# Patient Record
Sex: FEMALE | Race: WHITE | NOT HISPANIC OR LATINO | Employment: FULL TIME | URBAN - METROPOLITAN AREA
[De-identification: names, ages, dates, MRNs, and addresses within clinical notes are randomized per-mention and may not be internally consistent; named-entity substitution may affect disease eponyms.]

---

## 2017-02-01 ENCOUNTER — ALLSCRIPTS OFFICE VISIT (OUTPATIENT)
Dept: OTHER | Facility: OTHER | Age: 37
End: 2017-02-01

## 2017-02-01 ENCOUNTER — TRANSCRIBE ORDERS (OUTPATIENT)
Dept: ADMINISTRATIVE | Facility: HOSPITAL | Age: 37
End: 2017-02-01

## 2017-02-01 DIAGNOSIS — N64.52 NIPPLE DISCHARGE: Primary | ICD-10-CM

## 2017-04-20 ENCOUNTER — HOSPITAL ENCOUNTER (OUTPATIENT)
Dept: ULTRASOUND IMAGING | Facility: CLINIC | Age: 37
Discharge: HOME | End: 2017-04-20
Payer: COMMERCIAL

## 2017-04-20 ENCOUNTER — HOSPITAL ENCOUNTER (OUTPATIENT)
Dept: MAMMOGRAPHY | Facility: CLINIC | Age: 37
Discharge: HOME | End: 2017-04-20
Payer: COMMERCIAL

## 2017-04-20 DIAGNOSIS — N64.52 NIPPLE DISCHARGE: ICD-10-CM

## 2017-04-20 PROCEDURE — G0204 DX MAMMO INCL CAD BI: HCPCS

## 2017-04-20 PROCEDURE — 76642 ULTRASOUND BREAST LIMITED: CPT

## 2017-05-01 ENCOUNTER — ALLSCRIPTS OFFICE VISIT (OUTPATIENT)
Dept: OTHER | Facility: OTHER | Age: 37
End: 2017-05-01

## 2017-05-08 ENCOUNTER — ALLSCRIPTS OFFICE VISIT (OUTPATIENT)
Dept: OTHER | Facility: OTHER | Age: 37
End: 2017-05-08

## 2017-05-08 DIAGNOSIS — F41.9 ANXIETY DISORDER: ICD-10-CM

## 2017-05-08 DIAGNOSIS — H93.A2 PULSATILE TINNITUS OF LEFT EAR: ICD-10-CM

## 2017-05-08 DIAGNOSIS — H93.A1 PULSATILE TINNITUS OF RIGHT EAR: ICD-10-CM

## 2017-05-08 DIAGNOSIS — M87.00: ICD-10-CM

## 2017-05-08 DIAGNOSIS — M35.9 SYSTEMIC INVOLVEMENT OF CONNECTIVE TISSUE (HCC): ICD-10-CM

## 2017-05-08 DIAGNOSIS — G47.00 INSOMNIA: ICD-10-CM

## 2017-05-08 DIAGNOSIS — E04.1 NONTOXIC SINGLE THYROID NODULE: ICD-10-CM

## 2017-05-09 ENCOUNTER — TRANSCRIBE ORDERS (OUTPATIENT)
Dept: ADMINISTRATIVE | Facility: HOSPITAL | Age: 37
End: 2017-05-09

## 2017-05-09 DIAGNOSIS — H93.A2 PULSATILE TINNITUS OF LEFT EAR: ICD-10-CM

## 2017-05-09 DIAGNOSIS — H93.A1 PULSATILE TINNITUS OF RIGHT EAR: Primary | ICD-10-CM

## 2017-05-15 ENCOUNTER — HOSPITAL ENCOUNTER (OUTPATIENT)
Dept: ULTRASOUND IMAGING | Facility: HOSPITAL | Age: 37
Discharge: HOME | End: 2017-05-15
Payer: COMMERCIAL

## 2017-05-15 DIAGNOSIS — M87.00: ICD-10-CM

## 2017-05-15 DIAGNOSIS — E04.1 NONTOXIC SINGLE THYROID NODULE: ICD-10-CM

## 2017-05-15 DIAGNOSIS — F41.9 ANXIETY DISORDER: ICD-10-CM

## 2017-05-15 DIAGNOSIS — G47.00 INSOMNIA: ICD-10-CM

## 2017-05-15 DIAGNOSIS — H93.A2 PULSATILE TINNITUS OF LEFT EAR: ICD-10-CM

## 2017-05-15 DIAGNOSIS — H93.A1 PULSATILE TINNITUS OF RIGHT EAR: ICD-10-CM

## 2017-05-15 DIAGNOSIS — M35.9 SYSTEMIC INVOLVEMENT OF CONNECTIVE TISSUE (HCC): ICD-10-CM

## 2017-05-15 PROCEDURE — 76536 US EXAM OF HEAD AND NECK: CPT

## 2017-05-15 PROCEDURE — 93880 EXTRACRANIAL BILAT STUDY: CPT

## 2017-05-18 ENCOUNTER — HOSPITAL ENCOUNTER (OUTPATIENT)
Dept: MRI IMAGING | Facility: HOSPITAL | Age: 37
Discharge: HOME | End: 2017-05-18
Payer: COMMERCIAL

## 2017-05-18 DIAGNOSIS — H93.A1 PULSATILE TINNITUS OF RIGHT EAR: ICD-10-CM

## 2017-05-18 DIAGNOSIS — H93.A2 PULSATILE TINNITUS OF LEFT EAR: ICD-10-CM

## 2017-05-18 PROCEDURE — 70551 MRI BRAIN STEM W/O DYE: CPT

## 2017-06-05 ENCOUNTER — ALLSCRIPTS OFFICE VISIT (OUTPATIENT)
Dept: OTHER | Facility: OTHER | Age: 37
End: 2017-06-05

## 2017-08-02 ENCOUNTER — ALLSCRIPTS OFFICE VISIT (OUTPATIENT)
Dept: OTHER | Facility: OTHER | Age: 37
End: 2017-08-02

## 2017-10-03 ENCOUNTER — ALLSCRIPTS OFFICE VISIT (OUTPATIENT)
Dept: OTHER | Facility: OTHER | Age: 37
End: 2017-10-03

## 2017-10-04 NOTE — PROGRESS NOTES
Assessment  1  Breast abscess (611 0) (N61 1)    Plan  Breast abscess    · Cephalexin 500 MG Oral Capsule; TAKE 1 CAPSULE 3 TIMES DAILY   · Follow Up if Not Better Evaluation and Treatment  Follow-up  Status: Complete  Done:  [id number] 01:28PM    Chief Complaint  patient presents today stating her R breast hurts since Thursday  Patient states she noticed lump under her skin tissue that opened up friday and oozed pus  Patient states she applied topical antibiotic ointment and has a low fever Thursday and Friday  History of Present Illness  HPI: Patient here with right breast pain and possible abscess which open over the weekend  Patient had some redness and then the abscess opened and drained  Patient now some scabbing and still some redness  No discharge at this point  No fever at this point  Patient did see Dr Rosario Montgomery on multiple occasions and had to her mammograms and 2 breast ultrasounds which were negative  Review of Systems    Constitutional: No fever, no chills, feels well, no tiredness, no recent weight gain or loss  ENT: no ear ache, no loss of hearing, no nosebleeds or nasal discharge, no sore throat or hoarseness  Cardiovascular: no complaints of slow or fast heart rate, no chest pain, no palpitations, no leg claudication or lower extremity edema  Respiratory: no complaints of shortness of breath, no wheezing, no dyspnea on exertion, no orthopnea or PND  Breasts: no complaints of breast pain, breast lump or nipple discharge,-as noted in HPI,-no breast pain,-no breast swelling,-no reddening of the breast,-no breast lump,-no breast itching-and-no nipple discharge  Gastrointestinal: no complaints of abdominal pain, no constipation, no nausea or diarrhea, no vomiting, no bloody stools  Genitourinary: no complaints of dysuria, no incontinence, no pelvic pain, no dysmenorrhea, no vaginal discharge or abnormal vaginal bleeding     Musculoskeletal: no complaints of arthralgia, no myalgia, no joint swelling or stiffness, no limb pain or swelling  Integumentary: as noted in HPI  Neurological: as noted in HPI  Active Problems  1  Anxiety disorder (300 00) (F41 9)   2  Autoimmune disease (279 49) (M35 9)   3  Avascular bone necrosis (733 40) (M87 00)   4  Crohn's disease (555 9) (K50 90)   5  Endometriosis (617 9) (N80 9)   6  Insomnia (780 52) (G47 00)   7  Left hip pain (719 45) (M25 552)   8  Low back pain (724 2) (M54 5)   9  Malignant melanoma of back (172 5) (C43 59)   10  Myalgia And Myositis (729 1)   11  Need for immunization against influenza (V04 81) (Z23)   12  Nipple discharge (611 79) (N64 52)   13  Nontoxic single thyroid nodule (241 0) (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)   14  Pulsatile tinnitus of left ear (388 30) (H93 A2)   15  Pulsatile tinnitus of right ear (388 30) (H93 A1)   16  Symptomatic varicose veins of left lower extremity (454 8) (I83 892)   17  URTI (acute upper respiratory infection) (465 9) (J06 9)   18  Varicose veins (454 9) (I83 90)    Past Medical History  1  History of Arthritis (V13 4)   2  History of Avascular Necrosis (733 40)   3  History of Black tarry stools (578 1) (K92 1)   4  History of Breast pain, right (611 71) (N64 4)   5  History of Endometriosis (617 9) (N80 9)   6  History of abdominal pain (V13 89) (Z87 898)   7  History of anemia (V12 3) (Z86 2)   8  History of Crohn's disease (V12 70) (Z87 19)   9  History of diarrhea (V12 79) (Z87 898)   10  History of dizziness (V13 89) (Z87 898)   11  History of gastroesophageal reflux (GERD) (V12 79) (Z87 19)   12  History of gestational diabetes mellitus (GDM) (V12 21) (Z86 32)   13  History of malignant melanoma (V10 82) (Z85 820)   14  History of nausea (V12 79) (Z87 898)   15  History of pregnancy (V13 29)   16  History of thyroid disease (V12 29) (Z86 39)   17  History of vomiting (V13 89) (Z87 898)   18  History of Menarche (V21 8)   19  History of Orbital myositis, 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) (H05 129)   20   History of Uses birth control (V25 9) (Z30 9)  Active Problems And Past Medical History Reviewed: The active problems and past medical history were reviewed and updated today  Family History  Mother    1  Family history of Diabetes Mellitus (V18 0)   2  Denied: Family history of malignant neoplasm  Father    3  Denied: Family history of malignant neoplasm  Family History    4  Family history of Anxiety (Symptom)   5  Family history of Coronary Artery Disease (V17 49)   6  Family history of Depression   7  Family history of Stroke Syndrome (V17 1)    Social History   · Denied: History of Being A Social Drinker   · Denied: History of Drug Use   · Never A Smoker    Surgical History  1  History of  Section   2  History of Dental Surgery   3  History of Dilation And Curettage   4  History of Excision Of Lesion Trunk   5  History of Hernia Repair   6  History of Knee Surgery   7  History of Laparoscopy (Diagnostic)    Current Meds   1  Calcium TABS; Therapy: (Recorded:53Nuh5801) to Recorded   2  ClonazePAM 1 MG Oral Tablet; TAKE 1 TABLET AT BEDTIME; Therapy: 69NDO7528 to (Evaluate:2017); Last KS:[id number] Ordered   3  Multivitamins TABS; TAKE 1 TABLET DAILY; Therapy: 46AIY1919 to Recorded   4  Tri-Sprintec 0 18/0 215/0 25 MG-35 MCG Oral Tablet; Therapy: 54BSH3827 to Recorded   5  Vitamin C CAPS; Therapy: (Recorded:66Ttu9340) to Recorded   6  Vitamin D3 5000 UNIT Oral Tablet; TAKE 1 TABLET EVERY OTHER DAY; Therapy: 91QUC9129 to Recorded    The medication list was reviewed and updated today  Allergies  1  No Known Drug Allergies  2  Adhesive Tape   3  Other    Vitals   Recorded: 01ZYK8822 01:13PM   Temperature 98 7 F, Tympanic   Systolic 546, LUE, Sitting   Diastolic 78, LUE, Sitting   Height 5 ft 6 in   Weight 167 lb    BMI Calculated 26 95   BSA Calculated 1 85     Physical Exam    Constitutional   General appearance: No acute distress, well appearing and well nourished      Eyes   Conjunctiva and lids: No swelling, erythema or discharge  Pupils and irises: Equal, round and reactive to light  Ears, Nose, Mouth, and Throat   External inspection of ears and nose: Normal     Otoscopic examination: Tympanic membranes translucent with normal light reflex  Canals patent without erythema  Nasal mucosa, septum, and turbinates: Normal without edema or erythema  Oropharynx: Normal with no erythema, edema, exudate or lesions  Pulmonary   Respiratory effort: No increased work of breathing or signs of respiratory distress  Auscultation of lungs: Clear to auscultation  Cardiovascular   Palpation of heart: Normal PMI, no thrills  Auscultation of heart: Normal rate and rhythm, normal S1 and S2, without murmurs  Examination of extremities for edema and/or varicosities: Normal     Carotid pulses: Normal     Abdomen   Abdomen: Non-tender, no masses  Liver and spleen: No hepatomegaly or splenomegaly  Lymphatic   Palpation of lymph nodes in neck: Abnormal  -Thyroid nodules bilaterally  Musculoskeletal   Gait and station: Normal     Digits and nails: Normal without clubbing or cyanosis  Inspection/palpation of joints, bones, and muscles: Normal     Skin   Skin and subcutaneous tissue: Normal without rashes or lesions  Neurologic   Cranial nerves: Cranial nerves 2-12 intact  Reflexes: 2+ and symmetric  Sensation: No sensory loss  Psychiatric   Orientation to person, place, and time: Normal     Mood and affect: Normal          Results/Data  PHQ-2 Adult Depression Screening [id number] 01:18PM UserDaniel     Test Name Result Flag Reference   PHQ-2 Adult Depression Score 0     Over the last two weeks, how often have you been bothered by any of the following problems?   Little interest or pleasure in doing things: Not at all - 0  Feeling down, depressed, or hopeless: Not at all - 0   PHQ-2 Adult Depression Screening Negative         Future Appointments    Date/Time Provider Specialty Site 11/08/2017 08:30 AM Santosh Fox DO [facility]  [place]     Signatures   Electronically signed by : Swapna Mena DO; Oct  3 2017  1:28PM EST                       (Author)

## 2017-11-02 ENCOUNTER — HOSPITAL ENCOUNTER (OUTPATIENT)
Dept: NON INVASIVE DIAGNOSTICS | Facility: CLINIC | Age: 37
Discharge: HOME | End: 2017-11-02
Payer: COMMERCIAL

## 2017-11-02 DIAGNOSIS — I83.892 VARICOSE VEINS OF LEFT LOWER EXTREMITY WITH OTHER COMPLICATIONS (CODE): ICD-10-CM

## 2017-11-02 DIAGNOSIS — I83.892 VARICOSE VEINS OF LEFT LOWER EXTREMITIES WITH OTHER COMPLICATIONS: ICD-10-CM

## 2017-11-02 DIAGNOSIS — I83.90 ASYMPTOMATIC VARICOSE VEINS OF LOWER EXTREMITY: ICD-10-CM

## 2017-11-02 DIAGNOSIS — I83.892 VARICOSE VEINS OF LEFT LOWER EXTREMITY WITH OTHER COMPLICATIONS: ICD-10-CM

## 2017-11-02 PROCEDURE — 93970 EXTREMITY STUDY: CPT

## 2017-11-08 ENCOUNTER — ALLSCRIPTS OFFICE VISIT (OUTPATIENT)
Dept: OTHER | Facility: OTHER | Age: 37
End: 2017-11-08

## 2017-11-10 NOTE — PROGRESS NOTES
Assessment    1  Autoimmune disease (279 49) (M35 9)  2  Symptomatic varicose veins of left lower extremity (454 8) (I83 892)  3  Varicose veins (454 9) (I83 90)  4  Avascular bone necrosis (733 40) (M87 00)  5  Crohn's disease (555 9) (K50 90)    Plan  Symptomatic varicose veins of left lower extremity, Varicose veins    · (1) BASIC METABOLIC PROFILE; Status:Active; Requested OMQ:[id number];   Perform:[facility]; XBV:[id number]; Ordered; For:Symptomatic varicose veins of left lower extremity, Varicose veins; Ordered By:Batsheva Cochran;   · (1) CBC/PLT/DIFF; Status:Active; Requested YBL:[id number];   Perform:[facility]; XBB:[id number]; Ordered; For:Symptomatic varicose veins of left lower extremity, Varicose veins; Ordered By:Batsheva Cochran;   · (1) PT WITH INR; Status:Active; Requested BUN:[id number];   Perform:[facility]; XIK:[id number]; Ordered; For:Symptomatic varicose veins of left lower extremity, Varicose veins; Ordered By:Batsheva Cochran;   · (1) TYPE & SCREEN; Status:Active; Requested for:08Nov2017;   Perform:[facility]; Order Comments:preop EVLT LLE; GTQ:[id number]; Ordered; For:Symptomatic varicose veins of left lower extremity, Varicose veins; Ordered By:Batsheva Cochran;  Pt currently receiving a biologic? : NoIs the patient pregnant or have they been in the last 90 days? : NoHas the patient been transfused in the last 3 months? : NoMedical or PreOp : PreOp   · Follow-up After Procedure Evaluation and Treatment  Follow-up  Status: Hold For -Scheduling  Requested for: [id number]  Ordered; For: Symptomatic varicose veins of left lower extremity, Varicose veins;  Ordered By: Leopoldo Truong  Performed:   Due: 53TNE0886; Last Updated By: Tarah Ferrell; 11/8/2017 9:24:41 AM   · Schedule Surgery Treatment  Procedure  Status: Hold For - Scheduling  Requested BXG:[id number]  Ordered; For: Symptomatic varicose veins of left lower extremity, Varicose veins;  Ordered By: Thong Paez  Performed:   Due: 03PZJ8715; Last Updated By: Tonio Guzmán; 11/8/2017 9:24:16 AM    Discussion/Summary  Discussion Summary:   Berenice Richards Is a 42-year-old woman with bilateral lower extremity symptomatically varicose Veins Despite trial of compression stockings  reflux study Confirmed bilateral greater saphenous vein incompetence  She reports her left leg is worse than her right leg  We'll plan on proceeding with left greater saphenous vein EVLT and stab phlebectomies  patient evaluated with Dr Flakito Gonzalez  Operative consent obtained by Dr Flakito Gonzalez  Counseling Documentation With Imm: The patient was counseled regarding diagnostic results,-- instructions for management,-- risk factor reductions,-- prognosis,-- patient and family education,-- impressions,-- risks and benefits of treatment options,-- importance of compliance with treatment  total time of encounter was 30 minutes-- and-- 25 minutes was spent counseling  Chief Complaint  Chief Complaint Free Text Note Form:  I am here to review my test results  Bibtaya  is here to review her LEVDR she had on 11/02/2017  History of Present Illness  HPI: 58-year-old female with a history of Crohn's disease, melanoma of the back, avascular necrosis, autoimmune disorder and symptomatic left lower extremity varicose veins return to the office today after 3 month trial conservative measures  LEVDR on 11/2/2017 demonstrates evidence of bilateral greater saphenous vein insufficiency of the proximal and mid thigh  No evidence of deep venous insufficiency or short saphenous vein insufficiency  Patient continues to complain of heaviness and chronic pruritus left lower extremity despite use of compression stockings and BLE elevation   She continues to deny DVT, PE, hypercoagulable disorder, venous ulcerations, recurrent cellulitis, venous stasis dermatitis or bleeding varicosities      Review of Systems  Complete Female - Vasc:  Constitutional: No fever or chills, feels well, no tiredness, no recent weight gain or weight loss  Eyes: No sudden vision loss, no blurred vision, no double vision  ENT: no loss of hearing, no nosebleeds, no hoarseness  Cardiovascular: no leg pain with walking-- and-- no bleeding veins, but-- regular heart rate,-- no chest pain,-- no intermittent leg claudication,-- painful veins-- and-- no palpitations  Respiratory: no shortness of breath,-- no wheezing,-- no cough,-- no shortness of breath during exertion,-- no orthopnea-- and-- no complaints of PND  Gastrointestinal: No nausea, No vomiting, no diarrhea, no blood in stool  Genitourinary: no dysuria, no Hematuria,no urinary incontinence  Musculoskeletal: no lumbar pain,-- limb pain-- and-- limb swelling, but-- no joint swelling,-- no myalgias-- and-- no joint stiffness  Integumentary: itching-- and-- no ulcers, but-- no rashes,-- no dry skin,-- no skin lesions-- and-- no skin wound  Neurological: no dementia, no headache, no numbness, no limb weakness, no dizziness, no difficulty walking  Psychiatric: no depression, no mood disorders, no anxiety  Hematologic/Lymphatic: no bleeding disorder, no easy bruising  ROS Reviewed:   ROS reviewed  Active Problems  1  Anxiety disorder (300 00) (F41 9)  2  Autoimmune disease (279 49) (M35 9)  3  Avascular bone necrosis (733 40) (M87 00)  4  Breast abscess (611 0) (N61 1)  5  Crohn's disease (555 9) (K50 90)  6  Endometriosis (617 9) (N80 9)  7  Insomnia (780 52) (G47 00)  8  Left hip pain (719 45) (M25 552)  9  Low back pain (724 2) (M54 5)  10  Malignant melanoma of back (172 5) (C43 59)  11  Myalgia And Myositis (729 1)  12  Need for immunization against influenza (V04 81) (Z23)  13  Nipple discharge (611 79) (N64 52)  14  Nontoxic single thyroid nodule (241 0) (E04 1)  15  Pulsatile tinnitus of left ear (388 30) (H93 A2)  16  Pulsatile tinnitus of right ear (388 30) (H93 A1)  17   Symptomatic varicose veins of left lower extremity (454 8) (I83 892)  18  URTI (acute upper respiratory infection) (465 9) (J06 9)  19  Varicose veins (454 9) (I83 90)    Past Medical History  1  History of Arthritis (V13 4)  2  History of Avascular Necrosis (733 40)  3  History of Black tarry stools (578 1) (K92 1)  4  History of Breast pain, right (611 71) (N64 4)  5  History of Endometriosis (617 9) (N80 9)  6  History of abdominal pain (V13 89) (Z87 898)  7  History of anemia (V12 3) (Z86 2)  8  History of Crohn's disease (V12 70) (Z87 19)  9  History of diarrhea (V12 79) (Z87 898)  10  History of dizziness (V13 89) (Z87 898)  11  History of gastroesophageal reflux (GERD) (V12 79) (Z87 19)  12  History of gestational diabetes mellitus (GDM) (V12 21) (Z86 32)  13  History of malignant melanoma (V10 82) (Z85 820)  14  History of nausea (V12 79) (Z87 898)  15  History of pregnancy (V13 29)  16  History of thyroid disease (V12 29) (Z86 39)  17  History of vomiting (V13 89) (Z87 898)  18  History of Menarche (V21 8)  19  History of Orbital myositis, 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) (H05 129)  20  History of Uses birth control (V25 9) (Z30 9)  Active Problems And Past Medical History Reviewed: The active problems and past medical history were reviewed and updated today  Surgical History    1  History of  Section  2  History of Dental Surgery  3  History of Dilation And Curettage  4  History of Excision Of Lesion Trunk  5  History of Hernia Repair  6  History of Knee Surgery  7  History of Laparoscopy (Diagnostic)  Surgical History Reviewed: The surgical history was reviewed and updated today  Family History  Mother   1  Family history of Diabetes Mellitus (V18 0)  2  Denied: Family history of malignant neoplasm  Father   3  Denied: Family history of malignant neoplasm  Family History   4  Family history of Anxiety (Symptom)  5  Family history of Coronary Artery Disease (V17 49)  6  Family history of Depression  7   Family history of Stroke Syndrome (V17 1)  Family History Reviewed: The family history was reviewed and updated today  Social History     · Denied: History of Being A Social Drinker   · Denied: History of Drug Use   · Never A Smoker  Social History Reviewed: The social history was reviewed and is unchanged  Current Meds  1  Calcium TABS; Therapy: (Recorded:04May2016) to Recorded  2  Cephalexin 500 MG Oral Capsule; TAKE 1 CAPSULE 3 TIMES DAILY; Therapy: 39NCK9169 to (Evaluate:13Oct2017); Last Rx:03Oct2017 Ordered  3  ClonazePAM 1 MG Oral Tablet; TAKE 1 TABLET AT BEDTIME; Therapy: 49ILY4631 to (Evaluate:04Nov2017); Last AQ:[id number] Ordered  4  Multivitamins TABS; TAKE 1 TABLET DAILY; Therapy: 55CED2866 to Recorded  5  Tri-Sprintec 0 18/0 215/0 25 MG-35 MCG Oral Tablet; Therapy: 42TGD3620 to Recorded  6  Vitamin C CAPS; Therapy: (Recorded:04May2016) to Recorded  7  Vitamin D3 5000 UNIT Oral Tablet; TAKE 1 TABLET EVERY OTHER DAY; Therapy: 33ZZL4164 to Recorded  Medication List Reviewed: The medication list was reviewed and updated today  Allergies  1  No Known Drug Allergies  2  Adhesive Tape  3  Other    Vitals  Vital Signs    Recorded: 72VUX8210 09:06AM   Temperature 97 6 F   Heart Rate 80   Respiration Quality Normal   Respiration 18   Systolic 264, LUE, Sitting   Diastolic 76, LUE, Sitting   Height 5 ft 6 in       Physical Exam   Carotid: right 2+,-- no bruit heard on the right,-- left 2+-- and-- no bruit on the left  Radial: right 2+-- and-- left 2+  Femoral: right 2+-- and-- left 2+  Posterior tibialis: right 2+-- and-- left 2+  Dorsalis pedis: right 2+-- and-- left 2+  Distal Pulse Exam: Normal Capillary Refill  LE Varicose Veins: no right leg truncal veins,-- no left leg truncal veins,-- right leg reticular veins,-- left leg reticular veins,-- right leg spider veins-- and-- left leg spider veins  Reticular varicosities left anterior shin left lateral upper thigh   No evidence of bleeding varicosities, venous ulcerations, stasis dermatitis or hemosiderin staining The heart rate was normal  The rhythm was regular  Heart sounds: normal S1,-- normal S2,-- no gallop heard,-- no S3-- and-- no S4  No pericardial rub  Murmurs: No murmurs were heard  Pulmonary  Respiratory effort: No increased work of breathing or signs of respiratory distress  Auscultation of lungs: Clear to auscultation  No wheezing, no rales, no rhonchi  Abdomen  Abdomen: Abdomen soft, non-tender, no masses, non distended, no rebound tenderness  -- normoactive bowel sounds  No palpable aneurysm  -- No bruit heard  Psychiatric  Orientation to person, place and time: Normal   Mood and affect: Normal   Eyes  Pupils and irises: Equal, round and reactive to light  Ears, Nose, Mouth, and Throat  Hearing: Normal   Neck  Neck: No jugular venous distention, normal ROM and extension  No cervical adenopathy, trachea midline, neck supple  Neurologic Sensory exam normal  Motor skills intact  Musculoskeletal  Gait and station: Normal   Digits and nails: Normal without clubbing or cyanosis  Range of motion: Normal   Muscle strength/tone: Normal   Skin  Skin and subcutaneous tissue:-- please refer to extremity exam above  Palpation of skin and subcutaneous tissue: Normal turgor  Results/Data  Diagnostic Studies Reviewed Vasc: I personally reviewed the films/images/results in the office today  My interpretation follows  VAS REFLUX LOWER LIMB    [id number] 08:03AM [facility] Order Number: [id number]   - Patient Instructions: To schedule this appointment, please contact Central Scheduling at [phone]  Test Name Result Flag Reference   VAS LOWER LIMB VENOUS DUPLEX STUDY WITH REFLUX ASSESSMENT, COMPLETE BILATERAL (Report)       [facility] REPORT  CLINICAL:  Indications:  Patient presents with history of bilateral lower extremity varicose veins    Patient reports pain/heaviness and itchiness of the lower extremities, left  greater than right, with edema at the end of the day       Clinical:  Left Lower Limb  There is edema, tenderness and varicose veins  Right Lower Limb  There is edema, tenderness and varicose veins  FINDINGS:    Right      Impression    AP(mm) Valve  Reflux Time   GSV Inguinal            5 3              GSV Prox Thigh           4 6 Reflux   4 83020   GSV Mid Thigh            3 7 Reflux   3 60976   GSV Dist Thigh           3 6              CFV       Normal (Patent)                  GSV Knee              3 6              GSV Mid Calf            2 8              GSV Ankle              2 3              SSV Mid Calf            1 6              SSV Knee              1 8              SSV Ankle              2 4                Left      Impression    AP(mm) Valve  Reflux Time   GSV Inguinal            6 4              GSV Prox Thigh           7 1              GSV Mid Thigh            4 8 Reflux   1 82318   GSV Dist Thigh           4 6              CFV       Normal (Patent)                  GSV Knee              4 2              GSV Mid Calf            2 2              GSV Ankle              2 5              SSV Mid Calf            1 8              SSV Knee              2 0              SSV Ankle              2 8                    CONCLUSION:  Impression:  RIGHT LIMB: ABNORMAL  No evidence of deep venous incompetence  The great saphenous vein is incompetent at the proximal and mid thigh levels  The great saphenous vein remains within the saphenous compartment in the thigh  The small saphenous vein is competent and does not communicate with the  popliteal vein  There is no evidence of incompetent perforators in the thigh or calf  There is no evidence of deep vein thrombosis in the common femoral vein, the  proximal deep femoral vein, the femoral vein and the popliteal vein  LEFT LIMB: ABNORMAL  No evidence of deep venous incompetence    The great saphenous vein is incompetent at the mid thigh level  The great saphenous vein remains within the saphenous compartment in the thigh  The small saphenous vein is competent and does not communicate with the  popliteal vein  There is no evidence of incompetent perforators in the thigh or calf  There is no evidence of deep vein thrombosis in the common femoral vein, the  proximal deep femoral vein, the femoral vein and the popliteal vein  Study performed with patient in standing and steep Reverse Trendelenburg  SIGNATURE:  Electronically Signed by: Octavia Galvan on 2017-11-05 09:05:52 PM           Surgery Scheduling Form  Vascular Surgery Scheduling Form [facility] Standard:     Location:   Confirmation Number:   Procedure Date:   Requested Time:  Physician Dr Yomaira Ferrari  Co-Surgeon: No IR Physician Needed  [facility] Required: No PA-C Needed  Bed: Outpatient- No Bed Required  Anesthesia: General        PROCEDURE DETAILS  Procedure: left EVLT with stab phlebectomy  Laterality: Left  Anticipated frozen section: NO    Procedure Codes:   Pre-op diagnosis:   Diagnosis Code(s):   Case Length:  Equipment: [employer] Table not needed,-- Cell Saver not needed-- and-- Discovery Table not needed, but-- Standard OR Table Requested  Equipment Needs: Vascular Technologist    Implants/Representative:     REGISTRATION & FINANCIAL CLEARANCE     Amount Paid/Date:   FA Initials:   Insurance:   Policy Number: Group Number:     PRE-ADMISSION TESTING & CLINICAL INFORMATION   PAT Location:     Consults Needed  Anesthesia Consult: No Anesthesia consult needed  Medical Consult: No Medical consult needed  Cardiac Consult: No Cardiac consult needed  Other Consult: No Other consult needed        ALLERGIES AND ALERTS  Latex Allergy: NO  Penicillin Allergy: NO  Malignant Hyperthermia: NO  Diabetic Patient: NO  Height: 66   Weight: 167 lbs       COMMENTS   Scheduling Information Provided By:     IN OFFICE USE  Urgency: Standard (nonurgent) Additional Bed Requirements: SLB/P4-P5 Bed not needed-- and-- Med/Surg Telemetry Bed not needed  CD to Hospital No  Is the patient able to walk up a flight of stairs, walk up a hill or do heavy housework WITHOUT having chest pain or shortness of breath? YES  Patient does not takes Coumadin   Patient does not take Xarelto   Patient does not take Pradaxa   Patient does not take Aspirin   Patient does not take Plavix   Patient does not take Arixtra   Patient does not take Heparin/Lovenox   Patient does not take Aggrenox   Patient does not take Eliquis   Browel Prep not needed   Cardiac Clearance not needed   Type & Screen needed  Type & Screen and Prepare RBC not needed     Standard Diagnostic Testing is needed       Signatures   Electronically signed by : Cassie Wallace, Kelton Quiroz; Nov 8 2017 12:49PM EST                       (Author)    Electronically signed by : Kirt Carrion DO; Nov 8 2017  1:05PM EST                       (Author)    Electronically signed by : Kirt Carrion DO; Nov 8 2017  4:09PM EST                       (Author)

## 2018-01-03 ENCOUNTER — TRANSCRIBE ORDERS (OUTPATIENT)
Dept: ADMINISTRATIVE | Facility: HOSPITAL | Age: 38
End: 2018-01-03

## 2018-01-03 DIAGNOSIS — N93.8 DYSFUNCTIONAL UTERINE BLEEDING: Primary | ICD-10-CM

## 2018-01-12 VITALS
DIASTOLIC BLOOD PRESSURE: 76 MMHG | RESPIRATION RATE: 18 BRPM | SYSTOLIC BLOOD PRESSURE: 128 MMHG | HEIGHT: 66 IN | TEMPERATURE: 97.6 F | HEART RATE: 80 BPM

## 2018-01-12 VITALS
TEMPERATURE: 97.1 F | BODY MASS INDEX: 26.68 KG/M2 | DIASTOLIC BLOOD PRESSURE: 70 MMHG | SYSTOLIC BLOOD PRESSURE: 114 MMHG | OXYGEN SATURATION: 98 % | HEART RATE: 94 BPM | HEIGHT: 66 IN | WEIGHT: 166 LBS | RESPIRATION RATE: 13 BRPM

## 2018-01-12 NOTE — PROGRESS NOTES
Assessment    1  Symptomatic varicose veins of left lower extremity (454 8) (R46 342)   2  Crohn's disease (555 9) (K50 90)   3  Autoimmune disease (279 49) (M35 9)    Plan    1  Apply warm moist compresses to the affected area 3 times a day for 5 minutes ;   Status:Complete;   Done: 17BMO4725   2  Keep your leg elevated whenever you can to decrease swelling and increase circulation ;   Status:Complete;   Done: 70SIX8808   3  Restrict the salt in your diet by avoiding highly salted foods ; Status:Complete;   Done:   32VIL9833   4  Gradient compression stocking, below knee, 20-30mm Hg, each; Status:Complete;     Done: 35PQD5896   5  VAS REFLUX LOWER LIMB   ; Status:Active; Requested RXT:30WMT1167;    6  Follow-up visit in 3 months Evaluation and Treatment  Follow-up  Status: Complete    Done: 36RIM0201    Discussion/Summary  Discussion Summary:   69-year-old female with a history of Crohn's disease, endometriosis, melanoma, autoimmune disorder and symptomatic left lower extremity varicose veins  Recommend 3 month trial of conservative measures including compression stockings, lower extremity elevation, low-sodium diet, exercise and skin moisturization  Return to office in 3 months with LEVDR prior to appointment  Patient instructed to contact the office in the interim with any questions, concerns or change in symptoms  All questions answered and patient agrees with treatment plan  Counseling Documentation With Imm: The patient was counseled regarding instructions for management, risk factor reductions, patient and family education, impressions, risks and benefits of treatment options, importance of compliance with treatment  total time of encounter was 30 minutes and 25 minutes was spent counseling  Chief Complaint  Chief Complaint Free Text Note Form: " I am here for my veins "    Ms Stroud is here for her Varicose Veins and spider veins  Pt states they started when she was 23 after pregnancy   Pt c/o pain in her left leg as well as bulging veins  Pt c/o achiness/tiredness/heaviness in her left leg  Pt states she gets itchiness where the veins are she also c/o swelling and dermatitis  Pt does not wear compression stockings  History of Present Illness  Varicose Veins [facility]: The patient is being seen for an initial evaluation of varicose veins  Symptoms:  left bulging veins, left dilated veins, left leg swelling, dry skin on the left side, itching on the left side and left leg pain, but no discolored veins, no muscle cramps, no spider veins, no stasis dermatitis, no cellulitis, no hyperpigmentation, no venous ulcers and no bleeding  The patient describes the pain as aching, itching, burning, throbbing and heavy  These symptoms developed 14 year(s) ago  This patient has no history of DVT, pulmonary embolism, superficial venous thrombosis, or a hypercoagulable state  Evaluation and Treatment History: This patient has had no prior surgical treatment of the venous system  This patient is not currently utilizing any conservative management strategies to manage the current symptoms  The patient has never used compression hose  This patient is not exercising regularly  This patient is attempting weight management   Moisturizers are being used to manage dry skin  This patient is using periodic leg elevation   Efficacy of conservative treatment: The conservative measures have not helped the patient's symptoms  Imaging: Prior venous imaging with a lower extremity venous duplex to assess for reflux has not been performed  Free Text HPI: 50-year-old female with a history of Crohn's disease, melanoma of the back, avascular necrosis, autoimmune disorder, and endometriosis who presents for evaluation of symptomatic left lower extremity varicose veins  Patient states onset of her varicose veins at the age of 24 after pregnancy with triplets   She complains of heavy sensation of the entire left leg with dull, aching, sustained sensation as well as chronic pruritus of the left anterior shin  She denies history of DVT, PE, hypercoagulable state, bleeding varicosities, venous ulcerations, recurrent cellulitis or stasis dermatitis  She has a significant family history of varicose veins but no significant family history for PE, DVT or hypercoagulable state  Patient periodically elevates her legs with minimal relief and has not used compression stockings  Her symptoms have significantly been exacerbated for the past 2 years since she has changed to a sedentary job with prolonged sitting  Review of Systems  Complete Female - Vasc:   Constitutional: No fever or chills, feels well, no tiredness, no recent weight gain or weight loss  Eyes: No sudden vision loss, no blurred vision, no double vision  ENT: no loss of hearing, no nosebleeds, no hoarseness  Cardiovascular: no leg pain with walking and no bleeding veins, but regular heart rate, no chest pain, no intermittent leg claudication, painful veins and no palpitations  Respiratory: no shortness of breath, no wheezing, no cough, no shortness of breath during exertion, no orthopnea and no complaints of PND  Gastrointestinal: constipation and diarrhea, but no nausea, no vomiting and no blood in stools  no hemorrhoids   Genitourinary: no dysuria, no Hematuria,no urinary incontinence  Musculoskeletal: no lumbar pain, limb pain and limb swelling, but no joint swelling, no myalgias and no joint stiffness  Integumentary: itching and no ulcers, but no rashes, no skin lesions and no skin wound  Neurological: no dementia, no headache, no numbness, no limb weakness, no dizziness, no difficulty walking  Psychiatric: no depression, no mood disorders, no anxiety  Hematologic/Lymphatic: no bleeding disorder, no easy bruising  ROS Reviewed:   ROS reviewed  Active Problems    1  Anxiety disorder (300 00) (F41 9)   2   Autoimmune disease (279 49) (M35 9) 3  Avascular bone necrosis (733 40) (M87 00)   4  Crohn's disease (555 9) (K50 90)   5  Endometriosis (617 9) (N80 9)   6  Insomnia (780 52) (G47 00)   7  Left hip pain (719 45) (M25 552)   8  Low back pain (724 2) (M54 5)   9  Malignant melanoma of back (172 5) (C43 59)   10  Myalgia And Myositis (729 1)   11  Need for immunization against influenza (V04 81) (Z23)   12  Nipple discharge (611 79) (N64 52)   13  Nontoxic single thyroid nodule (241 0) (E04 1)   14  Pulsatile tinnitus of left ear (388 30) (H93 A2)   15  Pulsatile tinnitus of right ear (388 30) (H93 A1)   16  URTI (acute upper respiratory infection) (465 9) (J06 9)   17  Varicose veins (454 9) (I83 90)    Past Medical History    1  History of Arthritis (V13 4)   2  History of Avascular Necrosis (733 40)   3  History of Black tarry stools (578 1) (K92 1)   4  History of Breast pain, right (611 71) (N64 4)   5  History of Endometriosis (617 9) (N80 9)   6  History of abdominal pain (V13 89) (Z87 898)   7  History of anemia (V12 3) (Z86 2)   8  History of Crohn's disease (V12 70) (Z87 19)   9  History of diarrhea (V12 79) (Z87 898)   10  History of dizziness (V13 89) (Z87 898)   11  History of gastroesophageal reflux (GERD) (V12 79) (Z87 19)   12  History of gestational diabetes mellitus (GDM) (V12 21) (Z86 32)   13  History of malignant melanoma (V10 82) (Z85 820)   14  History of nausea (V12 79) (Z87 898)   15  History of pregnancy (V13 29)   16  History of thyroid disease (V12 29) (Z86 39)   17  History of vomiting (V13 89) (Z87 898)   18  History of Menarche (V21 8)   19  History of Orbital myositis, 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) (H05 129)   20  History of Uses birth control (V25 9) (Z30 9)  Active Problems And Past Medical History Reviewed: The active problems and past medical history were reviewed and updated today  Surgical History  Surgical History Reviewed: The surgical history was reviewed and updated today         Family History  Mother    1  Family history of Diabetes Mellitus (V18 0)   2  Denied: Family history of malignant neoplasm  Father    3  Denied: Family history of malignant neoplasm  Family History    4  Family history of Anxiety (Symptom)   5  Family history of Coronary Artery Disease (V17 49)   6  Family history of Depression   7  Family history of Stroke Syndrome (V17 1)  Family History Reviewed: The family history was reviewed and updated today  Social History    · Denied: History of Being A Social Drinker   · Denied: History of Drug Use   · Never A Smoker  Social History Reviewed: The social history was reviewed and updated today  Current Meds   1  Calcium TABS; Therapy: (Recorded:04May2016) to Recorded   2  ClonazePAM 1 MG Oral Tablet; TAKE 1 TABLET AT BEDTIME; Therapy: 87CMO3363 to (Evaluate:04Nov2017); Last [id number] Ordered   3  Multivitamins TABS; TAKE 1 TABLET DAILY; Therapy: 57OGQ5116 to Recorded   4  Tri-Sprintec 0 18/0 215/0 25 MG-35 MCG Oral Tablet; Therapy: 14BSN5523 to Recorded   5  Vitamin C CAPS; Therapy: (Recorded:04May2016) to Recorded   6  Vitamin D3 5000 UNIT Oral Tablet; TAKE 1 TABLET EVERY OTHER DAY; Therapy: 40SAB3560 to Recorded  Medication List Reviewed: The medication list was reviewed and updated today  Allergies    1  No Known Drug Allergies    2  Adhesive Tape   3  Other    Vitals  Vital Signs    Recorded: 02Aug2017 09:11AM   Heart Rate 88   Respiration Quality Normal   Respiration 18   Systolic 795, LUE, Sitting   Diastolic 80, LUE, Sitting   Height 5 ft 6 in     Results/Data  Diagnostic Studies Reviewed Vasc:   Vascular Studies Reviewed: No vascular studies for review  Physical Exam    Carotid: right 2+, no bruit heard on the right, left 2+ and no bruit on the left  Radial: right 2+ and left 2+  Femoral: right 2+ and left 2+  Popliteal: right 2+ and left 2+  Posterior tibialis: right 2+ and left 2+  Dorsalis pedis: right 2+ and left 2+  Distal Pulse Exam: Normal Capillary Refill  Extremities: No upper or lower extremity edema  LE Varicose Veins: no right leg truncal veins, no left leg truncal veins, no right leg reticular veins, left leg reticular veins, right leg spider veins and left leg spider veins  Reticular varicosity of the left anterior shin and left lateral upper thigh  No evidence of bleeding varicosities, venous ulcerations, stasis dermatitis, or hemosiderin staining The heart rate was normal  The rhythm was regular  Heart sounds: normal S1, normal S2, no gallop heard, no S3 and no S4  No pericardial rub  Murmurs: No murmurs were heard  Pulmonary   Respiratory effort: No increased work of breathing or signs of respiratory distress  Auscultation of lungs: Clear to auscultation  No wheezing, no rales, no rhonchi  Abdomen   Abdomen: Abdomen soft, non-tender, no masses, non distended, no rebound tenderness  Normoactive bowel sounds  No palpable aneurysm  No bruit heard  Psychiatric   Orientation to person, place and time: Normal    Mood and affect: Normal    Eyes   Pupils and irises: Equal, round and reactive to light  Ears, Nose, Mouth, and Throat   Hearing: Normal    Neck   Neck: No jugular venous distention, normal ROM and extension  No cervical adenopathy, trachea midline, neck supple  Thyroid: Normal, no thyromegaly  Neurologic Sensory exam normal   Motor skills intact  Musculoskeletal   Gait and station: Normal    Digits and nails: Normal without clubbing or cyanosis  Range of motion: Normal    Muscle strength/tone: Normal    Skin   Skin and subcutaneous tissue: Please refer to varicosity exam above  Palpation of skin and subcutaneous tissue: Normal turgor  Future Appointments    Date/Time Provider Specialty Site   11/08/2017 08:30 AM DiMaggio, Annetta Moritz,  [facility]  [place]     Signatures   Electronically signed by : Shandra Alvarez [facility];  Aug  2 2017 10:17AM EST (Author)    Electronically signed by :  Quincy Kumar MD; Aug  3 2017  2:53PM EST                       (Author)

## 2018-01-13 VITALS
TEMPERATURE: 98.7 F | DIASTOLIC BLOOD PRESSURE: 78 MMHG | BODY MASS INDEX: 26.84 KG/M2 | SYSTOLIC BLOOD PRESSURE: 120 MMHG | HEIGHT: 66 IN | WEIGHT: 167 LBS

## 2018-01-13 VITALS
WEIGHT: 167 LBS | TEMPERATURE: 98.3 F | SYSTOLIC BLOOD PRESSURE: 122 MMHG | HEIGHT: 66 IN | DIASTOLIC BLOOD PRESSURE: 78 MMHG | BODY MASS INDEX: 26.84 KG/M2

## 2018-01-13 VITALS
DIASTOLIC BLOOD PRESSURE: 80 MMHG | HEIGHT: 66 IN | HEART RATE: 88 BPM | SYSTOLIC BLOOD PRESSURE: 116 MMHG | RESPIRATION RATE: 18 BRPM

## 2018-01-14 VITALS
DIASTOLIC BLOOD PRESSURE: 70 MMHG | TEMPERATURE: 98.2 F | BODY MASS INDEX: 26.44 KG/M2 | WEIGHT: 164.5 LBS | SYSTOLIC BLOOD PRESSURE: 122 MMHG | HEIGHT: 66 IN

## 2018-01-15 VITALS
WEIGHT: 165 LBS | HEIGHT: 66 IN | DIASTOLIC BLOOD PRESSURE: 82 MMHG | TEMPERATURE: 98.1 F | HEART RATE: 96 BPM | RESPIRATION RATE: 15 BRPM | SYSTOLIC BLOOD PRESSURE: 118 MMHG | BODY MASS INDEX: 26.52 KG/M2 | OXYGEN SATURATION: 98 %

## 2018-01-16 NOTE — RESULT NOTES
Message   Call patient  Ultrasound of the thyroid stable and no change in nodules  Verified Results  US THYROID [id number] 05:30PM Chiquis Wright Order Number: [id number]     Test Name Result Flag Reference   US THYROID (Report)     THYROID ULTRASOUND     INDICATION: Nontoxic single thyroid nodule     COMPARISON: 4/28/2014     TECHNIQUE:  Ultrasound of the thyroid was performed with a high frequency linear transducer in transverse and sagittal planes including volumetric imaging sweeps as well as traditional still imaging technique  FINDINGS:   Thyroid parenchyma is diffusely heterogeneous in echotexture with focal nodule(s) as described below  Right gland: 5 0 x 1 1 x 1 8 cm  Upper pole 0 4 x 0 6 x 0 3 cm  Spongiform nodule  Smooth, well defined margins  No calcifications  Nodule is not taller than it is wide  Unchanged from prior      Mid pole 0 5 x 0 6 x 0 3 cm  Purely cystic nodule  Smooth, well defined margins  No calcifications  Nodule is not taller than it is wide  Given differences in measuring technique, no significant change from prior  Left gland: 5 1 x 1 4 x 2 1 cm  Upper pole 0 6 x 0 6 x 0 4 cm  Solid hypoechoic nodule  Smooth, well defined margins  No calcifications  Nodule is not taller than it is wide  Given differences in measuring technique, no significant change from prior  Upper pole 0 7 x 0 7 x 0 6 cm  Solid isoechoic nodule  Smooth, well defined margins  No calcifications  Nodule is not taller than it is wide  Unchanged from prior     Mid pole 0 5 x 0 6 x 0 5 cm  Spongiform nodule  Smooth, well defined margins  No calcifications  Nodule is not taller than it is wide  Given differences in measuring technique, no significant change from prior  Lower pole 0 6 x 0 6 x 0 3 cm  Spongiform nodule  Smooth, well defined margins  No calcifications  Nodule is not taller than it is wide   Given differences in measuring technique, no significant change from prior        Isthmus: 0 2 cm in AP dimension  No dominant nodules  IMPRESSION:      There are multiple thyroid nodules as above  These are stable and do not meet current American Thyroid Association criteria for requiring biopsy         Workstation performed: [id number]     Signed by:   Dara Bates MD   2/18/16

## 2018-01-19 ENCOUNTER — HOSPITAL ENCOUNTER (OUTPATIENT)
Dept: ULTRASOUND IMAGING | Facility: MEDICAL CENTER | Age: 38
Discharge: HOME | End: 2018-01-19
Payer: COMMERCIAL

## 2018-01-19 ENCOUNTER — GENERIC CONVERSION - ENCOUNTER (OUTPATIENT)
Dept: OTHER | Facility: OTHER | Age: 38
End: 2018-01-19

## 2018-01-19 DIAGNOSIS — N93.8 DYSFUNCTIONAL UTERINE BLEEDING: ICD-10-CM

## 2018-01-19 PROCEDURE — 76856 US EXAM PELVIC COMPLETE: CPT

## 2018-01-19 PROCEDURE — 76830 TRANSVAGINAL US NON-OB: CPT

## 2018-05-02 ENCOUNTER — TRANSCRIBE ORDERS (OUTPATIENT)
Dept: ADMINISTRATIVE | Facility: HOSPITAL | Age: 38
End: 2018-05-02

## 2018-05-02 DIAGNOSIS — N83.209 CYST OF OVARY, UNSPECIFIED LATERALITY: Primary | ICD-10-CM

## 2018-05-03 RX ORDER — CLONAZEPAM 1 MG/1
1 TABLET ORAL
COMMUNITY
Start: 2016-02-10 | End: 2018-05-08 | Stop reason: SDUPTHER

## 2018-05-03 RX ORDER — CALCIUM CARBONATE 500(1250)
TABLET ORAL
COMMUNITY
End: 2019-11-25

## 2018-05-03 RX ORDER — PNV NO.121/IRON/FOLIC ACID 28MG-0.8MG
1 TABLET ORAL DAILY
COMMUNITY
Start: 2013-01-28 | End: 2019-01-02

## 2018-05-03 RX ORDER — NORGESTIMATE AND ETHINYL ESTRADIOL 7DAYSX3 28
KIT ORAL
COMMUNITY
Start: 2014-07-03 | End: 2018-05-08

## 2018-05-03 RX ORDER — MULTIVIT-MIN/IRON/FOLIC ACID/K 18-600-40
CAPSULE ORAL DAILY
COMMUNITY

## 2018-05-08 ENCOUNTER — OFFICE VISIT (OUTPATIENT)
Dept: FAMILY MEDICINE CLINIC | Facility: CLINIC | Age: 38
End: 2018-05-08
Payer: COMMERCIAL

## 2018-05-08 VITALS
TEMPERATURE: 99.1 F | SYSTOLIC BLOOD PRESSURE: 110 MMHG | HEIGHT: 65 IN | DIASTOLIC BLOOD PRESSURE: 70 MMHG | WEIGHT: 157 LBS | BODY MASS INDEX: 26.16 KG/M2

## 2018-05-08 DIAGNOSIS — Z00.00 WELLNESS EXAMINATION: Primary | ICD-10-CM

## 2018-05-08 DIAGNOSIS — F51.01 PRIMARY INSOMNIA: Primary | ICD-10-CM

## 2018-05-08 PROBLEM — I83.90 VARICOSE VEINS: Status: ACTIVE | Noted: 2017-06-05

## 2018-05-08 PROBLEM — H93.A2 PULSATILE TINNITUS OF LEFT EAR: Status: ACTIVE | Noted: 2017-05-08

## 2018-05-08 PROBLEM — H93.A1 PULSATILE TINNITUS OF RIGHT EAR: Status: ACTIVE | Noted: 2017-05-08

## 2018-05-08 PROCEDURE — 99395 PREV VISIT EST AGE 18-39: CPT | Performed by: FAMILY MEDICINE

## 2018-05-08 RX ORDER — CLONAZEPAM 1 MG/1
1 TABLET ORAL
Qty: 90 TABLET | Refills: 0 | Status: SHIPPED | OUTPATIENT
Start: 2018-05-08 | End: 2018-09-04 | Stop reason: SDUPTHER

## 2018-05-08 NOTE — PROGRESS NOTES
Assessment/Plan:      Patient have laboratory studies regarding wellness exam   Patient otherwise doing well  Patient up-to-date with routine screenings  Follow-up yearly     Diagnoses and all orders for this visit:    Wellness examination  -     CBC and differential; Future  -     Comprehensive metabolic panel; Future  -     Lipid panel; Future  -     TSH, 3rd generation with T4 reflex; Future  -     UA w Reflex to Microscopic w Reflex to Culture -Lab Collect    Other orders  -     Cholecalciferol (VITAMIN D3) 5000 UNIT/ML LIQD; Take 1 tablet by mouth every other day  -     Ascorbic Acid (VITAMIN C) 500 MG CAPS; Take by mouth  -     Discontinue: norgestimate-ethinyl estradiol (TRI-SPRINTEC) 0 18/0 215/0 25 MG-35 MCG per tablet; Take by mouth  -     Prenatal Vit-Fe Fumarate-FA (KP PRENATAL MULTIVITAMINS) 28-0 8 MG TABS; Take 1 tablet by mouth daily  -     clonazePAM (KlonoPIN) 1 mg tablet; Take 1 tablet by mouth  -     Calcium 500 MG tablet; Take by mouth          Subjective:      Patient ID: aRdha Mitchell is a 44 y o  female  Patient is here for wellness exam   Patient is up-to-date with routine gynecologic care  Patient has had colonoscopy roughly 2 years ago due to Crohn's disease  Patient will be having another  1  Next year  Patient's tetanus status up-to-date  Last shot less than 10 years ago  Patient up-to-date with flu shot  The patient has recently gone for blood work which is including the QuantiFERON gold  Results pending  Labs reviewed  The following portions of the patient's history were reviewed and updated as appropriate: allergies, current medications, past family history, past medical history, past social history, past surgical history and problem list     Review of Systems   Constitutional: Negative  HENT: Negative  Eyes: Negative  Respiratory: Negative  Cardiovascular: Negative  Gastrointestinal: Negative  Endocrine: Negative  Genitourinary: Negative  Musculoskeletal: Negative  Skin: Negative  Allergic/Immunologic: Negative  Neurological: Negative  Hematological: Negative  Psychiatric/Behavioral: Negative  Objective:      /70 (BP Location: Left arm, Patient Position: Sitting, Cuff Size: Standard)   Temp 99 1 °F (37 3 °C) (Tympanic)   Ht 5' 5" (1 651 m)   Wt 71 2 kg (157 lb)   BMI 26 13 kg/m²          Physical Exam   Constitutional: She is oriented to person, place, and time  She appears well-developed and well-nourished  No distress  HENT:   Head: Normocephalic  Right Ear: External ear normal    Left Ear: External ear normal    Mouth/Throat: Oropharynx is clear and moist  No oropharyngeal exudate  Eyes: EOM are normal  Pupils are equal, round, and reactive to light  Right eye exhibits no discharge  Left eye exhibits no discharge  No scleral icterus  Neck: Normal range of motion  Neck supple  Thyromegaly present  Cardiovascular: Normal rate, regular rhythm, normal heart sounds and intact distal pulses  Exam reveals no gallop and no friction rub  No murmur heard  Pulmonary/Chest: Effort normal and breath sounds normal  No respiratory distress  She has no wheezes  She has no rales  She exhibits no tenderness  Abdominal: Soft  Bowel sounds are normal  She exhibits no distension  There is no tenderness  There is no rebound and no guarding  Musculoskeletal: Normal range of motion  She exhibits no edema or tenderness  Lymphadenopathy:     She has no cervical adenopathy  Neurological: She is oriented to person, place, and time  No cranial nerve deficit  She exhibits normal muscle tone  Coordination normal    Skin: Skin is warm and dry  No rash noted  She is not diaphoretic  No erythema  No pallor  Psychiatric: She has a normal mood and affect  Her behavior is normal  Judgment and thought content normal    Nursing note and vitals reviewed

## 2018-05-10 ENCOUNTER — HOSPITAL ENCOUNTER (OUTPATIENT)
Dept: ULTRASOUND IMAGING | Facility: MEDICAL CENTER | Age: 38
Discharge: HOME | End: 2018-05-10
Payer: COMMERCIAL

## 2018-05-10 DIAGNOSIS — N83.209 CYST OF OVARY, UNSPECIFIED LATERALITY: ICD-10-CM

## 2018-05-10 PROCEDURE — 76830 TRANSVAGINAL US NON-OB: CPT

## 2018-05-10 PROCEDURE — 76856 US EXAM PELVIC COMPLETE: CPT

## 2018-09-04 DIAGNOSIS — F51.01 PRIMARY INSOMNIA: ICD-10-CM

## 2018-09-04 RX ORDER — CLONAZEPAM 1 MG/1
1 TABLET ORAL
Qty: 90 TABLET | Refills: 0 | Status: SHIPPED | OUTPATIENT
Start: 2018-09-04 | End: 2018-12-20 | Stop reason: SDUPTHER

## 2018-12-19 ENCOUNTER — TELEPHONE (OUTPATIENT)
Dept: FAMILY MEDICINE CLINIC | Facility: CLINIC | Age: 38
End: 2018-12-19

## 2018-12-19 NOTE — TELEPHONE ENCOUNTER
Left voicemail for patient to return call for medication refill and to schedule appointment last ov was 5/8/2018

## 2018-12-20 DIAGNOSIS — F51.01 PRIMARY INSOMNIA: ICD-10-CM

## 2018-12-20 RX ORDER — CLONAZEPAM 1 MG/1
1 TABLET ORAL
Qty: 90 TABLET | Refills: 0 | Status: SHIPPED | OUTPATIENT
Start: 2018-12-20 | End: 2020-02-24 | Stop reason: SDUPTHER

## 2018-12-20 RX ORDER — CLONAZEPAM 1 MG/1
1 TABLET ORAL
Qty: 90 TABLET | Refills: 0 | Status: SHIPPED | OUTPATIENT
Start: 2018-12-20 | End: 2018-12-20 | Stop reason: SDUPTHER

## 2018-12-20 NOTE — TELEPHONE ENCOUNTER
Jorge Wheat is asking for refill on her clonazepam  She made an appointment on 1/2/2019, needed late and with the holidays was hard to schedule  Please address   Send to pharm on chart

## 2019-01-02 ENCOUNTER — OFFICE VISIT (OUTPATIENT)
Dept: FAMILY MEDICINE CLINIC | Facility: CLINIC | Age: 39
End: 2019-01-02

## 2019-01-02 VITALS
SYSTOLIC BLOOD PRESSURE: 110 MMHG | BODY MASS INDEX: 28.52 KG/M2 | HEIGHT: 65 IN | DIASTOLIC BLOOD PRESSURE: 80 MMHG | TEMPERATURE: 99.3 F | WEIGHT: 171.2 LBS

## 2019-01-02 DIAGNOSIS — K50.00 CROHN'S DISEASE OF SMALL INTESTINE WITHOUT COMPLICATION (HCC): ICD-10-CM

## 2019-01-02 DIAGNOSIS — F06.4 ANXIETY DISORDER DUE TO KNOWN PHYSIOLOGICAL CONDITION: ICD-10-CM

## 2019-01-02 DIAGNOSIS — E04.1 NONTOXIC SINGLE THYROID NODULE: Primary | ICD-10-CM

## 2019-01-02 DIAGNOSIS — Z23 ENCOUNTER FOR VACCINATION: ICD-10-CM

## 2019-01-02 DIAGNOSIS — F51.01 PRIMARY INSOMNIA: ICD-10-CM

## 2019-01-02 PROCEDURE — 99213 OFFICE O/P EST LOW 20 MIN: CPT | Performed by: FAMILY MEDICINE

## 2019-01-02 NOTE — PROGRESS NOTES
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    Other orders  -     Cancel: Flu vaccine greater than or equal to 2yo preservative free IM  -     Multiple Vitamins-Minerals (WOMENS MULTIVITAMIN) TABS; Take by mouth          Subjective:      Patient ID: Janusz Man is a 43 y o  female  Patient here to follow-up on thyroid nodules  Patient also with history of Crohn's disease  Patient is due for colonoscopy  The patient status post URI issue in December  Patient with some fatigue  The following portions of the patient's history were reviewed and updated as appropriate: allergies, current medications, past family history, past medical history, past social history, past surgical history and problem list     Review of Systems   Constitutional: Positive for fatigue  HENT: Negative  Eyes: Negative  Respiratory: Negative  Cardiovascular: Negative  Gastrointestinal: Negative  Endocrine: Negative  Genitourinary: Negative  Musculoskeletal: Negative  Skin: Negative  Allergic/Immunologic: Negative  Neurological: Negative  Hematological: Negative  Psychiatric/Behavioral: Negative            Objective:      /80 (BP Location: Right arm, Patient Position: Sitting, Cuff Size: Adult)   Temp 99 3 °F (37 4 °C) (Tympanic)   Ht 5' 5" (1 651 m)   Wt 77 7 kg (171 lb 3 2 oz)   LMP 12/18/2018 (Exact Date)   BMI 28 49 kg/m²          Physical Exam Constitutional: She is oriented to person, place, and time  She appears well-developed and well-nourished  No distress  HENT:   Head: Normocephalic  Right Ear: External ear normal    Left Ear: External ear normal    Mouth/Throat: Oropharynx is clear and moist  No oropharyngeal exudate  Eyes: Pupils are equal, round, and reactive to light  EOM are normal  Right eye exhibits no discharge  Left eye exhibits no discharge  No scleral icterus  Neck: Normal range of motion  Neck supple  Thyromegaly present  Thyroid nodules bilaterally   Cardiovascular: Normal rate, regular rhythm, normal heart sounds and intact distal pulses  Exam reveals no gallop and no friction rub  No murmur heard  Pulmonary/Chest: Effort normal and breath sounds normal  No respiratory distress  She has no wheezes  She has no rales  She exhibits no tenderness  Musculoskeletal: Normal range of motion  She exhibits no edema or tenderness  Lymphadenopathy:     She has no cervical adenopathy  Neurological: She is oriented to person, place, and time  No cranial nerve deficit  She exhibits normal muscle tone  Coordination normal    Skin: Skin is warm and dry  No rash noted  She is not diaphoretic  No erythema  No pallor  Psychiatric: She has a normal mood and affect  Her behavior is normal  Judgment and thought content normal    Nursing note and vitals reviewed

## 2019-10-23 ENCOUNTER — APPOINTMENT (OUTPATIENT)
Dept: URGENT CARE | Facility: MEDICAL CENTER | Age: 39
End: 2019-10-23

## 2019-10-23 ENCOUNTER — APPOINTMENT (OUTPATIENT)
Dept: LAB | Facility: MEDICAL CENTER | Age: 39
End: 2019-10-23

## 2019-10-23 DIAGNOSIS — Z02.1 PRE-EMPLOYMENT HEALTH SCREENING EXAMINATION: Primary | ICD-10-CM

## 2019-10-23 DIAGNOSIS — Z02.1 PRE-EMPLOYMENT HEALTH SCREENING EXAMINATION: ICD-10-CM

## 2019-10-23 PROCEDURE — 86762 RUBELLA ANTIBODY: CPT

## 2019-10-23 PROCEDURE — 86735 MUMPS ANTIBODY: CPT

## 2019-10-23 PROCEDURE — 86480 TB TEST CELL IMMUN MEASURE: CPT

## 2019-10-23 PROCEDURE — 86765 RUBEOLA ANTIBODY: CPT

## 2019-10-23 PROCEDURE — 36415 COLL VENOUS BLD VENIPUNCTURE: CPT

## 2019-10-23 PROCEDURE — 86787 VARICELLA-ZOSTER ANTIBODY: CPT

## 2019-10-24 LAB
MEV IGG SER QL: NORMAL
MUV IGG SER QL: ABNORMAL
RUBV IGG SERPL IA-ACNC: >175 IU/ML
VZV IGG SER IA-ACNC: NORMAL

## 2019-10-25 LAB
GAMMA INTERFERON BACKGROUND BLD IA-ACNC: 0.05 IU/ML
M TB IFN-G BLD-IMP: NEGATIVE
M TB IFN-G CD4+ BCKGRND COR BLD-ACNC: -0.01 IU/ML
M TB IFN-G CD4+ BCKGRND COR BLD-ACNC: 0.01 IU/ML
MITOGEN IGNF BCKGRD COR BLD-ACNC: >10 IU/ML

## 2019-11-12 ENCOUNTER — APPOINTMENT (OUTPATIENT)
Dept: LAB | Facility: MEDICAL CENTER | Age: 39
End: 2019-11-12

## 2019-11-12 ENCOUNTER — TRANSCRIBE ORDERS (OUTPATIENT)
Dept: ADMINISTRATIVE | Facility: HOSPITAL | Age: 39
End: 2019-11-12

## 2019-11-12 DIAGNOSIS — Z01.84 IMMUNITY STATUS TESTING: ICD-10-CM

## 2019-11-12 DIAGNOSIS — Z01.84 IMMUNITY STATUS TESTING: Primary | ICD-10-CM

## 2019-11-12 PROCEDURE — 86735 MUMPS ANTIBODY: CPT

## 2019-11-12 PROCEDURE — 36415 COLL VENOUS BLD VENIPUNCTURE: CPT

## 2019-11-14 LAB — MUV IGG SER QL: ABNORMAL

## 2019-11-25 ENCOUNTER — OFFICE VISIT (OUTPATIENT)
Dept: FAMILY MEDICINE CLINIC | Facility: CLINIC | Age: 39
End: 2019-11-25
Payer: COMMERCIAL

## 2019-11-25 VITALS
WEIGHT: 163 LBS | HEIGHT: 67 IN | SYSTOLIC BLOOD PRESSURE: 116 MMHG | TEMPERATURE: 98.1 F | BODY MASS INDEX: 25.58 KG/M2 | DIASTOLIC BLOOD PRESSURE: 86 MMHG

## 2019-11-25 DIAGNOSIS — N80.9 ENDOMETRIOSIS: ICD-10-CM

## 2019-11-25 DIAGNOSIS — K50.00 CROHN'S DISEASE OF SMALL INTESTINE WITHOUT COMPLICATION (HCC): Primary | ICD-10-CM

## 2019-11-25 DIAGNOSIS — E04.1 NONTOXIC SINGLE THYROID NODULE: ICD-10-CM

## 2019-11-25 DIAGNOSIS — F06.4 ANXIETY DISORDER DUE TO KNOWN PHYSIOLOGICAL CONDITION: ICD-10-CM

## 2019-11-25 PROCEDURE — 1036F TOBACCO NON-USER: CPT | Performed by: FAMILY MEDICINE

## 2019-11-25 PROCEDURE — 99214 OFFICE O/P EST MOD 30 MIN: CPT | Performed by: FAMILY MEDICINE

## 2019-11-25 RX ORDER — FLUOXETINE 10 MG/1
10 CAPSULE ORAL DAILY
Qty: 30 CAPSULE | Refills: 0 | Status: SHIPPED | OUTPATIENT
Start: 2019-11-25 | End: 2019-11-25 | Stop reason: SDUPTHER

## 2019-11-25 RX ORDER — FLUOXETINE 10 MG/1
10 CAPSULE ORAL DAILY
Refills: 5 | COMMUNITY
Start: 2019-11-10 | End: 2019-11-25 | Stop reason: SDUPTHER

## 2019-11-25 RX ORDER — FLUOXETINE 10 MG/1
10 CAPSULE ORAL DAILY
Qty: 90 CAPSULE | Refills: 1 | Status: SHIPPED | OUTPATIENT
Start: 2019-11-25 | End: 2020-02-24 | Stop reason: SDUPTHER

## 2019-11-25 NOTE — PROGRESS NOTES
Assessment/Plan:  Patient will follow with Dr Reny Babcock dermatologist   Patient will see gyn as well as GI  Thyroid ultrasound reviewed  Follow-up in 3 months       Diagnoses and all orders for this visit:    Crohn's disease of small intestine without complication ([facility] [address] )  -     Ambulatory referral to Gastroenterology; Future    Anxiety disorder due to known physiological condition  -     Discontinue: FLUoxetine (PROzac) 10 mg capsule; Take 1 capsule (10 mg total) by mouth daily  -     FLUoxetine (PROzac) 10 mg capsule; Take 1 capsule (10 mg total) by mouth daily    Nontoxic single thyroid nodule    Endometriosis  -     Ambulatory referral to Gynecology; Future    Other orders  -     Calcium Carbonate-Vitamin D (CALCIUM PLUS VITAMIN D PO); Take by mouth  -     Discontinue: FLUoxetine (PROzac) 10 mg capsule; Take 10 mg by mouth daily            Subjective:        Patient ID: Shae Dunn is a 35 y o  female  Patient is here to reestablish care  Patient has been on Prozac for the past few months for anxiety which seems to be helping  Patient is using less Klonopin at night for insomnia  Patient using have mg once to 2 times per week  Thyroid ultrasound recently done which was stable  Patient with history of Crohn's also  Patient ultrasound of the thyroid and pelvis done recently  The following portions of the patient's history were reviewed and updated as appropriate: allergies, current medications, past family history, past medical history, past social history, past surgical history and problem list       Review of Systems   Constitutional: Negative  HENT: Negative  Eyes: Negative  Respiratory: Negative  Cardiovascular: Negative  Gastrointestinal: Negative  Endocrine: Negative  Genitourinary: Negative  Musculoskeletal: Positive for arthralgias  Skin: Negative  Allergic/Immunologic: Negative  Neurological: Negative  Hematological: Negative  Psychiatric/Behavioral: Positive for sleep disturbance  Objective:      BMI Counseling: Body mass index is 25 91 kg/m²  The BMI is above normal  Nutrition recommendations include decreasing portion sizes  Exercise recommendations include moderate physical activity 150 minutes/week  BMI Counseling: Body mass index is 25 91 kg/m²  Follow-up plan was not completed due to patient refusing BMI follow-up plan  /86 (BP Location: Right arm, Patient Position: Sitting, Cuff Size: Adult)   Temp 98 1 °F (36 7 °C) (Tympanic)   Ht 5' 6 5" (1 689 m)   Wt 73 9 kg (163 lb)   LMP 11/18/2019 (Exact Date)   BMI 25 91 kg/m²          Physical Exam   Constitutional: She appears well-developed and well-nourished  No distress  HENT:   Head: Normocephalic  Right Ear: External ear normal    Left Ear: External ear normal    Mouth/Throat: Oropharynx is clear and moist  No oropharyngeal exudate  Eyes: Pupils are equal, round, and reactive to light  EOM are normal  Right eye exhibits no discharge  Left eye exhibits no discharge  No scleral icterus  Neck: Normal range of motion  Neck supple  No thyromegaly present  Cardiovascular: Normal rate, regular rhythm, normal heart sounds and intact distal pulses  Exam reveals no gallop and no friction rub  No murmur heard  Pulmonary/Chest: Effort normal and breath sounds normal  No respiratory distress  She has no wheezes  She has no rales  She exhibits no tenderness  Abdominal: Soft  Bowel sounds are normal  She exhibits no distension  There is no tenderness  There is no rebound and no guarding  Musculoskeletal: Normal range of motion  She exhibits no edema or tenderness  Lymphadenopathy:     She has no cervical adenopathy  Neurological: She is alert  No cranial nerve deficit  She exhibits normal muscle tone  Coordination normal    Skin: Skin is warm and dry  No rash noted  She is not diaphoretic  No erythema  No pallor     Psychiatric: She has a normal mood and affect  Her behavior is normal  Judgment and thought content normal    Nursing note and vitals reviewed

## 2020-01-07 ENCOUNTER — OFFICE VISIT (OUTPATIENT)
Dept: OBGYN CLINIC | Facility: MEDICAL CENTER | Age: 40
End: 2020-01-07
Payer: COMMERCIAL

## 2020-01-07 VITALS
SYSTOLIC BLOOD PRESSURE: 120 MMHG | WEIGHT: 167 LBS | DIASTOLIC BLOOD PRESSURE: 80 MMHG | BODY MASS INDEX: 27.82 KG/M2 | HEIGHT: 65 IN

## 2020-01-07 DIAGNOSIS — N80.9 ENDOMETRIOSIS: ICD-10-CM

## 2020-01-07 DIAGNOSIS — N83.201 RIGHT OVARIAN CYST: Primary | ICD-10-CM

## 2020-01-07 PROCEDURE — 99203 OFFICE O/P NEW LOW 30 MIN: CPT | Performed by: OBSTETRICS & GYNECOLOGY

## 2020-01-07 NOTE — PROGRESS NOTES
Assessment:  35 y o  [id number] who presents as follow up of right ovarian cysts in setting of known endometriosis  Plan:  Diagnoses and all orders for this visit:    Right ovarian cyst  Endometriosis  -     US pelvis complete non OB; Future  - Management based on results; briefly reviewed options      __________________________________________________________________    Subjective   Mason Quintero is a 35 y o  [id number] who presents as a follow up of ovarian cyst      Patient reports a hx of endometriosis for many years  Previously on cOCPs but taken off of them due to breast leaking  Subsequent exams/imaging were benign, but she remained off of this medication  She also is s/p laparoscopy for same previously  She had been doing well off medication until the recent years  She notes now mid cycle spotting x1yr, heavy prolonged menses x3yrs  Very painful menses  Was evaluated by pelvic US most recently in September, which noted two cysts  She presents today to follow them up  She notes currently she does have some mild pelvic pain and pressure  Its improved since September, but not fully resolved  No radiation elsewhere  Hasn't tried anything for the discomfort  Reviewed recommendation for repeat imaging to assess for change or resolution, as several months have passed  Reviewed briefly treatment options for endometriosis in general, both surgical and medical      The following portions of the patient's history were reviewed and updated as appropriate: allergies, current medications, past medical history, past social history, past surgical history and problem list     Review of Systems  Review of Systems   Gastrointestinal: Negative for abdominal distention, abdominal pain, constipation, diarrhea and nausea  Genitourinary: Positive for menstrual problem and pelvic pain   Negative for difficulty urinating, dysuria, flank pain, frequency, genital sores, hematuria, vaginal bleeding, vaginal discharge and vaginal pain  Objective  /80 (BP Location: Right arm, Patient Position: Sitting, Cuff Size: Standard)   Ht 5' 5" (1 651 m)   Wt 75 8 kg (167 lb)   LMP 12/16/2019   BMI 27 79 kg/m²      Physical Exam:  Physical Exam   Constitutional: She is oriented to person, place, and time  She appears well-developed and well-nourished  No distress  HENT:   Head: Normocephalic and atraumatic  Eyes: No scleral icterus  Cardiovascular: Normal rate  Pulmonary/Chest: Effort normal  No accessory muscle usage  No respiratory distress  Abdominal: Soft  She exhibits no distension  There is no tenderness  There is no rebound and no guarding  Genitourinary: Uterus normal  There is no rash, tenderness or lesion on the right labia  There is no rash, tenderness or lesion on the left labia  Uterus is not deviated, not enlarged, not fixed and not tender  Cervix exhibits no motion tenderness, no discharge and no friability  Right adnexum displays tenderness and fullness  Left adnexum displays no tenderness and no fullness  No erythema, tenderness or bleeding in the vagina  No signs of injury around the vagina  No vaginal discharge found  Musculoskeletal: She exhibits no edema or tenderness  Neurological: She is alert and oriented to person, place, and time  Skin: Skin is warm and dry  No rash noted  No erythema  Psychiatric: She has a normal mood and affect   Her behavior is normal          Imaging  Pelvic US (9/17/19) - [facility], available in media   Notable for 2x right ovarian cysts  6 1cm and 5 3cm in largest diameter each  Consistent with endometrioma vs hemorrhagic cyst

## 2020-01-27 ENCOUNTER — HOSPITAL ENCOUNTER (OUTPATIENT)
Dept: ULTRASOUND IMAGING | Facility: MEDICAL CENTER | Age: 40
Discharge: HOME | End: 2020-01-27
Payer: COMMERCIAL

## 2020-01-27 DIAGNOSIS — N83.201 RIGHT OVARIAN CYST: ICD-10-CM

## 2020-01-27 PROCEDURE — 76856 US EXAM PELVIC COMPLETE: CPT

## 2020-01-27 PROCEDURE — 76830 TRANSVAGINAL US NON-OB: CPT

## 2020-01-29 ENCOUNTER — CONSULT (OUTPATIENT)
Dept: GASTROENTEROLOGY | Facility: MEDICAL CENTER | Age: 40
End: 2020-01-29
Payer: COMMERCIAL

## 2020-01-29 VITALS
WEIGHT: 164 LBS | BODY MASS INDEX: 25.74 KG/M2 | TEMPERATURE: 98.7 F | HEART RATE: 87 BPM | HEIGHT: 67 IN | DIASTOLIC BLOOD PRESSURE: 74 MMHG | SYSTOLIC BLOOD PRESSURE: 116 MMHG

## 2020-01-29 DIAGNOSIS — K50.80 CROHN'S DISEASE OF BOTH SMALL AND LARGE INTESTINE WITHOUT COMPLICATION (HCC): Primary | ICD-10-CM

## 2020-01-29 PROCEDURE — 99244 OFF/OP CNSLTJ NEW/EST MOD 40: CPT | Performed by: INTERNAL MEDICINE

## 2020-01-29 NOTE — PROGRESS NOTES
[facility] - Outpatient Consultation  Debra Carrillo 35 y o  female MRN: [id number]  Encounter: [id number]          ASSESSMENT AND PLAN:      1  Crohn's disease of both small and large intestine without complication (HCC)  Crohn's disease was diagnosed at age 15, and she reports history of small intestine: Involvement  Prior treatments included high-dose steroids and TPN initially  She has previously treated with 5 ASA agents Pentasa and Asacol which were discontinued GI upset  She was then transitioned to Imuran, dose unknown, for 6-8 and this medication was discontinued due to a diagnosis of melanoma  She has been off all medications for the last 12 years  She reports some intermittent right lower quadrant discomfort which she attributes to her ovarian cyst, otherwise is without GI complaints  We discussed the natural history and management for inflammatory bowel disease today  Her last colonoscopy in 2016 was endoscopically normal, biopsies were obtained however pathology is not available for review  Patient states that there was no abnormalities on prior biopsies  I recommend repeat colonoscopy with surveillance biopsies given that her last exam was 4 years ago  If her colonoscopy is normal we can discuss her next interval for colonoscopy, likely every 1-3 years however since she has been off medication and history of disease diagnosis greater than 10 years ago she may require shorter intervals for her exams  She does have history of extra intestinal manifestations he currently reports some intermittent joint complaints for which she uses ibuprofen as needed  We discussed that Ibuprofen is not recommended given her history of IBD  Given her prior long-term steroid use for inflammatory bowel disease and orbital disease recommended DEXA scan for evaluation    She recently had vitamin levels and fecal calprotectin within the last year and will due for repeat of this testing at her next visit  Informed consent was obtained for the procedure  Risks of infection, perforation and hemorrhage were discussed  The patient was agreeable to proceed with the procedure  - DXA bone density spine hip and pelvis; Future given history of prior longterm steroid use  Continue vitamin D-calcium supplementation  - Colonoscopy; Future  - MiraLax/Gatorade/Dulcolax instructions provided  - continue to remain off medications  If colonoscopy shows active disease we will discuss treatment options at the next visit  - Health maintenance:    - Yearly influenza vaccine and pneumonia vaccines per primary care provider     - Yearly skin exams      - Yearly opthalmologic exams   - Pap smear per GYN  Return to office after procedure      ______________________________________________________________________    HPI:  Gemini Arreaga is a 35 y o  female with a history of Crohn's disease of the small and large intestine, diagnosed 23 years ago currently off while medication for 12 years, who presents today to establish care  IBD history:  She states she was diagnosed with Crohn's disease at age 15 after presenting with abdominal pain and bloody bowel movements  She had previously had GI issues several years prior to this  She reports undergoing a colonoscopy he was diagnosed with inflammatory bowel disease of the small large intestine  She was initially on high-dose steroids for several years and TPN for bowel rest   She was ultimately transition to 5 ASA agents including Pentasa (16 pills per day _and Asacol  However these were discontinued due to nausea  She was ultimately placed on Imuran, the dose of which she cannot recall  She remain on Imuran to her age 24s up until the time that she was trying to get pregnant and discontinue the medication  She resumed the medication after her pregnancy and at age 28 was diagnosed with melanoma    Afterwards the Imuran was discontinued and she has been off all medications since  She does note being diagnosed with avascular necrosis given her long use of steroids early on in her disease course  She has no history of fistula or perianal disease    Prior treatments  5ASA: Pentasa, Asacol initially at diagnosis  Stopped due to nausea and GI upset  Steroids: yes  None for 10 years  Imuran: yes, on for 6-8 years  Stopped due to history of melanoma  Biologics: none prior     Extra intestinal manifestations: At the time of her diagnosis she presented also with erythema nodosum  In her early 26 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  For this she received high-dose of oral prednisone 80 mg daily  It has been 10 years since her last steroid use     She does note intermittent joint discomfort particularly her ankles and knees with intermittent swelling for which he uses ibuprofen as needed  Current symptoms: Her bowel movements typically occur once per day and are formed  She notes very rare bright red blood per rectum on the toilet paper after wiping due to hemorrhoids that she acquired after her pregnancy  She reports her Crohn's pain is periumbilical sharp and nonradiating and occurs rarely  She has had new right lower quadrant abdominal discomfort within the last year for which she is being followed by GYN for a ovarian cyst   She otherwise denies nausea, vomiting, decreased appetite or unintentional weight loss  Her last fecal calprotectin in August 2019 was normal     She has a family history of a maternal grandmother with small intestine disease on cold unsure if diagnosed with inflammatory bowel disease  She otherwise has no gastrointestinal disease in her family    Her last colonoscopy was in 2016 which was endoscopically normal   Biopsies were obtained however pathology is not available for review    She states she was told pathology was normal   She reports in the past having polyps  But is unclear if these are adenomatous polyps or pseudopolyps  She states her last DEXA scan was several years ago and was normal      REVIEW OF SYSTEMS:    CONSTITUTIONAL: Denies any fever, chills, rigors, and weight loss  HEENT: No earache or tinnitus  Denies hearing loss or visual disturbances  CARDIOVASCULAR: No chest pain or palpitations  RESPIRATORY: Denies any cough, hemoptysis, shortness of breath or dyspnea on exertion  GASTROINTESTINAL: As noted in the History of Present Illness  GENITOURINARY: No problems with urination  Denies any hematuria or dysuria  NEUROLOGIC: No dizziness or vertigo, denies headaches  MUSCULOSKELETAL: Denies any muscle or joint pain  SKIN: Denies skin rashes or itching  ENDOCRINE: Denies excessive thirst  Denies intolerance to heat or cold  PSYCHOSOCIAL: Denies depression or anxiety  Denies any recent memory loss         Historical Information   Past Medical History:   Diagnosis Date    Anemia     Arthritis     [facility] [address] [facility]     Crohn's disease [facility] [address] [facility]     Endometriosis     GERD (gastroesophageal reflux disease)     Gestational diabetes mellitus     Malignant melanoma ([facility] [address] ) 2008    on back, had wide excision    Orbital myositis     Right ovarian cyst     Thyroid disease      Past Surgical History:   Procedure Laterality Date     SECTION      DENTAL SURGERY      DIAGNOSTIC LAPAROSCOPY  2003    for endometriosis    DILATION AND CURETTAGE OF UTERUS      HERNIA REPAIR  2007    KNEE SURGERY      SKIN LESION EXCISION  2008    melanoma of back     Social History   Social History     Substance and Sexual Activity   Alcohol Use Yes    Comment: Social use     Social History     Substance and Sexual Activity   Drug Use No     Social History     Tobacco Use   Smoking Status Never Smoker   Smokeless Tobacco Never Used     Family History   Problem Relation Age of Onset    Diabetes Mother    [facility] Hypertension Mother     Anxiety disorder Family     Coronary artery disease Family     Depression Family     Stroke Family         stroke syndrome       Meds/Allergies       Current Outpatient Medications:     Ascorbic Acid (VITAMIN C) 500 MG CAPS    Calcium Carbonate-Vitamin D (CALCIUM PLUS VITAMIN D PO)    clonazePAM (KlonoPIN) 1 mg tablet    FLUoxetine (PROzac) 10 mg capsule    Multiple Vitamins-Minerals (WOMENS MULTIVITAMIN) TABS    Allergies   Allergen Reactions    Other      Annotation - [id number]: Rye bug spray    Tape  [Medical Tape]            Objective     Blood pressure 116/74, pulse 87, temperature 98 7 °F (37 1 °C), temperature source Tympanic, height 5' 6 5" (1 689 m), weight 74 4 kg (164 lb)  Body mass index is 26 07 kg/m²  PHYSICAL EXAM:      General Appearance:   Well appearing middle aged female, Alert, cooperative, no distress   HEENT:   Normocephalic, atraumatic, anicteric  Neck:  Supple, symmetrical, trachea midline   Lungs:   Clear to auscultation bilaterally; no rales, rhonchi or wheezing; respirations unlabored    Heart[de-identified]   Regular rate and rhythm; no murmur, rub, or gallop  Abdomen:   Soft, non-tender, non-distended; normal bowel sounds; no masses, no organomegaly    Genitalia:   Deferred    Rectal:   Deferred    Extremities:  No cyanosis, clubbing or edema    Pulses:  2+ and symmetric    Skin:  No jaundice, rashes, or lesions    Lymph nodes:  No palpable cervical lymphadenopathy        Lab Results:   No visits with results within 1 Day(s) from this visit  Latest known visit with results is:   Appointment on 11/12/2019   Component Date Value    Mumps IgG 11/12/2019 NON-IMMUNE*         Radiology Results:   No results found

## 2020-01-30 ENCOUNTER — OFFICE VISIT (OUTPATIENT)
Dept: OBGYN CLINIC | Facility: MEDICAL CENTER | Age: 40
End: 2020-01-30
Payer: COMMERCIAL

## 2020-01-30 VITALS
BODY MASS INDEX: 25.9 KG/M2 | SYSTOLIC BLOOD PRESSURE: 120 MMHG | DIASTOLIC BLOOD PRESSURE: 82 MMHG | HEIGHT: 67 IN | WEIGHT: 165 LBS

## 2020-01-30 DIAGNOSIS — N80.9 ENDOMETRIOSIS: Primary | ICD-10-CM

## 2020-01-30 PROCEDURE — 99213 OFFICE O/P EST LOW 20 MIN: CPT | Performed by: OBSTETRICS & GYNECOLOGY

## 2020-01-30 PROCEDURE — 1036F TOBACCO NON-USER: CPT | Performed by: OBSTETRICS & GYNECOLOGY

## 2020-01-30 RX ORDER — NORGESTIMATE AND ETHINYL ESTRADIOL 7DAYSX3 LO
1 KIT ORAL DAILY
Qty: 28 TABLET | Refills: 6 | Status: SHIPPED | OUTPATIENT
Start: 2020-01-30 | End: 2020-06-22 | Stop reason: HOSPADM

## 2020-01-30 NOTE — PROGRESS NOTES
Assessment:  35 y o  female who presents as a follow up of endometrioma and endometriosis  Plan:  Diagnoses and all orders for this visit:    Endometriosis  -     norgestimate-ethinyl estradiol (ORTHO TRI-CYCLEN LO) 0 18/0 215/0 25 MG-25 MCG per tablet; Take 1 tablet by mouth daily  - Counseled on medical and surgical options  Declines surgery at present  - Return in 3mo for interval assessment after starting OCPs          __________________________________________________________________    Subjective   Mary Flurry is a 35 y o  female who presents to follow up endometrioma and endometriosis  Patient reports she feels well overall  Has some pressure and mild discomfort, which is largely unchanged  She notes she does not expect her cyst to be different based on these symptoms  She notes most days shes not particularly bothered by these symptoms  Reviewed ultrasound findings in detail  Reviewed persistance of cyst in largely unchanged nature  Discussed prior reviewed treatment options - surgical excision of cyst, fulguration of endometriosis, medical management of endometriosis symptoms with contraception, and observation  Patient notes because her symptoms are overall mild, shes not strongly considering surgical management  She does want to revisit discussion of contraception, as it helped her symptoms in the past  Reviewed options and she would like to consider OCPs  Advised risk of cyst rupture or torsion, which can cause acute pain  Can consider proceeding surgically with complications, worsening pain, or personal preference  Otherwise recommend regular surveillance of cyst by US  Patient had questions answered to her satisfaction       The following portions of the patient's history were reviewed and updated as appropriate: allergies, current medications, past medical history, past social history, past surgical history and problem list     Review of Systems  Review of Systems   Gastrointestinal: Negative for abdominal distention, abdominal pain, constipation, diarrhea and nausea  Genitourinary: Positive for pelvic pain  Negative for dysuria, genital sores, menstrual problem, vaginal bleeding, vaginal discharge and vaginal pain  Objective  /82 (BP Location: Right arm, Patient Position: Sitting, Cuff Size: Standard)   Ht 5' 6 5" (1 689 m)   Wt 74 8 kg (165 lb)   LMP 01/10/2020   BMI 26 23 kg/m²      Physical Exam:  Physical Exam   Constitutional: She is oriented to person, place, and time  Vital signs are normal  She appears well-developed and well-nourished  No distress  HENT:   Head: Normocephalic and atraumatic  Eyes: No scleral icterus  Cardiovascular: Normal rate  Pulmonary/Chest: Effort normal  No accessory muscle usage  No respiratory distress  Abdominal: Soft  She exhibits no distension  There is no tenderness  There is no rebound and no guarding  Neurological: She is alert and oriented to person, place, and time  Skin: Skin is warm and dry  No rash noted  She is not diaphoretic  No erythema  No pallor  Psychiatric: She has a normal mood and affect   Her behavior is normal  Judgment and thought content normal

## 2020-01-31 ENCOUNTER — TELEPHONE (OUTPATIENT)
Dept: OTHER | Facility: OTHER | Age: 40
End: 2020-01-31

## 2020-01-31 ENCOUNTER — HOSPITAL ENCOUNTER (EMERGENCY)
Facility: HOSPITAL | Age: 40
Discharge: HOME | End: 2020-01-31
Attending: EMERGENCY MEDICINE | Admitting: EMERGENCY MEDICINE
Payer: COMMERCIAL

## 2020-01-31 ENCOUNTER — APPOINTMENT (EMERGENCY)
Dept: ULTRASOUND IMAGING | Facility: HOSPITAL | Age: 40
End: 2020-01-31
Payer: COMMERCIAL

## 2020-01-31 VITALS
RESPIRATION RATE: 18 BRPM | OXYGEN SATURATION: 95 % | TEMPERATURE: 97.7 F | SYSTOLIC BLOOD PRESSURE: 113 MMHG | HEART RATE: 101 BPM | DIASTOLIC BLOOD PRESSURE: 71 MMHG

## 2020-01-31 DIAGNOSIS — N83.209 HEMORRHAGIC OVARIAN CYST: Primary | ICD-10-CM

## 2020-01-31 DIAGNOSIS — N83.201 RIGHT OVARIAN CYST: ICD-10-CM

## 2020-01-31 DIAGNOSIS — N83.519 OVARY, TORSION: ICD-10-CM

## 2020-01-31 LAB
ALBUMIN SERPL BCP-MCNC: 4 G/DL (ref 3.5–5)
ALP SERPL-CCNC: 79 U/L (ref 46–116)
ALT SERPL W P-5'-P-CCNC: 23 U/L (ref 12–78)
ANION GAP SERPL CALCULATED.3IONS-SCNC: 12 MMOL/L (ref 4–13)
AST SERPL W P-5'-P-CCNC: 33 U/L (ref 5–45)
BASOPHILS # BLD AUTO: 0.03 THOUSANDS/ΜL (ref 0–0.1)
BASOPHILS NFR BLD AUTO: 0 % (ref 0–1)
BILIRUB SERPL-MCNC: 0.98 MG/DL (ref 0.2–1)
BILIRUB UR QL STRIP: NEGATIVE
BUN SERPL-MCNC: 11 MG/DL (ref 5–25)
CALCIUM SERPL-MCNC: 9.7 MG/DL (ref 8.3–10.1)
CHLORIDE SERPL-SCNC: 103 MMOL/L (ref 100–108)
CLARITY UR: CLEAR
CO2 SERPL-SCNC: 26 MMOL/L (ref 21–32)
COLOR UR: YELLOW
CREAT SERPL-MCNC: 0.74 MG/DL (ref 0.6–1.3)
EOSINOPHIL # BLD AUTO: 0.12 THOUSAND/ΜL (ref 0–0.61)
EOSINOPHIL NFR BLD AUTO: 1 % (ref 0–6)
ERYTHROCYTE [DISTWIDTH] IN BLOOD BY AUTOMATED COUNT: 13.2 % (ref 11.6–15.1)
EXT PREG TEST URINE: NEGATIVE
EXT. CONTROL ED NAV: NORMAL
GFR SERPL CREATININE-BSD FRML MDRD: 102 ML/MIN/1.73SQ M
GLUCOSE SERPL-MCNC: 106 MG/DL (ref 65–140)
GLUCOSE UR STRIP-MCNC: NEGATIVE MG/DL
HCT VFR BLD AUTO: 44 % (ref 34.8–46.1)
HGB BLD-MCNC: 14 G/DL (ref 11.5–15.4)
HGB UR QL STRIP.AUTO: NEGATIVE
IMM GRANULOCYTES # BLD AUTO: 0.05 THOUSAND/UL (ref 0–0.2)
IMM GRANULOCYTES NFR BLD AUTO: 0 % (ref 0–2)
KETONES UR STRIP-MCNC: ABNORMAL MG/DL
LEUKOCYTE ESTERASE UR QL STRIP: NEGATIVE
LYMPHOCYTES # BLD AUTO: 1.73 THOUSANDS/ΜL (ref 0.6–4.47)
LYMPHOCYTES NFR BLD AUTO: 15 % (ref 14–44)
MCH RBC QN AUTO: 28.9 PG (ref 26.8–34.3)
MCHC RBC AUTO-ENTMCNC: 31.8 G/DL (ref 31.4–37.4)
MCV RBC AUTO: 91 FL (ref 82–98)
MONOCYTES # BLD AUTO: 0.42 THOUSAND/ΜL (ref 0.17–1.22)
MONOCYTES NFR BLD AUTO: 4 % (ref 4–12)
NEUTROPHILS # BLD AUTO: 9.49 THOUSANDS/ΜL (ref 1.85–7.62)
NEUTS SEG NFR BLD AUTO: 80 % (ref 43–75)
NITRITE UR QL STRIP: NEGATIVE
NRBC BLD AUTO-RTO: 0 /100 WBCS
PH UR STRIP.AUTO: 5.5 [PH] (ref 4.5–8)
PLATELET # BLD AUTO: 325 THOUSANDS/UL (ref 149–390)
PMV BLD AUTO: 9.8 FL (ref 8.9–12.7)
POTASSIUM SERPL-SCNC: 3.9 MMOL/L (ref 3.5–5.3)
PROT SERPL-MCNC: 8.4 G/DL (ref 6.4–8.2)
PROT UR STRIP-MCNC: NEGATIVE MG/DL
RBC # BLD AUTO: 4.84 MILLION/UL (ref 3.81–5.12)
SODIUM SERPL-SCNC: 141 MMOL/L (ref 136–145)
SP GR UR STRIP.AUTO: >=1.03 (ref 1–1.03)
UROBILINOGEN UR QL STRIP.AUTO: 0.2 E.U./DL
WBC # BLD AUTO: 11.84 THOUSAND/UL (ref 4.31–10.16)

## 2020-01-31 PROCEDURE — 99242 OFF/OP CONSLTJ NEW/EST SF 20: CPT | Performed by: OBSTETRICS & GYNECOLOGY

## 2020-01-31 PROCEDURE — 76856 US EXAM PELVIC COMPLETE: CPT

## 2020-01-31 PROCEDURE — 96375 TX/PRO/DX INJ NEW DRUG ADDON: CPT

## 2020-01-31 PROCEDURE — 99284 EMERGENCY DEPT VISIT MOD MDM: CPT

## 2020-01-31 PROCEDURE — 96374 THER/PROPH/DIAG INJ IV PUSH: CPT

## 2020-01-31 PROCEDURE — 85025 COMPLETE CBC W/AUTO DIFF WBC: CPT | Performed by: EMERGENCY MEDICINE

## 2020-01-31 PROCEDURE — 81003 URINALYSIS AUTO W/O SCOPE: CPT

## 2020-01-31 PROCEDURE — 76830 TRANSVAGINAL US NON-OB: CPT

## 2020-01-31 PROCEDURE — 80053 COMPREHEN METABOLIC PANEL: CPT | Performed by: EMERGENCY MEDICINE

## 2020-01-31 PROCEDURE — 99284 EMERGENCY DEPT VISIT MOD MDM: CPT | Performed by: EMERGENCY MEDICINE

## 2020-01-31 PROCEDURE — 36415 COLL VENOUS BLD VENIPUNCTURE: CPT | Performed by: EMERGENCY MEDICINE

## 2020-01-31 PROCEDURE — 81025 URINE PREGNANCY TEST: CPT | Performed by: EMERGENCY MEDICINE

## 2020-01-31 RX ORDER — KETOROLAC TROMETHAMINE 30 MG/ML
15 INJECTION, SOLUTION INTRAMUSCULAR; INTRAVENOUS ONCE
Status: COMPLETED | OUTPATIENT
Start: 2020-01-31 | End: 2020-01-31

## 2020-01-31 RX ORDER — ONDANSETRON 2 MG/ML
INJECTION INTRAMUSCULAR; INTRAVENOUS
Status: COMPLETED
Start: 2020-01-31 | End: 2020-01-31

## 2020-01-31 RX ORDER — ONDANSETRON 2 MG/ML
4 INJECTION INTRAMUSCULAR; INTRAVENOUS ONCE
Status: COMPLETED | OUTPATIENT
Start: 2020-01-31 | End: 2020-01-31

## 2020-01-31 RX ORDER — MORPHINE SULFATE 4 MG/ML
4 INJECTION, SOLUTION INTRAMUSCULAR; INTRAVENOUS ONCE
Status: COMPLETED | OUTPATIENT
Start: 2020-01-31 | End: 2020-01-31

## 2020-01-31 RX ORDER — IBUPROFEN 600 MG/1
600 TABLET ORAL EVERY 6 HOURS PRN
Qty: 50 TABLET | Refills: 2 | Status: SHIPPED | OUTPATIENT
Start: 2020-01-31 | End: 2020-04-21

## 2020-01-31 RX ORDER — OXYCODONE HYDROCHLORIDE AND ACETAMINOPHEN 5; 325 MG/1; MG/1
1 TABLET ORAL EVERY 6 HOURS PRN
Qty: 15 TABLET | Refills: 0 | Status: SHIPPED | OUTPATIENT
Start: 2020-01-31 | End: 2020-02-10

## 2020-01-31 RX ADMIN — ONDANSETRON 4 MG: 2 INJECTION INTRAMUSCULAR; INTRAVENOUS at 12:51

## 2020-01-31 RX ADMIN — KETOROLAC TROMETHAMINE 15 MG: 30 INJECTION, SOLUTION INTRAMUSCULAR at 12:51

## 2020-01-31 RX ADMIN — MORPHINE SULFATE 4 MG: 4 INJECTION INTRAVENOUS at 12:43

## 2020-01-31 NOTE — CONSULTS
Consult - OB/GYN   Qianalabill Stroud 35 y o  female MRN: [id number]  Unit/Bed#: ED 28 Encounter: [id number]    Chief complaint: abdominal pain    HPI: Wilfredo Mena is a 38y/o who presents with sudden onset abdominal pain earlier this afternoon  Patient states that she was sitting at her desk when the pain started  It is described as a sharp, stabbing pain located in her right lower abdomen and suprapubic region  The pain radiates into her shoulder as well  Patient had associated nausea and vomited 4 times since the pain started  While in the ED, she received toradol and morphine for pain control as well as Zofran for nausea  At this time, the pain is much better but still present  She denies vaginal discharge or bleeding at this time  Of note, patient has known right ovarian cysts that were being managed outpatient  She was prescribed OCPs yesterday which she has not yet started      Active Problems:  Patient Active Problem List   Diagnosis    Anxiety disorder    Avascular bone necrosis ([address]    Autoimmune disease ([address] )    Varicose veins    Nontoxic single thyroid nodule    Pulsatile tinnitus of left ear    Pulsatile tinnitus of right ear    Malignant melanoma of back ([address] )    Crohn's disease ([address] )    Endometriosis    Wellness examination       PMH:  Past Medical History:   Diagnosis Date    Anemia     Arthritis     Avascular necrosis ([address] )     Crohn's disease ([address] )     Endometriosis     GERD (gastroesophageal reflux disease)     Gestational diabetes mellitus     Malignant melanoma ([address] ) 2008    on back, had wide excision    Orbital myositis     Right ovarian cyst     Thyroid disease        PSH:  Past Surgical History:   Procedure Laterality Date     SECTION      DENTAL SURGERY      DIAGNOSTIC LAPAROSCOPY  2003    for endometriosis    DILATION AND CURETTAGE OF UTERUS      HERNIA REPAIR  2007    KNEE SURGERY      SKIN LESION EXCISION  2008    melanoma of back Meds:  No current facility-administered medications on file prior to encounter  Current Outpatient Medications on File Prior to Encounter   Medication Sig Dispense Refill    Ascorbic Acid (VITAMIN C) 500 MG CAPS Take by mouth      Calcium Carbonate-Vitamin D (CALCIUM PLUS VITAMIN D PO) Take by mouth      clonazePAM (KlonoPIN) 1 mg tablet Take 1 tablet (1 mg total) by mouth daily at bedtime (Patient taking differently: Take 1 mg by mouth daily at bedtime Patient only takes PRN a couple of times per week ) 90 tablet 0    FLUoxetine (PROzac) 10 mg capsule Take 1 capsule (10 mg total) by mouth daily 90 capsule 1    Multiple Vitamins-Minerals (WOMENS MULTIVITAMIN) TABS Take by mouth      norgestimate-ethinyl estradiol (ORTHO TRI-CYCLEN LO) 0 18/0 215/0 25 MG-25 MCG per tablet Take 1 tablet by mouth daily 28 tablet 6       Allergies: Allergies   Allergen Reactions    Other      Annotation - [id number]: Maurizio Darlingma bug spray    Tape  [Medical Tape]        Physical Exam:  /63 (BP Location: Right arm)   Pulse 101   Temp 97 7 °F (36 5 °C) (Oral)   Resp 18   LMP 01/10/2020   SpO2 97%     Physical Exam   Constitutional: She appears well-nourished  HENT:   Head: Normocephalic and atraumatic  Cardiovascular: Normal rate, regular rhythm and normal heart sounds  Pulmonary/Chest: Breath sounds normal  No respiratory distress  She has no wheezes  Abdominal: Soft  Bowel sounds are normal  She exhibits no distension and no mass  There is tenderness  There is no rebound  Genitourinary:   Genitourinary Comments: Bimanual exam: mild tenderness to palpation of fundus and right adnexa; no CMT  No adnexal fullness  Imaging   US Pelvic complete w/ transvaginal 1/31/20: Flow is demonstrated to the ovaries bilaterally      Right ovarian hemorrhagic cyst, overall similar in size since the prior examination      Small to moderate amount of hemorrhagic free fluid within the pelvis      Redemonstration of a complex cystic structure in the right adnexal region which appears tubular in nature, as described above  Findings are slightly more heterogeneous since the prior examination and is favored to be on the basis of degenerating blood   products within hydrosalpinx as correlated with prior ultrasounds      Heterogeneous appearance of the uterus is again noted with subendometrial cystic change which can be seen in setting of adenomyosis  Assessment:   35 y o  with known ovarian cyst who presents with abdominal pain  At this time, pain is well controlled, CBC stable  Discussed inpatient vs outpatient management of likely ruptured cyst  Patient would like outpatient management  Plan:   1  Motrin 600mg q6h PRN  2  Percocet 5-10mg q6h PRN  3  Repeat CBC tomorrow  4  Plan for repeat ultrasound in 6-8 weeks  5  Patient to report any change in symptoms or worsening of pain, chest pain, SOB, palpitations, etc     Discussed with Dr Juan Luis Lares Che, MD, PGY-2  1/31/2020  4:20 PM

## 2020-01-31 NOTE — ED NOTES
Pt transported to [address] [place] [address] [address]          Margaret Cline RN  01/31/20 4781

## 2020-01-31 NOTE — DISCHARGE INSTRUCTIONS
Ovarian Cyst   WHAT YOU NEED TO KNOW:   An ovarian cyst is a sac that grows on an ovary  This sac usually contains fluid, but may sometimes have blood or tissue in it  Most ovarian cysts are harmless and go away without treatment in a few months  Some cysts can grow large, cause pain, or break open  DISCHARGE INSTRUCTIONS:   Call 911 for any of the following: You are too weak or dizzy to stand up  Return to the emergency department if:   You have severe abdominal pain  The pain may be sharp and sudden  You have a fever  Contact your healthcare provider if:   Your periods are early, late, or more painful than usual     You have bleeding from your vagina that is not your period  You have abdominal pain all the time  Your abdomen is swollen  You have feelings of fullness, pressure, or discomfort in your abdomen  You have trouble urinating or emptying your bladder completely  You have pain during sex  You are losing weight without trying  You have questions or concerns about your condition or care

## 2020-01-31 NOTE — ED PROVIDER NOTES
History  Chief Complaint   Patient presents with    Pelvic Pain     Pt states that about 1 hour she was attempting to move her bowels and while doing so she started having severe right sided pelvic pain along with vomiting  States she has a known right sided ovarian cyst       36 yo female c/o sudden onset severe sharp pelvic pain, right radiating to suprapubic, with nausea and vomiting, onset while having otherwise normal bowel movement   She has h/o know right adnexal cyst that is being followed by her GYN with ultrasound, but has not previously experienced any complications with it   History provided by:  Patient  Abdominal Pain   Pain location:  RLQ and suprapubic (pelvic)  Pain quality: stabbing    Pain severity:  Severe  Onset quality:  Sudden  Timing:  Constant  Chronicity:  New  Associated symptoms: nausea and vomiting    Associated symptoms: no chest pain, no chills, no cough, no diarrhea, no dysuria, no fever, no hematemesis, no hematochezia, no hematuria, no shortness of breath, no sore throat, no vaginal bleeding and no vaginal discharge    Vomiting:     Number of occurrences:  4 since onset    Severity:  Moderate      Prior to Admission Medications   Prescriptions Last Dose Informant Patient Reported? Taking? Ascorbic Acid (VITAMIN C) 500 MG CAPS   Yes No   Sig: Take by mouth   Calcium Carbonate-Vitamin D (CALCIUM PLUS VITAMIN D PO)  Self Yes No   Sig: Take by mouth   FLUoxetine (PROzac) 10 mg capsule 1/31/2020 at Unknown time  No Yes   Sig: Take 1 capsule (10 mg total) by mouth daily   Multiple Vitamins-Minerals (WOMENS MULTIVITAMIN) TABS  Self Yes No   Sig: Take by mouth   clonazePAM (KlonoPIN) 1 mg tablet   No No   Sig: Take 1 tablet (1 mg total) by mouth daily at bedtime   Patient taking differently: Take 1 mg by mouth daily at bedtime Patient only takes PRN a couple of times per week     norgestimate-ethinyl estradiol (ORTHO TRI-CYCLEN LO) 0 18/0 215/0 25 MG-25 MCG per tablet   No No   Sig: Take 1 tablet by mouth daily      Facility-Administered Medications: None       Past Medical History:   Diagnosis Date    Anemia     Arthritis     [facility]     Crohn's disease ([facility]     Endometriosis     GERD (gastroesophageal reflux disease)     Gestational diabetes mellitus     Malignant melanoma ([facility] ) 2008    on back, had wide excision    Orbital myositis     Right ovarian cyst     Thyroid disease        Past Surgical History:   Procedure Laterality Date     SECTION      DENTAL SURGERY      DIAGNOSTIC LAPAROSCOPY  2003    for endometriosis    DILATION AND CURETTAGE OF UTERUS      HERNIA REPAIR      KNEE SURGERY      SKIN LESION EXCISION      melanoma of back       Family History   Problem Relation Age of Onset    Diabetes Mother     Hypertension Mother     Anxiety disorder Family     Coronary artery disease Family     Depression Family     Stroke Family         stroke syndrome     I have reviewed and agree with the history as documented  Social History     Tobacco Use    Smoking status: Never Smoker    Smokeless tobacco: Never Used   Substance Use Topics    Alcohol use: Yes     Comment: Social use    Drug use: No        Review of Systems   Constitutional: Negative for appetite change, chills and fever  HENT: Negative for sore throat  Respiratory: Negative for cough, shortness of breath and wheezing  Cardiovascular: Negative for chest pain and palpitations  Gastrointestinal: Positive for abdominal pain, nausea and vomiting  Negative for diarrhea, hematemesis and hematochezia  Genitourinary: Negative for dysuria, hematuria, vaginal bleeding and vaginal discharge  Musculoskeletal: Negative for neck pain  Skin: Negative for rash  Neurological: Negative for dizziness, weakness and headaches  Psychiatric/Behavioral: Negative for suicidal ideas  All other systems reviewed and are negative        Physical Exam  Physical Exam Constitutional: She appears well-developed and well-nourished  She appears distressed (appears uncomfortable)  HENT:   Head: Normocephalic and atraumatic  Right Ear: External ear normal    Left Ear: External ear normal    Mouth/Throat: Oropharynx is clear and moist    Eyes: Pupils are equal, round, and reactive to light  EOM are normal    Neck: Normal range of motion  Cardiovascular: Regular rhythm  Pulmonary/Chest: Effort normal and breath sounds normal  No respiratory distress  Abdominal: There is tenderness in the right lower quadrant and suprapubic area  There is guarding  There is no rigidity and no rebound  Neurological: She is alert  Skin: Capillary refill takes less than 2 seconds  There is pallor  Psychiatric: She has a normal mood and affect  Her behavior is normal  Judgment and thought content normal    Nursing note and vitals reviewed        Vital Signs  ED Triage Vitals [01/31/20 1135]   Temperature Pulse Respirations Blood Pressure SpO2   97 7 °F (36 5 °C) 78 20 139/76 100 %      Temp Source Heart Rate Source Patient Position - Orthostatic VS BP Location FiO2 (%)   Oral Monitor Sitting Right arm --      Pain Score       9           Vitals:    01/31/20 1245 01/31/20 1432 01/31/20 1551 01/31/20 1640   BP: 128/83 119/75 111/63 113/71   Pulse: 78 101 101 101   Patient Position - Orthostatic VS:  Lying Lying Sitting         Visual Acuity      ED Medications  Medications   ketorolac (TORADOL) injection 15 mg (15 mg Intravenous Given 1/31/20 1251)   morphine (PF) 4 mg/mL injection 4 mg (4 mg Intravenous Given 1/31/20 1243)   ondansetron (ZOFRAN) injection 4 mg (4 mg Intravenous Given 1/31/20 1251)       Diagnostic Studies  Results Reviewed     Procedure Component Value Units Date/Time    Comprehensive metabolic panel [[id number]]  (Abnormal) Collected:  01/31/20 1252    Lab Status:  Final result Specimen:  Blood from Arm, Right Updated:  01/31/20 1319     Sodium 141 mmol/L      Potassium 3 9 mmol/L      Chloride 103 mmol/L      CO2 26 mmol/L      ANION GAP 12 mmol/L      BUN 11 mg/dL      Creatinine 0 74 mg/dL      Glucose 106 mg/dL      Calcium 9 7 mg/dL      AST 33 U/L      ALT 23 U/L      Alkaline Phosphatase 79 U/L      Total Protein 8 4 g/dL      Albumin 4 0 g/dL      Total Bilirubin 0 98 mg/dL      eGFR 102 ml/min/1 73sq m     Narrative:       National Kidney Disease Foundation guidelines for Chronic Kidney Disease (CKD):     Stage 1 with normal or high GFR (GFR > 90 mL/min/1 73 square meters)    Stage 2 Mild CKD (GFR = 60-89 mL/min/1 73 square meters)    Stage 3A Moderate CKD (GFR = 45-59 mL/min/1 73 square meters)    Stage 3B Moderate CKD (GFR = 30-44 mL/min/1 73 square meters)    Stage 4 Severe CKD (GFR = 15-29 mL/min/1 73 square meters)    Stage 5 End Stage CKD (GFR <15 mL/min/1 73 square meters)  Note: GFR calculation is accurate only with a steady state creatinine    CBC and differential [[id number]]  (Abnormal) Collected:  01/31/20 1252    Lab Status:  Final result Specimen:  Blood from Arm, Right Updated:  01/31/20 1304     WBC 11 84 Thousand/uL      RBC 4 84 Million/uL      Hemoglobin 14 0 g/dL      Hematocrit 44 0 %      MCV 91 fL      MCH 28 9 pg      MCHC 31 8 g/dL      RDW 13 2 %      MPV 9 8 fL      Platelets 146 Thousands/uL      nRBC 0 /100 WBCs      Neutrophils Relative 80 %      Immat GRANS % 0 %      Lymphocytes Relative 15 %      Monocytes Relative 4 %      Eosinophils Relative 1 %      Basophils Relative 0 %      Neutrophils Absolute 9 49 Thousands/µL      Immature Grans Absolute 0 05 Thousand/uL      Lymphocytes Absolute 1 73 Thousands/µL      Monocytes Absolute 0 42 Thousand/µL      Eosinophils Absolute 0 12 Thousand/µL      Basophils Absolute 0 03 Thousands/µL     POCT pregnancy, urine [[id number]]  (Normal) Resulted:  01/31/20 1204    Lab Status:  Final result Updated:  01/31/20 1205     EXT PREG TEST UR (Ref: Negative) negative     Control valid    Urine Macroscopic, POC [[id number]]  (Abnormal) Collected:  01/31/20 1157    Lab Status:  Final result Specimen:  Urine Updated:  01/31/20 1158     Color, UA Yellow     Clarity, UA Clear     pH, UA 5 5     Leukocytes, UA Negative     Nitrite, UA Negative     Protein, UA Negative mg/dl      Glucose, UA Negative mg/dl      Ketones, UA Trace mg/dl      Urobilinogen, UA 0 2 E U /dl      Bilirubin, UA Negative     Blood, UA Negative     Specific Gravity, UA >=1 030    Narrative:       CLINITEK RESULT                 US pelvis complete w transvaginal   Final Result by Carmen Rosales MD (01/31 1429)       Flow is demonstrated to the ovaries bilaterally  Right ovarian hemorrhagic cyst, overall similar in size since the prior examination  Small to moderate amount of hemorrhagic free fluid within the pelvis  Redemonstration of a complex cystic structure in the right adnexal region which appears tubular in nature, as described above  Findings are slightly more heterogeneous since the prior examination and is favored to be on the basis of degenerating blood    products within hydrosalpinx as correlated with prior ultrasounds  Heterogeneous appearance of the uterus is again noted with subendometrial cystic change which can be seen in setting of adenomyosis  Workstation performed: [id number]         US pelvis complete non OB    (Results Pending)              Procedures  Procedures         ED Course  ED Course as of Jan 31 1712 Fri Jan 31, 2020   1433 Previously seen right adnexal cyst with some hemorrhagic free fluid, no torsion   US pelvis complete w transvaginal   1452 Her pain is much better controlled, still says she is in pain, but not severe, tender is over suprapubic   Pain is c/w the findings on ultrasound, I do not suspect appendicitis at this time, based on sudden onset   I also went over findings with radiology, to confirm that all the findings are right sided, which Dr Naveen Eaton confirmed    He affirmed there is free fluid and the appearance is c/w rupturing change and that it is still a complex lesion that would benefit from MRI  I d/w Dr Jaquez Ba by text, who agreed to consult for admission, and asked that I page the resident        [id number] Dr Jose Antonio Potts saw her in the ED and discussed options with her, they decided to discharge home and continue outpatient  MDM      Disposition  Final diagnoses:   Hemorrhagic ovarian cyst - right adnexa     Time reflects when diagnosis was documented in both MDM as applicable and the Disposition within this note     Time User Action Codes Description Comment    1/31/2020  1:02 PM Jose Guadalupe Pepe [G27 [id number]] Right ovarian cyst     1/31/2020  1:02 PM Merjoseph Salnicky Add [N83 519] Ovary, torsion     1/31/2020  3:18 PM Geneva Lat Add [J21 [id number]] Hemorrhagic ovarian cyst     1/31/2020  3:18 PM Parish Lat Modify [J82 [id number]] Hemorrhagic ovarian cyst right adnexa      ED Disposition     ED Disposition Condition Date/Time Comment    Discharge Good Fri Jan 31, 2020  4:13 PM [address] [place] [address] discharge to home/self care              Follow-up Information     Follow up With Specialties Details Why Rosario Thapa MD Obstetrics and Gynecology Go to  For followup [facility]  [address]  [address] [facility]  [phone]            Discharge Medication List as of 1/31/2020  4:13 PM      CONTINUE these medications which have NOT CHANGED    Details   FLUoxetine (PROzac) 10 mg capsule Take 1 capsule (10 mg total) by mouth daily, Starting Mon 11/25/2019, Normal      Ascorbic Acid (VITAMIN C) 500 MG CAPS Take by mouth, Historical Med      Calcium Carbonate-Vitamin D (CALCIUM PLUS VITAMIN D PO) Take by mouth, Historical Med      clonazePAM (KlonoPIN) 1 mg tablet Take 1 tablet (1 mg total) by mouth daily at bedtime, Starting Thu 12/20/2018, Normal      Multiple Vitamins-Minerals (WOMENS MULTIVITAMIN) TABS Take by mouth, Historical Med      norgestimate-ethinyl estradiol (ORTHO TRI-CYCLEN LO) 0 18/0 215/0 25 MG-25 MCG per tablet Take 1 tablet by mouth daily, Starting Thu 1/30/2020, Normal           Outpatient Discharge Orders   US pelvis complete non OB   Standing Status: Future Standing Exp  Date: 01/31/24     CBC   Standing Status: Future Standing Exp   Date: 01/31/21       ED Provider  Electronically Signed by           Marisela Arriaga MD  01/31/20 4628

## 2020-02-04 ENCOUNTER — ANESTHESIA EVENT (OUTPATIENT)
Dept: GASTROENTEROLOGY | Facility: MEDICAL CENTER | Age: 40
End: 2020-02-04

## 2020-02-07 ENCOUNTER — TELEPHONE (OUTPATIENT)
Dept: OBGYN CLINIC | Facility: MEDICAL CENTER | Age: 40
End: 2020-02-07

## 2020-02-07 NOTE — TELEPHONE ENCOUNTER
----- Message from Keturah Stewart MD sent at 2/6/2020  2:43 PM EST -----  Orders for labs and imaging were placed by myself on the day of her evaluation; should have been given to her by ER on discharge  She can do CBC at her earliest convenience and [address] in 5-8wks  Both orders in epic  If she goes to a [facility] lab she doesn't need a physical slip for the lab draw, but if she needs to go to [facility] we can mail her a slip or she can pick it up in office  For US, she should call central scheduling to schedule appointment for this  Thanks!    ----- Message -----  From: Zulma Casas MA  Sent: 2/6/2020  11:54 AM EST  To: Keturah Stewart MD    Pt was seen in the ER and had an US pelvis  Her question is when should she schedule the next US order and she was never given an order for labs

## 2020-02-24 ENCOUNTER — OFFICE VISIT (OUTPATIENT)
Dept: FAMILY MEDICINE CLINIC | Facility: CLINIC | Age: 40
End: 2020-02-24
Payer: COMMERCIAL

## 2020-02-24 VITALS
WEIGHT: 163 LBS | SYSTOLIC BLOOD PRESSURE: 112 MMHG | DIASTOLIC BLOOD PRESSURE: 70 MMHG | TEMPERATURE: 98.1 F | HEIGHT: 67 IN | BODY MASS INDEX: 25.58 KG/M2

## 2020-02-24 DIAGNOSIS — F06.4 ANXIETY DISORDER DUE TO KNOWN PHYSIOLOGICAL CONDITION: ICD-10-CM

## 2020-02-24 DIAGNOSIS — F51.01 PRIMARY INSOMNIA: ICD-10-CM

## 2020-02-24 PROCEDURE — 3008F BODY MASS INDEX DOCD: CPT | Performed by: FAMILY MEDICINE

## 2020-02-24 PROCEDURE — 99213 OFFICE O/P EST LOW 20 MIN: CPT | Performed by: FAMILY MEDICINE

## 2020-02-24 PROCEDURE — 1036F TOBACCO NON-USER: CPT | Performed by: FAMILY MEDICINE

## 2020-02-24 RX ORDER — FLUOXETINE 10 MG/1
10 CAPSULE ORAL DAILY
Qty: 90 CAPSULE | Refills: 1 | Status: SHIPPED | OUTPATIENT
Start: 2020-02-24 | End: 2020-09-30 | Stop reason: ALTCHOICE

## 2020-02-24 RX ORDER — CLONAZEPAM 1 MG/1
1 TABLET ORAL
Qty: 90 TABLET | Refills: 0 | Status: SHIPPED | OUTPATIENT
Start: 2020-02-24 | End: 2020-06-15 | Stop reason: SDUPTHER

## 2020-02-25 NOTE — PROGRESS NOTES
Assessment/Plan:  Patient is to continue with family counseling  Patient will continue with Prozac and clonazepam  Follow-up 3-4 months  Diagnoses and all orders for this visit:    Primary insomnia  -     clonazePAM (KlonoPIN) 1 mg tablet; Take 1 tablet (1 mg total) by mouth daily at bedtime    Anxiety disorder due to known physiological condition  -     FLUoxetine (PROzac) 10 mg capsule; Take 1 capsule (10 mg total) by mouth daily            Subjective:        Patient ID: Saturnino Guerrero is a 35 y o  female  Patient is here follow-up on anxiety  Patient status post ovarian cyst rupture January 31st    Patient did have pain for roughly 2 weeks which is now resolved  Patient did see OB Gyne  Anxiety is not significantly improved  The following portions of the patient's history were reviewed and updated as appropriate: allergies, current medications, past family history, past medical history, past social history, past surgical history and problem list       Review of Systems   Constitutional: Negative  HENT: Negative  Eyes: Negative  Respiratory: Negative  Cardiovascular: Negative  Gastrointestinal: Negative  Endocrine: Negative  Genitourinary: Negative  Musculoskeletal: Negative  Skin: Negative  Allergic/Immunologic: Negative  Neurological: Negative  Hematological: Negative  Psychiatric/Behavioral: Negative  Objective:               /70 (BP Location: Right arm, Patient Position: Sitting, Cuff Size: Adult)   Temp 98 1 °F (36 7 °C) (Tympanic)   Ht 5' 6 5" (1 689 m)   Wt 73 9 kg (163 lb)   LMP 02/07/2020 (Exact Date)   BMI 25 91 kg/m²          Physical Exam   Constitutional: She appears well-developed and well-nourished  Cardiovascular: Normal rate, regular rhythm and normal heart sounds  Pulmonary/Chest: Effort normal and breath sounds normal    Psychiatric: She has a normal mood and affect   Her behavior is normal  Judgment and thought content normal    Nursing note and vitals reviewed

## 2020-03-10 ENCOUNTER — ANESTHESIA (OUTPATIENT)
Dept: GASTROENTEROLOGY | Facility: MEDICAL CENTER | Age: 40
End: 2020-03-10

## 2020-03-10 ENCOUNTER — HOSPITAL ENCOUNTER (OUTPATIENT)
Dept: GASTROENTEROLOGY | Facility: MEDICAL CENTER | Age: 40
Setting detail: OUTPATIENT SURGERY
Discharge: HOME | End: 2020-03-10
Payer: COMMERCIAL

## 2020-03-10 VITALS
OXYGEN SATURATION: 99 % | DIASTOLIC BLOOD PRESSURE: 70 MMHG | HEIGHT: 67 IN | HEART RATE: 88 BPM | WEIGHT: 165.75 LBS | BODY MASS INDEX: 26.01 KG/M2 | RESPIRATION RATE: 18 BRPM | SYSTOLIC BLOOD PRESSURE: 109 MMHG | TEMPERATURE: 97.5 F

## 2020-03-10 DIAGNOSIS — K50.80 CROHN'S DISEASE OF BOTH SMALL AND LARGE INTESTINE WITHOUT COMPLICATION (HCC): ICD-10-CM

## 2020-03-10 LAB
EXT PREGNANCY TEST URINE: NEGATIVE
EXT. CONTROL: NORMAL

## 2020-03-10 PROCEDURE — 88305 TISSUE EXAM BY PATHOLOGIST: CPT | Performed by: PATHOLOGY

## 2020-03-10 PROCEDURE — 45380 COLONOSCOPY AND BIOPSY: CPT | Performed by: INTERNAL MEDICINE

## 2020-03-10 PROCEDURE — 81025 URINE PREGNANCY TEST: CPT | Performed by: ANESTHESIOLOGY

## 2020-03-10 RX ORDER — SODIUM CHLORIDE 9 MG/ML
125 INJECTION, SOLUTION INTRAVENOUS CONTINUOUS
Status: DISCONTINUED | OUTPATIENT
Start: 2020-03-10 | End: 2020-03-14 | Stop reason: HOSPADM

## 2020-03-10 RX ORDER — MESALAMINE 1000 MG/1
1000 SUPPOSITORY RECTAL
Qty: 30 SUPPOSITORY | Refills: 0 | Status: SHIPPED | OUTPATIENT
Start: 2020-03-10 | End: 2020-06-15

## 2020-03-10 RX ORDER — PROPOFOL 10 MG/ML
INJECTION, EMULSION INTRAVENOUS CONTINUOUS PRN
Status: DISCONTINUED | OUTPATIENT
Start: 2020-03-10 | End: 2020-03-10 | Stop reason: SURG

## 2020-03-10 RX ORDER — PROPOFOL 10 MG/ML
INJECTION, EMULSION INTRAVENOUS AS NEEDED
Status: DISCONTINUED | OUTPATIENT
Start: 2020-03-10 | End: 2020-03-10 | Stop reason: SURG

## 2020-03-10 RX ADMIN — PROPOFOL 100 MG: 10 INJECTION, EMULSION INTRAVENOUS at 08:50

## 2020-03-10 RX ADMIN — PROPOFOL 40 MG: 10 INJECTION, EMULSION INTRAVENOUS at 08:51

## 2020-03-10 RX ADMIN — PROPOFOL 120 MCG/KG/MIN: 10 INJECTION, EMULSION INTRAVENOUS at 08:48

## 2020-03-10 RX ADMIN — SODIUM CHLORIDE 125 ML/HR: 0.9 INJECTION, SOLUTION INTRAVENOUS at 08:41

## 2020-03-10 NOTE — DISCHARGE INSTRUCTIONS
Colonoscopy   WHAT YOU NEED TO KNOW:   A colonoscopy is a procedure to examine the inside of your colon (intestine) with a scope  Polyps or tissue growths may have been removed during your colonoscopy  It is normal to feel bloated and to have some abdominal discomfort  You should be passing gas  If you have hemorrhoids or you had polyps removed, you may have a small amount of bleeding  DISCHARGE INSTRUCTIONS:   Seek care immediately if:   · You have a large amount of bright red blood in your bowel movements  · Your abdomen is hard and firm and you have severe pain  · You have sudden trouble breathing  Contact your healthcare provider if:   · You develop a rash or hives  · You have a fever within 24 hours of your procedure  · You have not had a bowel movement for 3 days after your procedure  · You have questions or concerns about your condition or care  Activity:   · Do not lift, strain, or run  for 3 days after your procedure  · Rest after your procedure  You have been given medicine to relax you  Do not  drive or make important decisions until the day after your procedure  Return to your normal activity as directed  · Relieve gas and discomfort from bloating  by lying on your right side with a heating pad on your abdomen  You may need to take short walks to help the gas move out  Eat small meals until bloating is relieved  If you had polyps removed: For 7 days after your procedure:  · Do not  take aspirin  · Do not  go on long car rides  Help prevent constipation:   · Eat a variety of healthy foods  Healthy foods include fruit, vegetables, whole-grain breads, low-fat dairy products, beans, lean meat, and fish  Ask if you need to be on a special diet  Your healthcare provider may recommend that you eat high-fiber foods such as cooked beans  Fiber helps you have regular bowel movements  · Drink liquids as directed    Adults should drink between 9 and 13 eight-ounce cups of liquid every day  Ask what amount is best for you  For most people, good liquids to drink are water, juice, and milk  · Exercise as directed  Talk to your healthcare provider about the best exercise plan for you  Exercise can help prevent constipation, decrease your blood pressure and improve your health  Follow up with your healthcare provider as directed:  Write down your questions so you remember to ask them during your visits  © 2017 [address] [employer] Information is for End User's use only and may not be sold, redistributed or otherwise used for commercial purposes  All illustrations and images included in CareNotes® are the copyrighted property of A D A M , Inc  or Simone Farias  The above information is an  only  It is not intended as medical advice for individual conditions or treatments  Talk to your doctor, nurse or pharmacist before following any medical regimen to see if it is safe and effective for you  Colorectal Polyps   WHAT YOU NEED TO KNOW:   What are colorectal polyps? Colorectal polyps are small growths of tissue in the lining of the colon and rectum  Most polyps are hyperplastic polyps and are usually benign (noncancerous)  Certain types of polyps, called adenomatous polyps, may turn into cancer  What increases my risk of colorectal polyps? The exact cause of colorectal polyps is unknown  The following may increase your risk:  · Older age    · A diet of foods high in fat and low in fiber     · Family history of polyps    · Intestinal diseases, such as Crohn's disease or ulcerative colitis    · An unhealthy lifestyle, such as physical inactivity, smoking, or drinking alcohol    · Obesity  What are the signs and symptoms of colorectal polyps? · Blood in your bowel movement or bleeding from the rectum    · Change in bowel movement habits, such as diarrhea and constipation    · Abdominal pain  How are colorectal polyps diagnosed?   You should have fecal blood screening once a year for colorectal disease if you are over 48years old  You should be screened earlier if you have an intestinal disease or a family history of polyps or colorectal cancer  During this screening, a sample of your bowel movement is checked for blood, which may be an early sign of colorectal polyps or cancer  You may also need any of the following tests:  · Digital rectal exam:  Your healthcare provider will examine your anus and use a finger to check your rectum for polyps  · Barium enema: A barium enema is an x-ray of the colon  A tube is put into your anus, and a liquid called barium is put through the tube  Barium is used so that caregivers can see your colon better on the x-ray film  · Virtual colonoscopy: This is a CT scan that takes pictures of the inside of your colon and rectum  A small, flexible tube is put into your rectum and air or carbon dioxide (gas) is used to expand your colon  This lets healthcare providers clearly see your colon and any polyps on a monitor  · Colonoscopy or sigmoidoscopy: These procedures help your healthcare provider see the inside of your colon using a flexible tube with a small light and camera on the end  During a sigmoidoscopy, your healthcare provider will only look at rectum and lower colon  During a colonoscopy, healthcare providers will look at the full length of your colon  Healthcare providers may remove a small amount of tissue from the colon for a biopsy  How are colorectal polyps treated? · Polyp removal:  Polyps may be removed during your sigmoidoscopy or colonoscopy  · Polypectomy: This is surgery to remove your polyps  You may need laparoscopic or open surgery, depending on the type, size, and number of polyps that you have  Laparoscopy is done by inserting a small, flexible scope into incisions made on your abdomen  Open surgery is done by making a larger incision on your abdomen     What are the risks of colorectal polyps? You may bleed during a colonoscopy procedure  Your bowel may be perforated (torn) when polyps are removed  This may lead to an open abdominal surgery  During surgery, you may bleed too much or get an infection  Adenomatous polyps that are not removed may turn into cancer and become more difficult to treat  Where can I find support and more information? · [facility] (Levine, Susan. \[facility].\"")  [address] , [place] [address]  Phone: 4- 000 - [phone]  Web Address: Suzette Golden  [facility] gov  When should I contact my healthcare provider? · You have a fever  · You have chills, a cough, or feel weak and achy  · You have abdominal pain that does not go away or gets worse after you take medicine  · Your abdomen is swollen  · You are losing weight without trying  · You have questions or concerns about your condition or care  When should I seek immediate care or call 911? · You have sudden shortness of breath  · You have a fast heart rate, fast breathing, or are too dizzy to stand up  · You have severe abdominal pain  · You see blood in your bowel movement  CARE AGREEMENT:   You have the right to help plan your care  Learn about your health condition and how it may be treated  Discuss treatment options with your caregivers to decide what care you want to receive  You always have the right to refuse treatment  The above information is an  only  It is not intended as medical advice for individual conditions or treatments  Talk to your doctor, nurse or pharmacist before following any medical regimen to see if it is safe and effective for you  © 2017 [address] Information is for End User's use only and may not be sold, redistributed or otherwise used for commercial purposes   All illustrations and images included in CareNotes® are the copyrighted property of [employer]  or [employer]

## 2020-03-10 NOTE — H&P
H&P EXAM - Outpatient Endoscopy   Randa Lomeli 35 y o  female MRN: [id number]    [address] ENDOSCOPY   Encounter: [id number]        History and Physical -  [facility]  Randa Lomeli 35 y o  female MRN: [id number]                  HPI: Randa Lomeli is a 35y o  year old female who presents for colonoscopy      REVIEW OF SYSTEMS: Per the HPI, and otherwise unremarkable  Historical Information   Past Medical History:   Diagnosis Date    Anemia     Arthritis     [facility] necrosis [facility] )     [facility]     Endometriosis     GERD (gastroesophageal reflux disease)     Gestational diabetes mellitus     Malignant melanoma ([facility] [facility] ) 2008    on back, had wide excision    Orbital myositis     Right ovarian cyst     Thyroid disease      Past Surgical History:   Procedure Laterality Date     SECTION      DENTAL SURGERY      DIAGNOSTIC LAPAROSCOPY  2003    for endometriosis    DILATION AND CURETTAGE OF UTERUS      HERNIA REPAIR      KNEE SURGERY      SKIN LESION EXCISION  2008    melanoma of back     Social History   Social History     Substance and Sexual Activity   Alcohol Use Yes    Comment: Social use     Social History     Substance and Sexual Activity   Drug Use No     Social History     Tobacco Use   Smoking Status Never Smoker   Smokeless Tobacco Never Used     Family History   Problem Relation Age of Onset    Diabetes Mother     Hypertension Mother     Anxiety disorder Family     Coronary artery disease Family     Depression Family     Stroke Family         stroke syndrome       Meds/Allergies       (Not in a hospital admission)    Allergies   Allergen Reactions    Other Anaphylaxis     [facility] - [id number]: [place] bug spray    Tape  [Medical Tape]     Latex Rash       Objective     There were no vitals taken for this visit        PHYSICAL EXAM    Gen: NAD  CV: RRR  CHEST: Clear  ABD: soft, NT/ND  EXT: no edema      ASSESSMENT/PLAN:  This is a 35y o  year old female here for colonoscopy, and she is stable and optimized for her procedure

## 2020-03-10 NOTE — ANESTHESIA PREPROCEDURE EVALUATION
Review of Systems/Medical History  Patient summary reviewed  Chart reviewed  No history of anesthetic complications     Cardiovascular  Negative cardio ROS    Pulmonary  Negative pulmonary ROS        GI/Hepatic    GERD ,   Comment: Crohn s          Endo/Other  History of thyroid disease ,      GYN      Comment: Prev c  Section      Hematology  Anemia ,     Musculoskeletal    Comment: H/o avascular necrosis  Arthritis     Neurology   Psychology           Physical Exam    Airway    Mallampati score: II  TM Distance: >3 FB  Neck ROM: full     Dental   No notable dental hx     Cardiovascular  Comment: Negative ROS, Rhythm: regular, Rate: normal, Cardiovascular exam normal    Pulmonary  Pulmonary exam normal Breath sounds clear to auscultation,     Other Findings        Anesthesia Plan  ASA Score- 2     Anesthesia Type- IV sedation with anesthesia with ASA Monitors  Additional Monitors:   Airway Plan:         Plan Factors-    Induction-     Postoperative Plan-     Informed Consent- Anesthetic plan and risks discussed with patient

## 2020-03-13 ENCOUNTER — APPOINTMENT (OUTPATIENT)
Dept: LAB | Facility: MEDICAL CENTER | Age: 40
End: 2020-03-13
Payer: COMMERCIAL

## 2020-03-13 ENCOUNTER — TRANSCRIBE ORDERS (OUTPATIENT)
Dept: ADMINISTRATIVE | Facility: HOSPITAL | Age: 40
End: 2020-03-13

## 2020-03-13 ENCOUNTER — HOSPITAL ENCOUNTER (OUTPATIENT)
Dept: ULTRASOUND IMAGING | Facility: MEDICAL CENTER | Age: 40
Discharge: HOME | End: 2020-03-13
Payer: COMMERCIAL

## 2020-03-13 DIAGNOSIS — Z00.8 HEALTH EXAMINATION IN POPULATION SURVEY: ICD-10-CM

## 2020-03-13 DIAGNOSIS — N83.209 HEMORRHAGIC OVARIAN CYST: ICD-10-CM

## 2020-03-13 DIAGNOSIS — Z00.8 HEALTH EXAMINATION IN POPULATION SURVEY: Primary | ICD-10-CM

## 2020-03-13 DIAGNOSIS — K50.80 CROHN'S DISEASE OF BOTH SMALL AND LARGE INTESTINE WITHOUT COMPLICATION (HCC): ICD-10-CM

## 2020-03-13 LAB
ALBUMIN SERPL BCP-MCNC: 3.6 G/DL (ref 3.5–5)
ALP SERPL-CCNC: 96 U/L (ref 46–116)
ALT SERPL W P-5'-P-CCNC: 21 U/L (ref 12–78)
ANION GAP SERPL CALCULATED.3IONS-SCNC: 6 MMOL/L (ref 4–13)
AST SERPL W P-5'-P-CCNC: 17 U/L (ref 5–45)
BASOPHILS # BLD AUTO: 0.03 THOUSANDS/ΜL (ref 0–0.1)
BASOPHILS NFR BLD AUTO: 0 % (ref 0–1)
BILIRUB SERPL-MCNC: 0.53 MG/DL (ref 0.2–1)
BUN SERPL-MCNC: 7 MG/DL (ref 5–25)
CALCIUM SERPL-MCNC: 9.2 MG/DL (ref 8.3–10.1)
CHLORIDE SERPL-SCNC: 106 MMOL/L (ref 100–108)
CHOLEST SERPL-MCNC: 184 MG/DL (ref 50–200)
CO2 SERPL-SCNC: 28 MMOL/L (ref 21–32)
CREAT SERPL-MCNC: 0.68 MG/DL (ref 0.6–1.3)
EOSINOPHIL # BLD AUTO: 0.16 THOUSAND/ΜL (ref 0–0.61)
EOSINOPHIL NFR BLD AUTO: 2 % (ref 0–6)
ERYTHROCYTE [DISTWIDTH] IN BLOOD BY AUTOMATED COUNT: 13.4 % (ref 11.6–15.1)
EST. AVERAGE GLUCOSE BLD GHB EST-MCNC: 105 MG/DL
GFR SERPL CREATININE-BSD FRML MDRD: 110 ML/MIN/1.73SQ M
GLUCOSE SERPL-MCNC: 60 MG/DL (ref 65–140)
HBA1C MFR BLD: 5.3 %
HBV CORE AB SER QL: NORMAL
HBV CORE IGM SER QL: NORMAL
HBV SURFACE AB SER-ACNC: <3.1 MIU/ML
HBV SURFACE AG SER QL: NORMAL
HCT VFR BLD AUTO: 38.6 % (ref 34.8–46.1)
HDLC SERPL-MCNC: 92 MG/DL
HGB BLD-MCNC: 12.1 G/DL (ref 11.5–15.4)
IMM GRANULOCYTES # BLD AUTO: 0.02 THOUSAND/UL (ref 0–0.2)
IMM GRANULOCYTES NFR BLD AUTO: 0 % (ref 0–2)
LDLC SERPL CALC-MCNC: 73 MG/DL (ref 0–100)
LYMPHOCYTES # BLD AUTO: 2.15 THOUSANDS/ΜL (ref 0.6–4.47)
LYMPHOCYTES NFR BLD AUTO: 32 % (ref 14–44)
MCH RBC QN AUTO: 28.4 PG (ref 26.8–34.3)
MCHC RBC AUTO-ENTMCNC: 31.3 G/DL (ref 31.4–37.4)
MCV RBC AUTO: 91 FL (ref 82–98)
MONOCYTES # BLD AUTO: 0.43 THOUSAND/ΜL (ref 0.17–1.22)
MONOCYTES NFR BLD AUTO: 6 % (ref 4–12)
NEUTROPHILS # BLD AUTO: 3.96 THOUSANDS/ΜL (ref 1.85–7.62)
NEUTS SEG NFR BLD AUTO: 60 % (ref 43–75)
NONHDLC SERPL-MCNC: 92 MG/DL
NRBC BLD AUTO-RTO: 0 /100 WBCS
PLATELET # BLD AUTO: 312 THOUSANDS/UL (ref 149–390)
PMV BLD AUTO: 9.8 FL (ref 8.9–12.7)
POTASSIUM SERPL-SCNC: 3.7 MMOL/L (ref 3.5–5.3)
PROT SERPL-MCNC: 7.7 G/DL (ref 6.4–8.2)
RBC # BLD AUTO: 4.26 MILLION/UL (ref 3.81–5.12)
SODIUM SERPL-SCNC: 140 MMOL/L (ref 136–145)
TRIGL SERPL-MCNC: 94 MG/DL
WBC # BLD AUTO: 6.75 THOUSAND/UL (ref 4.31–10.16)

## 2020-03-13 PROCEDURE — 87340 HEPATITIS B SURFACE AG IA: CPT

## 2020-03-13 PROCEDURE — 86480 TB TEST CELL IMMUN MEASURE: CPT

## 2020-03-13 PROCEDURE — 80053 COMPREHEN METABOLIC PANEL: CPT

## 2020-03-13 PROCEDURE — 86706 HEP B SURFACE ANTIBODY: CPT

## 2020-03-13 PROCEDURE — 36415 COLL VENOUS BLD VENIPUNCTURE: CPT

## 2020-03-13 PROCEDURE — 80061 LIPID PANEL: CPT

## 2020-03-13 PROCEDURE — 82542 COL CHROMOTOGRAPHY QUAL/QUAN: CPT

## 2020-03-13 PROCEDURE — 76830 TRANSVAGINAL US NON-OB: CPT

## 2020-03-13 PROCEDURE — 83036 HEMOGLOBIN GLYCOSYLATED A1C: CPT

## 2020-03-13 PROCEDURE — 86704 HEP B CORE ANTIBODY TOTAL: CPT

## 2020-03-13 PROCEDURE — 86705 HEP B CORE ANTIBODY IGM: CPT

## 2020-03-13 PROCEDURE — 85025 COMPLETE CBC W/AUTO DIFF WBC: CPT

## 2020-03-13 PROCEDURE — 76856 US EXAM PELVIC COMPLETE: CPT

## 2020-03-16 LAB
GAMMA INTERFERON BACKGROUND BLD IA-ACNC: 0.04 IU/ML
M TB IFN-G BLD-IMP: NEGATIVE
M TB IFN-G CD4+ BCKGRND COR BLD-ACNC: 0 IU/ML
M TB IFN-G CD4+ BCKGRND COR BLD-ACNC: 0.01 IU/ML
MITOGEN IGNF BCKGRD COR BLD-ACNC: >10 IU/ML

## 2020-03-16 NOTE — RESULT ENCOUNTER NOTE
I called patient with the results  Biopsies show mild colitis related to Crohn's disease   She is scheduled for office follow up with me at the end of the month    She should repeat colonoscopy in 1 year

## 2020-03-19 LAB
REF LAB TEST METHOD: NORMAL
TEST INTERPRETATION: NORMAL
TPMT RBC-CCNC: 14.3 UNITS/ML RBC

## 2020-03-31 ENCOUNTER — TELEMEDICINE (OUTPATIENT)
Dept: GASTROENTEROLOGY | Facility: MEDICAL CENTER | Age: 40
End: 2020-03-31
Payer: COMMERCIAL

## 2020-03-31 DIAGNOSIS — K50.80 CROHN'S DISEASE OF BOTH SMALL AND LARGE INTESTINE WITHOUT COMPLICATION (HCC): Primary | ICD-10-CM

## 2020-03-31 PROCEDURE — 99213 OFFICE O/P EST LOW 20 MIN: CPT | Performed by: INTERNAL MEDICINE

## 2020-03-31 RX ORDER — BUDESONIDE 3 MG/1
9 CAPSULE, COATED PELLETS ORAL DAILY
Qty: 115 CAPSULE | Refills: 0 | Status: SHIPPED | OUTPATIENT
Start: 2020-03-31 | End: 2020-04-18

## 2020-03-31 NOTE — PROGRESS NOTES
Virtual Regular Visit    Problem List Items Addressed This Visit        Digestive    Crohn's disease ([facility] [address] ) - Primary    Relevant Medications    budesonide (ENTOCORT EC) 3 MG capsule        1  Crohn's disease of both small and large intestine without complication (HCC)  Crohn's disease was diagnosed at age 15  Prior treatments included high-dose steroids and TPN initially  She has previously treated with 5 ASA agents Pentasa and Asacol which were discontinued GI upset  She was then transitioned to Imuran, dose unknown, for 6-8 yearsand this medication was discontinued due to a diagnosis of melanoma  She has been off all medications for the last 12 years  3/10 colonoscopy with 6mm apthous ulcer in TI, mild to moderate proctitis, prolapsing skin tag at anal verge  Path with focal active cryptitis and mild chronic active colitis at 10cm and 5cm from anal verge  She was started on canasa suppository nightly now with resolution of her mucous and blood streaked stools  She currently reports mild abdominal pain, fatigue and L sided SI joint discomfort which she has had with her Crohn's flare in the past  We discussed options of treatment for her active disease given the TI ulceration seen on recent colonoscopy  She is unable to tolerate Pentasa due to GI upset  She has history of melanona and would prefer to avoid Imuran  I recommended a trial and taper of budesonide to see if this can induce remission  She does not require a biologic treatment at this time, but may in the future  Prelminary testing for hepatitis B and quant gold has been negative  She will start budesonide 9mg x 4 weeks then 6mg x 4 weeks, then 3mg x 4 weeks then off  Given her night sweats and fatigue, I asked her to take her temperature at home and if having fevers or persistent symptoms to call my office for COVID-19 testing   She does report a recent exposure to her grandmother's house who was recently in the hospital     - budesonide (ENTOCORT EC) 3 MG capsule; Take 3 capsules (9 mg total) by mouth daily Take 9mg (3 pills) daily for 4 weeks  Then take 6mg (2 pills) daily for 4 weeks  Then take 3mg (1 pill daily) for 4 weeks then stop  Dispense: 115 capsule; Refill: 0    - Continue treatment with casasa suppository, decrease to three times weekly  Start budeosonide taper  - Your next colonoscopy is due 2021 or 2022 pending her clinical course  - DEXA scan ordered at last visit, not yet completed    Health maintenance:  - Yearly flu shot  Avoid live vaccines  - Pneumovax and Prevnar 13 every 5 years  - Routine pap smear per gyn  - Yearly skin exam per optho    Virtual office in 2 -3 weeks           Reason for visit is Crohn's disease    Encounter provider Sukumar Dean MD    Provider located at [address]      Recent Visits  No visits were found meeting these conditions  Showing recent visits within past 7 days and meeting all other requirements     Today's Visits  Date Type Provider Dept   03/31/20 Telemedicine Sukumar Dean MD On license of [facility] today's visits and meeting all other requirements     Future Appointments  No visits were found meeting these conditions  Showing future appointments within next 150 days and meeting all other requirements        The patient was identified by name and date of birth  Marilyn Salinas was informed that this is a telemedicine visit and that the visit is being conducted through [employer]  My office door was closed  No one else was in the room  She acknowledged consent and understanding of privacy and security of the video platform  The patient has agreed to participate and understands they can discontinue the visit at any time  Patient is aware this is a billable service       Subjective  Marilyn Salinas is a 35 y o  female  Crohn's disease of the small and large intestine, diagnosed 23 years ago currently off while medication for 12 years,    Interval history: She underwent 3/10 colonoscopy with 6mm apthous ulcer in TI, mild to moderate proctitis, prolapsing skin tag at anal verge  Pathology showed with focal active cryptitis and mild chronic active colitis at 10cm and 5cm from anal verge  Otherwise unremarkable  Given mucosa and blood in stool was started on canasa suppositories  She has been using these nightly for the last several weeks and now has resolution of the mucous and blood in her stool  She reports nightsweats last night  She denies fevers or chills  She reports fatigue  She also reports L sided SI joint discomfort that she would have with her Crohn's flare in the past  She notes some shaan-umbilical abdominal pain which occurs after eating, particularly heavy and fatty meals  Bowel movements are 3-4 per day and normal and formed  She is following up with GYN in early April for history of ruptured right ovarian cyst      IBD history:  She states she was diagnosed with Crohn's disease at age 15 after presenting with abdominal pain and bloody bowel movements  She had previously had GI issues several years prior to this  She reports undergoing a colonoscopy he was diagnosed with inflammatory bowel disease of the small large intestine  She was initially on high-dose steroids for several years and TPN for bowel rest   She was ultimately transition to 5 ASA agents including Pentasa (16 pills per day _and Asacol  However these were discontinued due to nausea  She was ultimately placed on Imuran, the dose of which she cannot recall  She remain on Imuran to her age 24s up until the time that she was trying to get pregnant and discontinue the medication  She resumed the medication after her pregnancy and at age 28 was diagnosed with melanoma  Afterwards the Imuran was discontinued and she has been off all medications since    She does note being diagnosed with avascular necrosis given her long use of steroids early on in her disease course  She has no history of fistula or perianal disease     Prior treatments  5ASA: Pentasa, Asacol initially at diagnosis  Stopped due to nausea and GI upset  Steroids: yes  None for 10 years  Imuran: yes, on for 6-8 years  Stopped due to history of melanoma  Biologics: none prior      Extra intestinal manifestations: At the time of her diagnosis she presented also with erythema nodosum  In her early 26 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  For this she received high-dose of oral prednisone 80 mg daily  It has been 10 years since her last steroid use     She does note intermittent joint discomfort particularly her ankles     Past Medical History:   Diagnosis Date    Arthritis     Avascular necrosis (HCC)     right ankle, right hip, left elbow    [facility]'s [facility] ([facility] [address]     Endometriosis     GERD (gastroesophageal reflux disease)     Gestational diabetes mellitus     Gestational    Malignant melanoma ([facility] [address] ) 2008    on back, had wide excision    Orbital myositis     Bilateral    Right ovarian cyst 2020    Thyroid disease     nodules       Past Surgical History:   Procedure Laterality Date     SECTION      DENTAL SURGERY      DIAGNOSTIC LAPAROSCOPY  2003    for endometriosis    DILATION AND CURETTAGE OF UTERUS      HERNIA REPAIR  2007    KNEE SURGERY Right     torn meniscus    SKIN LESION EXCISION  2008    melanoma of back       Current Outpatient Medications   Medication Sig Dispense Refill    Ascorbic Acid (VITAMIN C) 500 MG CAPS Take by mouth      Calcium Carbonate-Vitamin D (CALCIUM PLUS VITAMIN D PO) Take by mouth      clonazePAM (KlonoPIN) 1 mg tablet Take 1 tablet (1 mg total) by mouth daily at bedtime 90 tablet 0    FLUoxetine (PROzac) 10 mg capsule Take 1 capsule (10 mg total) by mouth daily 90 capsule 1    ibuprofen (MOTRIN) 600 mg tablet Take 1 tablet (600 mg total) by mouth every 6 (six) hours as needed for mild pain (Patient not taking: Reported on 2/24/2020) 50 tablet 2    mesalamine (CANASA) 1,000 mg suppository Insert 1 suppository (1,000 mg total) into the rectum daily at bedtime 30 suppository 0    Multiple Vitamins-Minerals (WOMENS MULTIVITAMIN) TABS Take by mouth      norgestimate-ethinyl estradiol (ORTHO TRI-CYCLEN LO) 0 18/0 215/0 25 MG-25 MCG per tablet Take 1 tablet by mouth daily 28 tablet 6     No current facility-administered medications for this visit  Allergies   Allergen Reactions    Other Anaphylaxis     Annotation - [id number]: [place] bug spray    Tape  [Medical Tape]     Latex Rash       Review of Systems  REVIEW OF SYSTEMS:    CONSTITUTIONAL: Denies any fever, chills, rigors, and weight loss  HEENT: No earache or tinnitus  Denies hearing loss or visual disturbances  CARDIOVASCULAR: No chest pain or palpitations  RESPIRATORY: Denies any cough, hemoptysis, shortness of breath or dyspnea on exertion  GASTROINTESTINAL: As noted in the History of Present Illness  GENITOURINARY: No problems with urination  Denies any hematuria or dysuria  NEUROLOGIC: No dizziness or vertigo, denies headaches  MUSCULOSKELETAL: Denies any muscle or joint pain  SKIN: Denies skin rashes or itching  ENDOCRINE: Denies excessive thirst  Denies intolerance to heat or cold  PSYCHOSOCIAL: Denies depression or anxiety  Denies any recent memory loss  Physical Exam   PHYSICAL EXAMINATION:  Appearance and vitals taken from home devices    General Appearance:   Alert, cooperative, no distress   HEENT:  Normocephalic, atraumatic, anicteric  Neck supple, symmetrical, trachea midline  Lungs:   Equal chest rise and unlabored breathing, normal effort, no coughing  Skin:   No jaundice, rashes, or lesions  Musculoskeletal:   Normal range of motion visualized     Psych: Normal affect and normal insight  Neuro:  Alert and appropriate  I spent 36 minutes with the patient during this visit

## 2020-04-01 ENCOUNTER — TELEPHONE (OUTPATIENT)
Dept: GASTROENTEROLOGY | Facility: AMBULARY SURGERY CENTER | Age: 40
End: 2020-04-01

## 2020-04-01 DIAGNOSIS — K50.919 CROHN'S DISEASE WITH COMPLICATION, UNSPECIFIED GASTROINTESTINAL TRACT LOCATION (HCC): Primary | ICD-10-CM

## 2020-04-01 RX ORDER — BUDESONIDE 3 MG/1
CAPSULE, COATED PELLETS ORAL
Qty: 53 CAPSULE | Refills: 0 | Status: SHIPPED | OUTPATIENT
Start: 2020-04-01 | End: 2020-09-29

## 2020-04-18 DIAGNOSIS — K50.80 CROHN'S DISEASE OF BOTH SMALL AND LARGE INTESTINE WITHOUT COMPLICATION (HCC): ICD-10-CM

## 2020-04-18 RX ORDER — BUDESONIDE 3 MG/1
CAPSULE, COATED PELLETS ORAL
Qty: 84 CAPSULE | Refills: 1 | Status: SHIPPED | OUTPATIENT
Start: 2020-04-18 | End: 2020-04-30 | Stop reason: SDUPTHER

## 2020-04-21 ENCOUNTER — DOCUMENTATION (OUTPATIENT)
Dept: GASTROENTEROLOGY | Facility: MEDICAL CENTER | Age: 40
End: 2020-04-21

## 2020-04-21 ENCOUNTER — TELEMEDICINE (OUTPATIENT)
Dept: GASTROENTEROLOGY | Facility: MEDICAL CENTER | Age: 40
End: 2020-04-21
Payer: COMMERCIAL

## 2020-04-21 DIAGNOSIS — K50.80 CROHN'S DISEASE OF BOTH SMALL AND LARGE INTESTINE WITHOUT COMPLICATION (HCC): Primary | ICD-10-CM

## 2020-04-21 PROCEDURE — 99213 OFFICE O/P EST LOW 20 MIN: CPT | Performed by: INTERNAL MEDICINE

## 2020-04-27 ENCOUNTER — HOSPITAL ENCOUNTER (EMERGENCY)
Facility: HOSPITAL | Age: 40
Discharge: HOME | End: 2020-04-27
Attending: EMERGENCY MEDICINE | Admitting: EMERGENCY MEDICINE
Payer: COMMERCIAL

## 2020-04-27 ENCOUNTER — APPOINTMENT (EMERGENCY)
Dept: ULTRASOUND IMAGING | Facility: HOSPITAL | Age: 40
End: 2020-04-27
Payer: COMMERCIAL

## 2020-04-27 VITALS
DIASTOLIC BLOOD PRESSURE: 70 MMHG | RESPIRATION RATE: 18 BRPM | HEART RATE: 91 BPM | OXYGEN SATURATION: 99 % | TEMPERATURE: 98.4 F | SYSTOLIC BLOOD PRESSURE: 126 MMHG

## 2020-04-27 DIAGNOSIS — N83.209 HEMORRHAGIC OVARIAN CYST: Primary | ICD-10-CM

## 2020-04-27 LAB
ALBUMIN SERPL BCP-MCNC: 3.4 G/DL (ref 3.5–5)
ALP SERPL-CCNC: 73 U/L (ref 46–116)
ALT SERPL W P-5'-P-CCNC: 20 U/L (ref 12–78)
ANION GAP SERPL CALCULATED.3IONS-SCNC: 8 MMOL/L (ref 4–13)
AST SERPL W P-5'-P-CCNC: 24 U/L (ref 5–45)
BASOPHILS # BLD AUTO: 0.03 THOUSANDS/ΜL (ref 0–0.1)
BASOPHILS NFR BLD AUTO: 0 % (ref 0–1)
BILIRUB SERPL-MCNC: 0.52 MG/DL (ref 0.2–1)
BUN SERPL-MCNC: 12 MG/DL (ref 5–25)
CALCIUM SERPL-MCNC: 9.6 MG/DL (ref 8.3–10.1)
CHLORIDE SERPL-SCNC: 101 MMOL/L (ref 100–108)
CO2 SERPL-SCNC: 28 MMOL/L (ref 21–32)
CREAT SERPL-MCNC: 0.81 MG/DL (ref 0.6–1.3)
EOSINOPHIL # BLD AUTO: 0.04 THOUSAND/ΜL (ref 0–0.61)
EOSINOPHIL NFR BLD AUTO: 0 % (ref 0–6)
ERYTHROCYTE [DISTWIDTH] IN BLOOD BY AUTOMATED COUNT: 13.5 % (ref 11.6–15.1)
GFR SERPL CREATININE-BSD FRML MDRD: 91 ML/MIN/1.73SQ M
GLUCOSE SERPL-MCNC: 118 MG/DL (ref 65–140)
HCG SERPL QL: NEGATIVE
HCT VFR BLD AUTO: 42.7 % (ref 34.8–46.1)
HGB BLD-MCNC: 13.7 G/DL (ref 11.5–15.4)
IMM GRANULOCYTES # BLD AUTO: 0.08 THOUSAND/UL (ref 0–0.2)
IMM GRANULOCYTES NFR BLD AUTO: 1 % (ref 0–2)
LYMPHOCYTES # BLD AUTO: 1.57 THOUSANDS/ΜL (ref 0.6–4.47)
LYMPHOCYTES NFR BLD AUTO: 10 % (ref 14–44)
MCH RBC QN AUTO: 29.5 PG (ref 26.8–34.3)
MCHC RBC AUTO-ENTMCNC: 32.1 G/DL (ref 31.4–37.4)
MCV RBC AUTO: 92 FL (ref 82–98)
MONOCYTES # BLD AUTO: 0.66 THOUSAND/ΜL (ref 0.17–1.22)
MONOCYTES NFR BLD AUTO: 4 % (ref 4–12)
NEUTROPHILS # BLD AUTO: 13.48 THOUSANDS/ΜL (ref 1.85–7.62)
NEUTS SEG NFR BLD AUTO: 85 % (ref 43–75)
NRBC BLD AUTO-RTO: 0 /100 WBCS
PLATELET # BLD AUTO: 308 THOUSANDS/UL (ref 149–390)
PMV BLD AUTO: 9.5 FL (ref 8.9–12.7)
POTASSIUM SERPL-SCNC: 4.1 MMOL/L (ref 3.5–5.3)
PROT SERPL-MCNC: 8 G/DL (ref 6.4–8.2)
RBC # BLD AUTO: 4.64 MILLION/UL (ref 3.81–5.12)
SODIUM SERPL-SCNC: 137 MMOL/L (ref 136–145)
WBC # BLD AUTO: 15.86 THOUSAND/UL (ref 4.31–10.16)

## 2020-04-27 PROCEDURE — 99284 EMERGENCY DEPT VISIT MOD MDM: CPT

## 2020-04-27 PROCEDURE — 96375 TX/PRO/DX INJ NEW DRUG ADDON: CPT

## 2020-04-27 PROCEDURE — 80053 COMPREHEN METABOLIC PANEL: CPT | Performed by: PHYSICIAN ASSISTANT

## 2020-04-27 PROCEDURE — 96374 THER/PROPH/DIAG INJ IV PUSH: CPT

## 2020-04-27 PROCEDURE — 84703 CHORIONIC GONADOTROPIN ASSAY: CPT | Performed by: PHYSICIAN ASSISTANT

## 2020-04-27 PROCEDURE — 96361 HYDRATE IV INFUSION ADD-ON: CPT

## 2020-04-27 PROCEDURE — 85025 COMPLETE CBC W/AUTO DIFF WBC: CPT | Performed by: PHYSICIAN ASSISTANT

## 2020-04-27 PROCEDURE — 99285 EMERGENCY DEPT VISIT HI MDM: CPT | Performed by: PHYSICIAN ASSISTANT

## 2020-04-27 PROCEDURE — 36415 COLL VENOUS BLD VENIPUNCTURE: CPT | Performed by: PHYSICIAN ASSISTANT

## 2020-04-27 PROCEDURE — 76830 TRANSVAGINAL US NON-OB: CPT

## 2020-04-27 PROCEDURE — 76856 US EXAM PELVIC COMPLETE: CPT

## 2020-04-27 RX ORDER — MORPHINE SULFATE 4 MG/ML
4 INJECTION, SOLUTION INTRAMUSCULAR; INTRAVENOUS ONCE
Status: COMPLETED | OUTPATIENT
Start: 2020-04-27 | End: 2020-04-27

## 2020-04-27 RX ORDER — KETOROLAC TROMETHAMINE 30 MG/ML
15 INJECTION, SOLUTION INTRAMUSCULAR; INTRAVENOUS ONCE
Status: COMPLETED | OUTPATIENT
Start: 2020-04-27 | End: 2020-04-27

## 2020-04-27 RX ORDER — ONDANSETRON 2 MG/ML
4 INJECTION INTRAMUSCULAR; INTRAVENOUS ONCE
Status: COMPLETED | OUTPATIENT
Start: 2020-04-27 | End: 2020-04-27

## 2020-04-27 RX ORDER — OXYCODONE HYDROCHLORIDE 5 MG/1
5 TABLET ORAL EVERY 6 HOURS PRN
Qty: 10 TABLET | Refills: 0 | Status: SHIPPED | OUTPATIENT
Start: 2020-04-27 | End: 2020-04-30 | Stop reason: ALTCHOICE

## 2020-04-27 RX ORDER — ONDANSETRON 4 MG/1
4 TABLET, ORALLY DISINTEGRATING ORAL EVERY 6 HOURS PRN
Qty: 20 TABLET | Refills: 0 | Status: SHIPPED | OUTPATIENT
Start: 2020-04-27 | End: 2020-04-30 | Stop reason: ALTCHOICE

## 2020-04-27 RX ADMIN — MORPHINE SULFATE 4 MG: 4 INJECTION INTRAVENOUS at 16:58

## 2020-04-27 RX ADMIN — SODIUM CHLORIDE 1000 ML: 0.9 INJECTION, SOLUTION INTRAVENOUS at 16:39

## 2020-04-27 RX ADMIN — ONDANSETRON 4 MG: 2 INJECTION INTRAMUSCULAR; INTRAVENOUS at 16:39

## 2020-04-27 RX ADMIN — KETOROLAC TROMETHAMINE 15 MG: 30 INJECTION, SOLUTION INTRAMUSCULAR at 16:39

## 2020-04-30 ENCOUNTER — TELEMEDICINE (OUTPATIENT)
Dept: OBGYN CLINIC | Facility: MEDICAL CENTER | Age: 40
End: 2020-04-30
Payer: COMMERCIAL

## 2020-04-30 DIAGNOSIS — N80.9 ENDOMETRIOMA: Primary | ICD-10-CM

## 2020-04-30 DIAGNOSIS — Z30.2 REQUEST FOR STERILIZATION: ICD-10-CM

## 2020-04-30 PROCEDURE — 99214 OFFICE O/P EST MOD 30 MIN: CPT | Performed by: OBSTETRICS & GYNECOLOGY

## 2020-06-04 ENCOUNTER — OFFICE VISIT (OUTPATIENT)
Dept: OBGYN CLINIC | Facility: MEDICAL CENTER | Age: 40
End: 2020-06-04

## 2020-06-04 VITALS
DIASTOLIC BLOOD PRESSURE: 80 MMHG | BODY MASS INDEX: 25.9 KG/M2 | HEIGHT: 67 IN | WEIGHT: 165 LBS | SYSTOLIC BLOOD PRESSURE: 114 MMHG

## 2020-06-04 DIAGNOSIS — Z30.2 REQUEST FOR STERILIZATION: ICD-10-CM

## 2020-06-04 DIAGNOSIS — N83.201 HEMORRHAGIC CYST OF RIGHT OVARY: Primary | ICD-10-CM

## 2020-06-04 DIAGNOSIS — Z01.818 PREOP EXAMINATION: ICD-10-CM

## 2020-06-04 DIAGNOSIS — N80.9 ENDOMETRIOSIS: ICD-10-CM

## 2020-06-04 PROCEDURE — 3008F BODY MASS INDEX DOCD: CPT | Performed by: OBSTETRICS & GYNECOLOGY

## 2020-06-04 PROCEDURE — PREOP: Performed by: OBSTETRICS & GYNECOLOGY

## 2020-06-04 RX ORDER — SODIUM CHLORIDE 9 MG/ML
125 INJECTION, SOLUTION INTRAVENOUS CONTINUOUS
Status: CANCELLED | OUTPATIENT
Start: 2020-06-22

## 2020-06-15 ENCOUNTER — OFFICE VISIT (OUTPATIENT)
Dept: FAMILY MEDICINE CLINIC | Facility: CLINIC | Age: 40
End: 2020-06-15
Payer: COMMERCIAL

## 2020-06-15 VITALS
DIASTOLIC BLOOD PRESSURE: 70 MMHG | SYSTOLIC BLOOD PRESSURE: 116 MMHG | HEIGHT: 67 IN | TEMPERATURE: 98.9 F | BODY MASS INDEX: 26.06 KG/M2 | WEIGHT: 166 LBS

## 2020-06-15 DIAGNOSIS — N80.9 ENDOMETRIOSIS: ICD-10-CM

## 2020-06-15 DIAGNOSIS — G89.29 CHRONIC LEFT SI JOINT PAIN: ICD-10-CM

## 2020-06-15 DIAGNOSIS — K50.00 CROHN'S DISEASE OF SMALL INTESTINE WITHOUT COMPLICATION (HCC): Primary | ICD-10-CM

## 2020-06-15 DIAGNOSIS — F51.01 PRIMARY INSOMNIA: ICD-10-CM

## 2020-06-15 DIAGNOSIS — M53.3 CHRONIC LEFT SI JOINT PAIN: ICD-10-CM

## 2020-06-15 PROCEDURE — 3008F BODY MASS INDEX DOCD: CPT | Performed by: FAMILY MEDICINE

## 2020-06-15 PROCEDURE — 99213 OFFICE O/P EST LOW 20 MIN: CPT | Performed by: FAMILY MEDICINE

## 2020-06-15 PROCEDURE — 1036F TOBACCO NON-USER: CPT | Performed by: FAMILY MEDICINE

## 2020-06-15 RX ORDER — CLONAZEPAM 1 MG/1
1 TABLET ORAL
Qty: 90 TABLET | Refills: 1 | Status: SHIPPED | OUTPATIENT
Start: 2020-06-15 | End: 2020-12-03 | Stop reason: SDUPTHER

## 2020-06-17 DIAGNOSIS — Z01.818 PREOP EXAMINATION: ICD-10-CM

## 2020-06-17 PROCEDURE — U0003 INFECTIOUS AGENT DETECTION BY NUCLEIC ACID (DNA OR RNA); SEVERE ACUTE RESPIRATORY SYNDROME CORONAVIRUS 2 (SARS-COV-2) (CORONAVIRUS DISEASE [COVID-19]), AMPLIFIED PROBE TECHNIQUE, MAKING USE OF HIGH THROUGHPUT TECHNOLOGIES AS DESCRIBED BY CMS-2020-01-R: HCPCS

## 2020-06-18 LAB
INPATIENT: NORMAL
SARS-COV-2 RNA SPEC QL NAA+PROBE: NOT DETECTED

## 2020-06-22 ENCOUNTER — HOSPITAL ENCOUNTER (OUTPATIENT)
Facility: HOSPITAL | Age: 40
Setting detail: OUTPATIENT SURGERY
Discharge: HOME | End: 2020-06-22
Attending: OBSTETRICS & GYNECOLOGY | Admitting: OBSTETRICS & GYNECOLOGY
Payer: COMMERCIAL

## 2020-06-22 ENCOUNTER — ANESTHESIA (OUTPATIENT)
Dept: PERIOP | Facility: HOSPITAL | Age: 40
End: 2020-06-22
Payer: COMMERCIAL

## 2020-06-22 ENCOUNTER — ANESTHESIA EVENT (OUTPATIENT)
Dept: PERIOP | Facility: HOSPITAL | Age: 40
End: 2020-06-22
Payer: COMMERCIAL

## 2020-06-22 VITALS
OXYGEN SATURATION: 96 % | DIASTOLIC BLOOD PRESSURE: 64 MMHG | SYSTOLIC BLOOD PRESSURE: 118 MMHG | HEIGHT: 66 IN | BODY MASS INDEX: 26.52 KG/M2 | HEART RATE: 88 BPM | TEMPERATURE: 97.5 F | RESPIRATION RATE: 16 BRPM | WEIGHT: 165 LBS

## 2020-06-22 DIAGNOSIS — Z30.2 REQUEST FOR STERILIZATION: ICD-10-CM

## 2020-06-22 DIAGNOSIS — N80.9 ENDOMETRIOSIS: ICD-10-CM

## 2020-06-22 DIAGNOSIS — N83.201 HEMORRHAGIC CYST OF RIGHT OVARY: ICD-10-CM

## 2020-06-22 DIAGNOSIS — Z98.890 S/P LAPAROSCOPY: Primary | ICD-10-CM

## 2020-06-22 PROBLEM — Z90.79 STATUS POST BILATERAL SALPINGECTOMY: Status: ACTIVE | Noted: 2020-06-22

## 2020-06-22 PROBLEM — Z90.721 S/P RIGHT OOPHORECTOMY: Status: ACTIVE | Noted: 2020-06-22

## 2020-06-22 LAB
EXT PREGNANCY TEST URINE: NEGATIVE
EXT. CONTROL: NORMAL

## 2020-06-22 PROCEDURE — 81025 URINE PREGNANCY TEST: CPT | Performed by: OBSTETRICS & GYNECOLOGY

## 2020-06-22 PROCEDURE — 88305 TISSUE EXAM BY PATHOLOGIST: CPT | Performed by: PATHOLOGY

## 2020-06-22 PROCEDURE — 58662 LAPAROSCOPY EXCISE LESIONS: CPT | Performed by: OBSTETRICS & GYNECOLOGY

## 2020-06-22 PROCEDURE — 58661 LAPAROSCOPY REMOVE ADNEXA: CPT | Performed by: OBSTETRICS & GYNECOLOGY

## 2020-06-22 RX ORDER — MIDAZOLAM HYDROCHLORIDE 2 MG/2ML
INJECTION, SOLUTION INTRAMUSCULAR; INTRAVENOUS AS NEEDED
Status: DISCONTINUED | OUTPATIENT
Start: 2020-06-22 | End: 2020-06-22 | Stop reason: SURG

## 2020-06-22 RX ORDER — OXYCODONE HYDROCHLORIDE AND ACETAMINOPHEN 5; 325 MG/1; MG/1
1 TABLET ORAL EVERY 6 HOURS PRN
Qty: 10 TABLET | Refills: 0 | Status: SHIPPED | OUTPATIENT
Start: 2020-06-22 | End: 2020-06-24 | Stop reason: SDUPTHER

## 2020-06-22 RX ORDER — PROPOFOL 10 MG/ML
INJECTION, EMULSION INTRAVENOUS AS NEEDED
Status: DISCONTINUED | OUTPATIENT
Start: 2020-06-22 | End: 2020-06-22 | Stop reason: SURG

## 2020-06-22 RX ORDER — IBUPROFEN 600 MG/1
600 TABLET ORAL EVERY 6 HOURS PRN
Qty: 50 TABLET | Refills: 1 | Status: SHIPPED | OUTPATIENT
Start: 2020-06-22 | End: 2020-10-20

## 2020-06-22 RX ORDER — NEOSTIGMINE METHYLSULFATE 1 MG/ML
INJECTION INTRAVENOUS AS NEEDED
Status: DISCONTINUED | OUTPATIENT
Start: 2020-06-22 | End: 2020-06-22 | Stop reason: SURG

## 2020-06-22 RX ORDER — ROCURONIUM BROMIDE 10 MG/ML
INJECTION, SOLUTION INTRAVENOUS AS NEEDED
Status: DISCONTINUED | OUTPATIENT
Start: 2020-06-22 | End: 2020-06-22 | Stop reason: SURG

## 2020-06-22 RX ORDER — FENTANYL CITRATE/PF 50 MCG/ML
50 SYRINGE (ML) INJECTION
Status: COMPLETED | OUTPATIENT
Start: 2020-06-22 | End: 2020-06-22

## 2020-06-22 RX ORDER — BUPIVACAINE HYDROCHLORIDE 2.5 MG/ML
INJECTION, SOLUTION EPIDURAL; INFILTRATION; INTRACAUDAL AS NEEDED
Status: DISCONTINUED | OUTPATIENT
Start: 2020-06-22 | End: 2020-06-22 | Stop reason: HOSPADM

## 2020-06-22 RX ORDER — KETOROLAC TROMETHAMINE 30 MG/ML
INJECTION, SOLUTION INTRAMUSCULAR; INTRAVENOUS AS NEEDED
Status: DISCONTINUED | OUTPATIENT
Start: 2020-06-22 | End: 2020-06-22 | Stop reason: SURG

## 2020-06-22 RX ORDER — OXYCODONE HYDROCHLORIDE 5 MG/1
5 TABLET ORAL EVERY 4 HOURS PRN
Status: DISCONTINUED | OUTPATIENT
Start: 2020-06-22 | End: 2020-06-22 | Stop reason: HOSPADM

## 2020-06-22 RX ORDER — FENTANYL CITRATE 50 UG/ML
INJECTION, SOLUTION INTRAMUSCULAR; INTRAVENOUS AS NEEDED
Status: DISCONTINUED | OUTPATIENT
Start: 2020-06-22 | End: 2020-06-22 | Stop reason: SURG

## 2020-06-22 RX ORDER — ALBUTEROL SULFATE 2.5 MG/3ML
2.5 SOLUTION RESPIRATORY (INHALATION) ONCE AS NEEDED
Status: DISCONTINUED | OUTPATIENT
Start: 2020-06-22 | End: 2020-06-22 | Stop reason: HOSPADM

## 2020-06-22 RX ORDER — HYDROMORPHONE HCL/PF 1 MG/ML
0.2 SYRINGE (ML) INJECTION
Status: DISCONTINUED | OUTPATIENT
Start: 2020-06-22 | End: 2020-06-22 | Stop reason: HOSPADM

## 2020-06-22 RX ORDER — ACETAMINOPHEN 325 MG/1
650 TABLET ORAL EVERY 6 HOURS PRN
COMMUNITY
End: 2020-10-20

## 2020-06-22 RX ORDER — ACETAMINOPHEN 325 MG/1
650 TABLET ORAL EVERY 6 HOURS PRN
Status: DISCONTINUED | OUTPATIENT
Start: 2020-06-22 | End: 2020-06-22 | Stop reason: HOSPADM

## 2020-06-22 RX ORDER — IBUPROFEN 600 MG/1
600 TABLET ORAL EVERY 6 HOURS PRN
Status: DISCONTINUED | OUTPATIENT
Start: 2020-06-22 | End: 2020-06-22 | Stop reason: HOSPADM

## 2020-06-22 RX ORDER — GLYCOPYRROLATE 0.2 MG/ML
INJECTION INTRAMUSCULAR; INTRAVENOUS AS NEEDED
Status: DISCONTINUED | OUTPATIENT
Start: 2020-06-22 | End: 2020-06-22 | Stop reason: SURG

## 2020-06-22 RX ORDER — MAGNESIUM HYDROXIDE 1200 MG/15ML
LIQUID ORAL AS NEEDED
Status: DISCONTINUED | OUTPATIENT
Start: 2020-06-22 | End: 2020-06-22 | Stop reason: HOSPADM

## 2020-06-22 RX ORDER — ONDANSETRON 2 MG/ML
INJECTION INTRAMUSCULAR; INTRAVENOUS AS NEEDED
Status: DISCONTINUED | OUTPATIENT
Start: 2020-06-22 | End: 2020-06-22 | Stop reason: SURG

## 2020-06-22 RX ORDER — DEXAMETHASONE SODIUM PHOSPHATE 4 MG/ML
INJECTION, SOLUTION INTRA-ARTICULAR; INTRALESIONAL; INTRAMUSCULAR; INTRAVENOUS; SOFT TISSUE AS NEEDED
Status: DISCONTINUED | OUTPATIENT
Start: 2020-06-22 | End: 2020-06-22 | Stop reason: SURG

## 2020-06-22 RX ORDER — SODIUM CHLORIDE 9 MG/ML
125 INJECTION, SOLUTION INTRAVENOUS CONTINUOUS
Status: DISCONTINUED | OUTPATIENT
Start: 2020-06-22 | End: 2020-06-22

## 2020-06-22 RX ORDER — SCOLOPAMINE TRANSDERMAL SYSTEM 1 MG/1
1 PATCH, EXTENDED RELEASE TRANSDERMAL ONCE AS NEEDED
Status: DISCONTINUED | OUTPATIENT
Start: 2020-06-22 | End: 2020-06-22

## 2020-06-22 RX ORDER — HYDROMORPHONE HCL/PF 1 MG/ML
SYRINGE (ML) INJECTION AS NEEDED
Status: DISCONTINUED | OUTPATIENT
Start: 2020-06-22 | End: 2020-06-22 | Stop reason: SURG

## 2020-06-22 RX ORDER — OXYCODONE HYDROCHLORIDE 5 MG/1
10 TABLET ORAL EVERY 4 HOURS PRN
Status: DISCONTINUED | OUTPATIENT
Start: 2020-06-22 | End: 2020-06-22 | Stop reason: HOSPADM

## 2020-06-22 RX ADMIN — SODIUM CHLORIDE 125 ML/HR: 0.9 INJECTION, SOLUTION INTRAVENOUS at 07:58

## 2020-06-22 RX ADMIN — FENTANYL CITRATE 100 MCG: 50 INJECTION, SOLUTION INTRAMUSCULAR; INTRAVENOUS at 08:49

## 2020-06-22 RX ADMIN — NEOSTIGMINE METHYLSULFATE 5 MG: 1 INJECTION, SOLUTION INTRAVENOUS at 10:25

## 2020-06-22 RX ADMIN — DEXAMETHASONE SODIUM PHOSPHATE 4 MG: 4 INJECTION, SOLUTION INTRAMUSCULAR; INTRAVENOUS at 09:02

## 2020-06-22 RX ADMIN — FENTANYL CITRATE 25 MCG: 50 INJECTION, SOLUTION INTRAMUSCULAR; INTRAVENOUS at 10:44

## 2020-06-22 RX ADMIN — ONDANSETRON 4 MG: 2 INJECTION INTRAMUSCULAR; INTRAVENOUS at 10:19

## 2020-06-22 RX ADMIN — LIDOCAINE HYDROCHLORIDE 60 MG: 20 INJECTION, SOLUTION INTRAVENOUS at 08:49

## 2020-06-22 RX ADMIN — PROPOFOL 200 MG: 10 INJECTION, EMULSION INTRAVENOUS at 08:49

## 2020-06-22 RX ADMIN — FENTANYL CITRATE 25 MCG: 50 INJECTION, SOLUTION INTRAMUSCULAR; INTRAVENOUS at 10:40

## 2020-06-22 RX ADMIN — FENTANYL CITRATE 50 MCG: 50 INJECTION INTRAMUSCULAR; INTRAVENOUS at 11:03

## 2020-06-22 RX ADMIN — SUGAMMADEX 200 MG: 100 INJECTION, SOLUTION INTRAVENOUS at 10:38

## 2020-06-22 RX ADMIN — FENTANYL CITRATE 50 MCG: 50 INJECTION, SOLUTION INTRAMUSCULAR; INTRAVENOUS at 10:11

## 2020-06-22 RX ADMIN — ROCURONIUM BROMIDE 20 MG: 10 INJECTION, SOLUTION INTRAVENOUS at 09:58

## 2020-06-22 RX ADMIN — FENTANYL CITRATE 50 MCG: 50 INJECTION INTRAMUSCULAR; INTRAVENOUS at 11:13

## 2020-06-22 RX ADMIN — KETOROLAC TROMETHAMINE 30 MG: 30 INJECTION, SOLUTION INTRAMUSCULAR at 10:35

## 2020-06-22 RX ADMIN — SODIUM CHLORIDE: 0.9 INJECTION, SOLUTION INTRAVENOUS at 09:43

## 2020-06-22 RX ADMIN — OXYCODONE HYDROCHLORIDE 5 MG: 5 TABLET ORAL at 12:50

## 2020-06-22 RX ADMIN — GLYCOPYRROLATE 0.8 MG: 0.2 INJECTION, SOLUTION INTRAMUSCULAR; INTRAVENOUS at 10:25

## 2020-06-22 RX ADMIN — ROCURONIUM BROMIDE 50 MG: 10 INJECTION, SOLUTION INTRAVENOUS at 08:49

## 2020-06-22 RX ADMIN — FENTANYL CITRATE 50 MCG: 50 INJECTION INTRAMUSCULAR; INTRAVENOUS at 11:39

## 2020-06-22 RX ADMIN — SCOPALAMINE 1 PATCH: 1 PATCH, EXTENDED RELEASE TRANSDERMAL at 07:49

## 2020-06-22 RX ADMIN — MIDAZOLAM 2 MG: 1 INJECTION INTRAMUSCULAR; INTRAVENOUS at 08:38

## 2020-06-22 RX ADMIN — HYDROMORPHONE HYDROCHLORIDE 0.5 MG: 1 INJECTION, SOLUTION INTRAMUSCULAR; INTRAVENOUS; SUBCUTANEOUS at 10:47

## 2020-06-22 RX ADMIN — FENTANYL CITRATE 50 MCG: 50 INJECTION INTRAMUSCULAR; INTRAVENOUS at 11:23

## 2020-06-23 ENCOUNTER — TELEPHONE (OUTPATIENT)
Dept: OBGYN CLINIC | Facility: MEDICAL CENTER | Age: 40
End: 2020-06-23

## 2020-06-24 DIAGNOSIS — Z98.890 S/P LAPAROSCOPY: ICD-10-CM

## 2020-06-25 RX ORDER — OXYCODONE HYDROCHLORIDE AND ACETAMINOPHEN 5; 325 MG/1; MG/1
1 TABLET ORAL EVERY 6 HOURS PRN
Qty: 10 TABLET | Refills: 0 | Status: SHIPPED | OUTPATIENT
Start: 2020-06-25 | End: 2020-07-05

## 2020-07-15 ENCOUNTER — OFFICE VISIT (OUTPATIENT)
Dept: OBGYN CLINIC | Facility: MEDICAL CENTER | Age: 40
End: 2020-07-15

## 2020-07-15 VITALS
DIASTOLIC BLOOD PRESSURE: 60 MMHG | SYSTOLIC BLOOD PRESSURE: 120 MMHG | BODY MASS INDEX: 26.52 KG/M2 | WEIGHT: 165 LBS | TEMPERATURE: 99.1 F | HEIGHT: 66 IN

## 2020-07-15 DIAGNOSIS — Z09 POSTOPERATIVE EXAMINATION: Primary | ICD-10-CM

## 2020-07-15 DIAGNOSIS — Z90.79 STATUS POST BILATERAL SALPINGECTOMY: ICD-10-CM

## 2020-07-15 DIAGNOSIS — N80.9 ENDOMETRIOSIS: ICD-10-CM

## 2020-07-15 DIAGNOSIS — Z90.721 S/P RIGHT OOPHORECTOMY: ICD-10-CM

## 2020-07-15 DIAGNOSIS — N83.201 HEMORRHAGIC CYST OF RIGHT OVARY: ICD-10-CM

## 2020-07-15 DIAGNOSIS — Z30.2 REQUEST FOR STERILIZATION: ICD-10-CM

## 2020-07-15 PROCEDURE — 3008F BODY MASS INDEX DOCD: CPT | Performed by: OBSTETRICS & GYNECOLOGY

## 2020-07-15 PROCEDURE — 99024 POSTOP FOLLOW-UP VISIT: CPT | Performed by: OBSTETRICS & GYNECOLOGY

## 2020-07-15 NOTE — PROGRESS NOTES
Assessment:  38 y o   who is POD#3wks from diagnostic laparoscopy, right oophorectomy, bilateral salpingectomy, and fulguration of endometriosis for right hemorrhagic ovarian cyst and endometriosis  She is doing well post-op  Plan:  Diagnoses and all orders for this visit:    Postoperative examination  S/P right oophorectomy  Status post bilateral salpingectomy  - Routine postop care  - Discussed postop care   - Pathology reviewed  - Return for yearly    Hemorrhagic cyst of right ovary  Endometriosis  - Continue OCP  - Monitor symptoms    Request for sterilization  - s/p salpingectomy        __________________________________________________________________    Subjective   Carolyn Figueroa is a 38 y o  [id number] who presents POD#3wks from diagnostic laparoscopy, right oophorectomy, bilateral salpingectomy, and fulguration of endometriosis for right hemorrhagic ovarian cyst and endometriosis  Patient reports shes doing well  She had some pain the first few days after surgery, but resolved now  Also feels a little more fatigued this week, but also improving  She reports she had an abnormal menses after her surgery; lasted nearly 2wks  Her incision is healing well; she denies redness or drainage, but is bothered by a small amount of suture sticking out  Her bowel function is normal and she is having regular BM's  She has  returned to most of her normal activities  She has not yet returned to sexual activity  Objective  /60   Temp 99 1 °F (37 3 °C)   Ht 5' 5 5" (1 664 m)   Wt 74 8 kg (165 lb)   LMP 2020   BMI 27 04 kg/m²      Physical Exam:  Physical Exam   Constitutional: She is oriented to person, place, and time  She appears well-developed and well-nourished  No distress  HENT:   Head: Normocephalic and atraumatic  Eyes: No scleral icterus  Cardiovascular: Normal rate  Pulmonary/Chest: Effort normal  No accessory muscle usage  No respiratory distress  Abdominal: Soft  She exhibits no distension  There is no tenderness  There is no rebound and no guarding  Incisions clean, dry, and intact  No erythema or drainage  Very small piece of suture protruding from b/l lower quadrant ports, trimmed at patients request   Musculoskeletal: She exhibits no edema or tenderness  Neurological: She is alert and oriented to person, place, and time  Skin: Skin is warm and dry  No rash noted  No erythema  Psychiatric: She has a normal mood and affect  Her behavior is normal      Surgical Pathology  "A  Right ovary and bilateral fallopian tubes, oophorectomy and salpingectomies:  -  Benign ovary with benign serous cystadenoma, follicular cysts, and endosalpingiosis, negative for atypia or malignancy  -  Complete cross-sections of benign bilateral fallopian tubes and fimbria, negative for atypia or malignancy  "

## 2020-09-09 ENCOUNTER — TELEPHONE (OUTPATIENT)
Dept: GASTROENTEROLOGY | Facility: MEDICAL CENTER | Age: 40
End: 2020-09-09

## 2020-09-09 NOTE — LETTER
September 9, 2020     [place]e [id number]      Dear Ms Stroud:    Based on our records you are due to have a follow up with Dr Alexandria Ferguson  Please call the [facility] office to schedule at [phone]    If you have any questions or concerns, please don't hesitate to call      Sincerely,          [facility]

## 2020-09-29 ENCOUNTER — ANNUAL EXAM (OUTPATIENT)
Dept: OBGYN CLINIC | Facility: MEDICAL CENTER | Age: 40
End: 2020-09-29
Payer: COMMERCIAL

## 2020-09-29 VITALS
BODY MASS INDEX: 27.94 KG/M2 | TEMPERATURE: 98.3 F | WEIGHT: 170.5 LBS | DIASTOLIC BLOOD PRESSURE: 70 MMHG | SYSTOLIC BLOOD PRESSURE: 112 MMHG

## 2020-09-29 DIAGNOSIS — L65.9 HAIR LOSS: ICD-10-CM

## 2020-09-29 DIAGNOSIS — R63.5 WEIGHT GAIN: ICD-10-CM

## 2020-09-29 DIAGNOSIS — Z01.419 WELL WOMAN EXAM WITH ROUTINE GYNECOLOGICAL EXAM: Primary | ICD-10-CM

## 2020-09-29 DIAGNOSIS — N80.9 ENDOMETRIOSIS: ICD-10-CM

## 2020-09-29 DIAGNOSIS — Z12.39 SCREENING FOR MALIGNANT NEOPLASM OF BREAST: ICD-10-CM

## 2020-09-29 PROCEDURE — 99396 PREV VISIT EST AGE 40-64: CPT | Performed by: OBSTETRICS & GYNECOLOGY

## 2020-09-29 RX ORDER — NORGESTIMATE AND ETHINYL ESTRADIOL 7DAYSX3 LO
1 KIT ORAL DAILY
Qty: 84 TABLET | Refills: 3 | Status: SHIPPED | OUTPATIENT
Start: 2020-09-29 | End: 2020-12-03 | Stop reason: SDUPTHER

## 2020-09-29 RX ORDER — NORGESTIMATE AND ETHINYL ESTRADIOL 7DAYSX3 LO
1 KIT ORAL DAILY
COMMUNITY
End: 2020-09-29 | Stop reason: SDUPTHER

## 2020-09-29 NOTE — PROGRESS NOTES
Assessment   38 y o  [id number] with regular menses who is sexually active and currently using contraception (orthotricyclen for endo, s/p sterilization) presenting for annual exam      Plan   Diagnoses and all orders for this visit:    Well woman exam with routine gynecological exam  - Pap up to date  - Clinicians breast exam and teaching done  - Mammo slip given  - Return in 1yr for yearly    Screening for malignant neoplasm of breast  -     Mammo screening bilateral w 3d & cad; Future    Endometriosis  -     norgestimate-ethinyl estradiol (ORTHO TRI-CYCLEN LO) 0 18/0 215/0 25 MG-25 MCG per tablet; Take 1 tablet by mouth daily  -  S/p laparoscopy with fulguration and right oophorectomy in July 2020  - Asymptomatic at present    Weight gain  Hair loss  -     Follicle stimulating hormone; Future  -     TSH, 3rd generation with Free T4 reflex; Future  -     Luteinizing hormone; Future  -     CBC and Platelet; Future  -     Basic metabolic panel; Future  - Has follow up with PCP to further discuss      __________________________________________________________________      Subjective     Abdoulaye Gustafson is a 38 y o  [id number] with regular menses who is sexually active and currently using contraception (orthotricyclen for endo, s/p sterilization) presenting for annual exam  She is recently s/p laparoscopy for fulguration of endometriosis and right oophorectomy due to persistent hemorraghic cyst in July 2020  Today she reports new onset hair loss  Seeing more strands and hair feels thinner; no bald spots  Reports she recently came off of Prozac, unsure if this what caused it  Also feels shes been gaining weight  Working from home, so she feels shes been eating better and exercising more without her commute       GYN  Complaints: denies  Denies change in menstrual cycle, dysmenorrhea, dyspareunia, genital discharge, genital ulcers, irregular/heavy menses, pelvic pain and vulvar/vaginal symptoms  Menarche at age 11  Periods are regular every 28-30 days, lasting 4 days  Dysmenorrhea: less cramping  Cyclic symptoms include breast tenderness  Sexually active: Yes - single partner - male  Hx STI: denies   Hx Abnormal pap: denies  Last pap: 2018 - NILM    OB  [id number]   Pregnancy complications: triplets  S/p salpingectomy for sterilization       Complaints: denies  Denies urinary frequency, hematuria, urinary incontinence and dysuria    BREAST  Complaints: denies  Denies: breast lump, breast tenderness, changed mole, dryness, pruritus, rash, skin color change and skin lesion(s)  Last mammogram: 2019 - birad2  Personal hx: denies  Family hx: denies fhx of breast, uterine, ovarian cancers  Grandmother with colon ca (62s, paternal)  Patient does do regular self-exams    GENERAL  PMH reviewed/updated and is as below  Patient does follow with a PCP  Works as in billing/coding for [employer]  Denies domestic violence    Exercise: barre3 and dance exercises, but gained 5lbs despite improvements in exercise and diet  Diet: improved since working from home    SCREENING  Cervical Ca: pap due next year  Breast Ca: mammo slip given, clinicians breast exam done  Colon Ca: follows with GI due to crohns; colonoscopy 2020, due 2021      Past Medical History:   Diagnosis Date    Arthritis     assoc w Crohn's    Avascular necrosis ([address] )     right ankle, right hip, left elbow    Crohn's disease ([address] )     Endometriosis     GERD (gastroesophageal reflux disease)     Gestational diabetes mellitus     Gestational    Malignant melanoma ([address] )     on back, had wide excision    Orbital myositis     Bilateral    PONV (postoperative nausea and vomiting)     Right ovarian cyst 2020    Thyroid disease     nodules       Past Surgical History:   Procedure Laterality Date    ABDOMINOPLASTY       SECTION  2004    CYSTOSCOPY N/A 2020    Procedure: CYSTOSCOPY;  Surgeon: Jovanna Jose MD;  [facility]; Service: Gynecology    DENTAL SURGERY      DIAGNOSTIC LAPAROSCOPY  06/2003    for endometriosis    DILATION AND CURETTAGE OF UTERUS      HERNIA REPAIR  2007    KNEE SURGERY Right     torn meniscus    OK LAP,DIAGNOSTIC ABDOMEN N/A 6/22/2020    Procedure: DX LAP;  Surgeon: Bettie Venegas MD;  [facility];  Service: Gynecology    OK LAP,FULGURATE/EXCISE LESIONS N/A 6/22/2020    Procedure: Willam Baltheresa;  Surgeon: Bettie Venegas MD;  [facility];  Service: Gynecology    OK LAP,RMV  ADNEXAL STRUCTURE Bilateral 6/22/2020    Procedure: SALPINGECTOMY;  Surgeon: Bettie Venegas MD;  [facility];  Service: Gynecology    OK LAP,RMV  ADNEXAL STRUCTURE Right 6/22/2020    Procedure: OOPHORECTOMY;  Surgeon: Bettie Venegas MD;  [facility];  Service: Gynecology    SKIN LESION EXCISION  2008    melanoma of back         Current Outpatient Medications:     acetaminophen (TYLENOL) 325 mg tablet, Take 650 mg by mouth every 6 (six) hours as needed for mild pain, Disp: , Rfl:     Ascorbic Acid (VITAMIN C) 500 MG CAPS, Take by mouth daily , Disp: , Rfl:     Calcium Carbonate-Vitamin D (CALCIUM PLUS VITAMIN D PO), Take by mouth daily , Disp: , Rfl:     clonazePAM (KlonoPIN) 1 mg tablet, Take 1 tablet (1 mg total) by mouth daily at bedtime, Disp: 90 tablet, Rfl: 1    ibuprofen (MOTRIN) 600 mg tablet, Take 1 tablet (600 mg total) by mouth every 6 (six) hours as needed for mild pain (cramping), Disp: 50 tablet, Rfl: 1    Multiple Vitamins-Minerals (WOMENS MULTIVITAMIN) TABS, Take 1 tablet by mouth daily , Disp: , Rfl:     norgestimate-ethinyl estradiol (ORTHO TRI-CYCLEN LO) 0 18/0 215/0 25 MG-25 MCG per tablet, Take 1 tablet by mouth daily, Disp: 84 tablet, Rfl: 3    Allergies   Allergen Reactions    Other Anaphylaxis     Annotation - [id number]: [place] bug spray    Latex Rash    Tape [Medical Tape] Rash       Social History     Tobacco Use    Smoking status: Never Smoker    Smokeless tobacco: Never Used   Substance Use Topics    Alcohol use: Yes     Frequency: 2-4 times a month     Drinks per session: 1 or 2     Comment: Social use    Drug use: No           Objective  /70 (BP Location: Right arm, Patient Position: Sitting, Cuff Size: Standard)   Temp 98 3 °F (36 8 °C) (Temporal)   Wt 77 3 kg (170 lb 8 oz)   LMP 08/19/2020   BMI 27 94 kg/m²      Physical Exam:  Physical Exam  Exam conducted with a chaperone present  Constitutional:       General: She is not in acute distress  Appearance: She is well-developed  HENT:      Head: Normocephalic and atraumatic  Eyes:      General: No scleral icterus  Cardiovascular:      Rate and Rhythm: Normal rate  Pulmonary:      Effort: Pulmonary effort is normal  No accessory muscle usage or respiratory distress  Chest:      Breasts:         Right: No inverted nipple, mass, nipple discharge, skin change or tenderness  Left: No inverted nipple, mass, nipple discharge, skin change or tenderness  Abdominal:      General: There is no distension  Palpations: Abdomen is soft  There is no mass  Tenderness: There is no abdominal tenderness  There is no guarding or rebound  Genitourinary:     General: Normal vulva  Exam position: Lithotomy position  Labia:         Right: No rash, tenderness or lesion  Left: No rash, tenderness or lesion  Vagina: No signs of injury  No vaginal discharge, erythema or tenderness  Cervix: No cervical motion tenderness or discharge  Uterus: Not enlarged, not fixed and not tender  Adnexa:         Right: No mass (surgically absent)  Left: No mass, tenderness or fullness  Rectum: No external hemorrhoid  Normal anal tone  Comments: Urethral meatus: normal  Skin:     General: Skin is warm and dry  Findings: No erythema or rash     Neurological:      Mental Status: She is alert and oriented to person, place, and time    Psychiatric:         Behavior: Behavior normal          Thought Content:  Thought content normal

## 2020-10-20 ENCOUNTER — APPOINTMENT (OUTPATIENT)
Dept: RADIOLOGY | Facility: MEDICAL CENTER | Age: 40
End: 2020-10-20
Payer: COMMERCIAL

## 2020-10-20 ENCOUNTER — OFFICE VISIT (OUTPATIENT)
Dept: FAMILY MEDICINE CLINIC | Facility: CLINIC | Age: 40
End: 2020-10-20
Payer: COMMERCIAL

## 2020-10-20 VITALS
DIASTOLIC BLOOD PRESSURE: 74 MMHG | BODY MASS INDEX: 27 KG/M2 | SYSTOLIC BLOOD PRESSURE: 102 MMHG | WEIGHT: 168 LBS | TEMPERATURE: 97.9 F | HEIGHT: 66 IN

## 2020-10-20 DIAGNOSIS — Z90.721 S/P RIGHT OOPHORECTOMY: ICD-10-CM

## 2020-10-20 DIAGNOSIS — G89.29 CHRONIC PAIN OF RIGHT KNEE: ICD-10-CM

## 2020-10-20 DIAGNOSIS — E04.1 NONTOXIC SINGLE THYROID NODULE: ICD-10-CM

## 2020-10-20 DIAGNOSIS — M87.00 AVASCULAR BONE NECROSIS (HCC): ICD-10-CM

## 2020-10-20 DIAGNOSIS — F41.1 GENERALIZED ANXIETY DISORDER: ICD-10-CM

## 2020-10-20 DIAGNOSIS — G89.29 CHRONIC PAIN OF RIGHT KNEE: Primary | ICD-10-CM

## 2020-10-20 DIAGNOSIS — Z90.79 STATUS POST BILATERAL SALPINGECTOMY: ICD-10-CM

## 2020-10-20 DIAGNOSIS — M25.561 CHRONIC PAIN OF RIGHT KNEE: ICD-10-CM

## 2020-10-20 DIAGNOSIS — M25.561 CHRONIC PAIN OF RIGHT KNEE: Primary | ICD-10-CM

## 2020-10-20 PROCEDURE — 99214 OFFICE O/P EST MOD 30 MIN: CPT | Performed by: FAMILY MEDICINE

## 2020-10-20 PROCEDURE — 73562 X-RAY EXAM OF KNEE 3: CPT

## 2020-10-27 ENCOUNTER — HOSPITAL ENCOUNTER (OUTPATIENT)
Dept: ULTRASOUND IMAGING | Facility: MEDICAL CENTER | Age: 40
Discharge: HOME | End: 2020-10-27
Payer: COMMERCIAL

## 2020-10-27 DIAGNOSIS — E04.1 NONTOXIC SINGLE THYROID NODULE: ICD-10-CM

## 2020-10-27 PROCEDURE — 76536 US EXAM OF HEAD AND NECK: CPT

## 2020-12-03 DIAGNOSIS — F51.01 PRIMARY INSOMNIA: ICD-10-CM

## 2020-12-03 DIAGNOSIS — N80.9 ENDOMETRIOSIS: ICD-10-CM

## 2020-12-03 RX ORDER — CLONAZEPAM 1 MG/1
1 TABLET ORAL
Qty: 90 TABLET | Refills: 0 | Status: SHIPPED | OUTPATIENT
Start: 2020-12-03 | End: 2021-03-21 | Stop reason: SDUPTHER

## 2020-12-03 NOTE — TELEPHONE ENCOUNTER
Patient is requesting refill of Klonopin  She was seen in October 2020  Have placed order  Thank you

## 2020-12-04 RX ORDER — NORGESTIMATE AND ETHINYL ESTRADIOL 7DAYSX3 LO
1 KIT ORAL DAILY
Qty: 84 TABLET | Refills: 3 | Status: SHIPPED | OUTPATIENT
Start: 2020-12-04 | End: 2021-10-06 | Stop reason: ALTCHOICE

## 2020-12-28 ENCOUNTER — IMMUNIZATIONS (OUTPATIENT)
Dept: FAMILY MEDICINE CLINIC | Facility: HOSPITAL | Age: 40
End: 2020-12-28
Payer: COMMERCIAL

## 2020-12-28 DIAGNOSIS — Z23 ENCOUNTER FOR IMMUNIZATION: ICD-10-CM

## 2020-12-28 PROCEDURE — 91301 SARS-COV-2 / COVID-19 MRNA VACCINE (MODERNA) 100 MCG: CPT

## 2020-12-28 PROCEDURE — 0011A SARS-COV-2 / COVID-19 MRNA VACCINE (MODERNA) 100 MCG: CPT

## 2021-01-05 ENCOUNTER — HOSPITAL ENCOUNTER (OUTPATIENT)
Dept: MAMMOGRAPHY | Facility: MEDICAL CENTER | Age: 41
Discharge: HOME | End: 2021-01-05
Payer: COMMERCIAL

## 2021-01-05 VITALS — HEIGHT: 66 IN | WEIGHT: 168 LBS | BODY MASS INDEX: 27 KG/M2

## 2021-01-05 DIAGNOSIS — Z12.31 VISIT FOR SCREENING MAMMOGRAM: ICD-10-CM

## 2021-01-05 DIAGNOSIS — Z12.39 SCREENING FOR MALIGNANT NEOPLASM OF BREAST: ICD-10-CM

## 2021-01-05 PROCEDURE — 77067 SCR MAMMO BI INCL CAD: CPT

## 2021-01-05 PROCEDURE — 77063 BREAST TOMOSYNTHESIS BI: CPT

## 2021-01-13 ENCOUNTER — HOSPITAL ENCOUNTER (OUTPATIENT)
Dept: ULTRASOUND IMAGING | Facility: CLINIC | Age: 41
Discharge: HOME | End: 2021-01-13
Payer: COMMERCIAL

## 2021-01-13 ENCOUNTER — HOSPITAL ENCOUNTER (OUTPATIENT)
Dept: MAMMOGRAPHY | Facility: CLINIC | Age: 41
Discharge: HOME | End: 2021-01-13
Payer: COMMERCIAL

## 2021-01-13 VITALS — HEIGHT: 66 IN | BODY MASS INDEX: 27 KG/M2 | WEIGHT: 168 LBS

## 2021-01-13 DIAGNOSIS — R92.8 ABNORMAL SCREENING MAMMOGRAM: ICD-10-CM

## 2021-01-13 PROCEDURE — 77065 DX MAMMO INCL CAD UNI: CPT

## 2021-01-13 PROCEDURE — G0279 TOMOSYNTHESIS, MAMMO: HCPCS

## 2021-01-13 PROCEDURE — 76642 ULTRASOUND BREAST LIMITED: CPT

## 2021-01-14 ENCOUNTER — TRANSCRIBE ORDERS (OUTPATIENT)
Dept: ADMINISTRATIVE | Facility: HOSPITAL | Age: 41
End: 2021-01-14

## 2021-01-14 DIAGNOSIS — R92.8 ABNORMAL MAMMOGRAM: Primary | ICD-10-CM

## 2021-01-25 ENCOUNTER — IMMUNIZATIONS (OUTPATIENT)
Dept: FAMILY MEDICINE CLINIC | Facility: HOSPITAL | Age: 41
End: 2021-01-25

## 2021-01-25 DIAGNOSIS — Z23 ENCOUNTER FOR IMMUNIZATION: Primary | ICD-10-CM

## 2021-01-25 PROCEDURE — 0012A SARS-COV-2 / COVID-19 MRNA VACCINE (MODERNA) 100 MCG: CPT

## 2021-01-25 PROCEDURE — 91301 SARS-COV-2 / COVID-19 MRNA VACCINE (MODERNA) 100 MCG: CPT

## 2021-01-26 ENCOUNTER — TELEPHONE (OUTPATIENT)
Dept: GASTROENTEROLOGY | Facility: MEDICAL CENTER | Age: 41
End: 2021-01-26

## 2021-01-26 ENCOUNTER — TELEMEDICINE (OUTPATIENT)
Dept: GASTROENTEROLOGY | Facility: MEDICAL CENTER | Age: 41
End: 2021-01-26
Payer: COMMERCIAL

## 2021-01-26 DIAGNOSIS — K50.00 CROHN'S DISEASE OF SMALL INTESTINE WITHOUT COMPLICATION (HCC): Primary | ICD-10-CM

## 2021-01-26 PROCEDURE — 99214 OFFICE O/P EST MOD 30 MIN: CPT | Performed by: INTERNAL MEDICINE

## 2021-01-26 NOTE — TELEPHONE ENCOUNTER
----- Message from Jesus Kwong MD sent at 1/26/2021  8:48 AM EST -----  Dear Staff,     I have seen this patient as a virtual visit  During the visit the following tests/ recommendations were made  Please send the patient the requisitions for the tests and help them with scheduling as needed  Please also send them the instructions (if any listed) by mail/ email  Thank you       Jesus Kwong MD      Procedures : None , No procedure     Testing : Stool studies, CT Scan    Referrals : None    Preparation for Colonoscopy : None    Follow up : [place] in 6 month    Instructions : None

## 2021-01-26 NOTE — PROGRESS NOTES
[facility] - Outpatient Follow-up Note  Ashutosh Miranda 38 y o  female MRN: [id number]  Encounter: [id number]          Virtual Regular Visit      Assessment/Plan: 38year old female Crohn's disease of the small and large intestine, diagnosed 23 years ago currently off treatment who presents for follow up evaluation  1  Crohn's disease of small intestine without complication ([facility] [address] )  She has history of ileocolonic Crohn's disease for over 21 years was diagnosed at age 15  Prior treatments include 5 ASA, steroids, Imuran as documented below  She has been off all medications for the last 12 years  She had a flare of her symptoms in the spring of 2020 underwent colonoscopy showing mild to moderate proctitis to 10 cm from the anal verge and 6 mm ileal aphthous ulceration  She was started on course of Canasa budesonide  She has since completed these treatments 9 months ago  She has complete resolution of her symptoms  She prefers to stay off maintenance medication if possible  I recommend obtaining CT enterography and fecal calprotectin for updated evaluation of her disease status  If these studies are unremarkable she require repeat colonoscopy with terminal ileal invasion next year  If she has inflammation on her imaging or elevated fecal calprotectin she will require sooner colonoscopy for exam      Continue treatment with:  Patient currently not on treatment and will be evaluated with CT and fecal calprotectin as stated above  Next colonoscopy will be in 2022 pending results of CT scan  Obtain laboratory work for CBC , LFTs , iron studies , B12 at next follow-up visit    Follow-up in 6 months      Health maintenance:  - Yearly flu shot  Avoid live vaccines - Pneumovax and Prevnar, her PCP  - Routine pap smear per gyn  - Yearly skin exam  - yearly ophtho exam     - Calprotectin,Fecal; Future  - CT small bowel enterography;  Future      Problem List Items Addressed This Visit        Digestive    Crohn's disease [facility]) - Primary    Relevant Orders    Calprotectin,Fecal    CT small bowel enterography               Reason for visit is   Chief Complaint   Patient presents with    Virtual Regular Visit        Encounter provider Kenya Ordoñez MD    Provider located at [address] [address]      Recent Visits  No visits were found meeting these conditions  Showing recent visits within past 7 days and meeting all other requirements     Today's Visits  Date Type Provider Dept   01/26/21 Telemedicine Kenya Ordoñez MD [facility] today's visits and meeting all other requirements     Future Appointments  No visits were found meeting these conditions  Showing future appointments within next 150 days and meeting all other requirements        The patient was identified by name and date of birth  Cuca Rizvi was informed that this is a telemedicine visit and that the visit is being conducted through [employer] and patient was informed that this is a secure, HIPAA-compliant platform  She agrees to proceed     My office door was closed  No one else was in the room  She acknowledged consent and understanding of privacy and security of the video platform  The patient has agreed to participate and understands they can discontinue the visit at any time  Patient is aware this is a billable service  Subjective  Cuca Rizvi is a 38 y o  female  female Crohn's disease of the small and large intestine, diagnosed 23 years ago currently off treatment who presents for follow up evaluation   Interval history:  Her last office visit was via telemedicine April 2020  She had previously undergone colonoscopy in March 2020 showing 6 mm aphthous ulcer in the terminal ileum mild-to-moderate proctitis and prolapsing skin tag the anal verge    Pathology showed focal active cryptitis  and mild chronic colitis at 10 and 5 cm from the anal verge, terminal ileal biopsies were normal   She was started on Canasa suppositories with resolution of her symptoms  She was also started on budesonide taper as well  She reports after taking the course of Canasa and budesonide she has complete resolution of her symptoms  She has not used Canasa for almost 6 months  She reports bowel movements 1 to 2 times a day which are formed without mucus or blood  She intermittently has periumbilical abdominal pain which is not severe and nonradiating  Her appetite is good her weight is stable  She recently had the 2nd COVID-19 vaccine yesterday and now reports temperature of 100° and muscle aches with headache  In June 2020 she underwent diagnostic laparoscopy, bilateral salpingectomy, right oophorectomy and fulguration of endometriosis    The      IBD history:  She was diagnosed with Crohn's disease at age 15 after presenting with abdominal pain and bloody bowel movements   She had previously had GI issues several years prior to this  Marybel Vásquez reports undergoing a colonoscopy and was diagnosed with inflammatory bowel disease of the small large intestine   She was initially on high-dose steroids for several years and TPN for bowel rest   She was ultimately transition to 5 ASA agents including Pentasa and Asacol   However these were discontinued due to nausea   She was ultimately placed on Imuran, the dose of which she cannot recall   She remain on Imuran to her age 20s up until the time that she was trying to get pregnant and discontinue the medication   She resumed the medication after her pregnancy and at age 28 was diagnosed with melanoma   Afterwards the Imuran was discontinued and she has been off all medications since  Marybel Vásquez does note being diagnosed with avascular necrosis given her long use of steroids early on in her disease course   She has no history of fistula or perianal disease     Prior treatments  5ASA: Pentasa, Asacol initially at diagnosis  Stopped due to nausea and GI upset  Steroids: yes  None for 10 years  Imuran: yes, on for 6-8 years  Stopped due to history of melanoma  Biologics: none prior      Extra intestinal manifestations:  At the time of her diagnosis she presented also with erythema nodosum   In her early 26 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   For this she received high-dose of oral prednisone 80 mg daily   It has been 10 years since her last steroid use  Naseemlatricia Gan does note intermittent joint discomfort particularly her ankles   HPI     Past Medical History:   Diagnosis Date    Arthritis     assoc w Crohn's    Avascular necrosis [address] [address] )     right ankle, right hip, left elbow    Crohn's disease ([facility] [address] )     Endometriosis     GERD (gastroesophageal reflux disease)     Gestational diabetes mellitus     Gestational    Malignant melanoma ([facility] [address] )     on back, had wide excision    Orbital myositis     Bilateral    PONV (postoperative nausea and vomiting)     Right ovarian cyst 2020    Thyroid disease     nodules       Past Surgical History:   Procedure Laterality Date    ABDOMINOPLASTY       SECTION      CYSTOSCOPY N/A 2020    Procedure: CYSTOSCOPY;  Surgeon: Syd Lee MD;  [facility];  Service: Gynecology   [address]  [address]    for endometriosis    DILATION AND CURETTAGE OF UTERUS      HERNIA REPAIR      HYSTERECTOMY W/ SALPINGO-OOPHERECTOMY      KNEE SURGERY Right     torn meniscus    CT LAP,DIAGNOSTIC ABDOMEN N/A 2020    Procedure: DX LAP;  Surgeon: Syd Lee MD;  [facility];  Service: Gynecology    CT LAP,FULGURATE/EXCISE LESIONS N/A 2020    Procedure: Larisa Neve;  Surgeon: Syd Lee MD;  [facility];  Service: Gynecology    CT LAP,RMV  ADNEXAL STRUCTURE Bilateral 6/22/2020    Procedure: SALPINGECTOMY;  Surgeon: Parul Cm MD;  [facility];  Service: Gynecology    CO LAP,RMV  ADNEXAL STRUCTURE Right 6/22/2020    Procedure: OOPHORECTOMY;  Surgeon: Parul Cm MD;  [facility];  Service: Gynecology    SKIN LESION EXCISION  2008    melanoma of back       Current Outpatient Medications   Medication Sig Dispense Refill    Ascorbic Acid (VITAMIN C) 500 MG CAPS Take by mouth daily       Calcium Carbonate-Vitamin D (CALCIUM PLUS VITAMIN D PO) Take by mouth daily       clonazePAM (KlonoPIN) 1 mg tablet Take 1 tablet (1 mg total) by mouth daily at bedtime 90 tablet 0    Multiple Vitamins-Minerals (WOMENS MULTIVITAMIN) TABS Take 1 tablet by mouth daily       norgestimate-ethinyl estradiol (ORTHO TRI-CYCLEN LO) 0 18/0 215/0 25 MG-25 MCG per tablet Take 1 tablet by mouth daily 84 tablet 3     No current facility-administered medications for this visit  Allergies   Allergen Reactions    Other Anaphylaxis     [facility] [id number]: [place] bug spray    Latex Rash    Tape [Medical Tape] Rash       Review of Systems    Video Exam    There were no vitals filed for this visit  Physical Exam   REVIEW OF SYSTEMS:    CONSTITUTIONAL: Denies any fever, chills, rigors, and weight loss  HEENT: No earache or tinnitus  Denies hearing loss or visual disturbances  CARDIOVASCULAR: No chest pain or palpitations  RESPIRATORY: Denies any cough, hemoptysis, shortness of breath or dyspnea on exertion  GASTROINTESTINAL: As noted in the History of Present Illness  GENITOURINARY: No problems with urination  Denies any hematuria or dysuria  NEUROLOGIC: No dizziness or vertigo, denies headaches  MUSCULOSKELETAL: Denies any muscle or joint pain  SKIN: Denies skin rashes or itching  ENDOCRINE: Denies excessive thirst  Denies intolerance to heat or cold  PSYCHOSOCIAL: Denies depression or anxiety   Denies any recent memory loss  PHYSICAL EXAMINATION:  Appearance and vitals taken from home devices    General Appearance:   Alert, cooperative, no distress   HEENT:  Normocephalic, atraumatic, anicteric  Neck supple, symmetrical, trachea midline  Lungs:   Equal chest rise and unlabored breathing, normal effort, no coughing  Cardiovascular:   No visualized JVD  Abdomen:   No abdominal distension  Skin:   No jaundice, rashes, or lesions  Musculoskeletal:   Normal range of motion visualized  Psych:  Normal affect and normal insight  Neuro:  Alert and appropriate  I spent 20 minutes directly with the patient during this visit      VIRTUAL VISIT DISCLAIMER    Shae Dunn acknowledges that she has consented to an online visit or consultation  She understands that the online visit is based solely on information provided by her, and that, in the absence of a face-to-face physical evaluation by the physician, the diagnosis she receives is both limited and provisional in terms of accuracy and completeness  This is not intended to replace a full medical face-to-face evaluation by the physician  Shae Dunn understands and accepts these terms

## 2021-02-24 ENCOUNTER — OFFICE VISIT (OUTPATIENT)
Dept: FAMILY MEDICINE CLINIC | Facility: CLINIC | Age: 41
End: 2021-02-24
Payer: COMMERCIAL

## 2021-02-24 VITALS
WEIGHT: 163 LBS | HEIGHT: 66 IN | TEMPERATURE: 97.6 F | DIASTOLIC BLOOD PRESSURE: 74 MMHG | BODY MASS INDEX: 26.2 KG/M2 | SYSTOLIC BLOOD PRESSURE: 122 MMHG

## 2021-02-24 DIAGNOSIS — K50.00 CROHN'S DISEASE OF SMALL INTESTINE WITHOUT COMPLICATION (HCC): Primary | ICD-10-CM

## 2021-02-24 DIAGNOSIS — G89.29 CHRONIC PAIN OF RIGHT KNEE: ICD-10-CM

## 2021-02-24 DIAGNOSIS — M25.561 CHRONIC PAIN OF RIGHT KNEE: ICD-10-CM

## 2021-02-24 PROBLEM — N60.02 CYST OF LEFT BREAST: Status: ACTIVE | Noted: 2021-02-24

## 2021-02-24 PROCEDURE — 99213 OFFICE O/P EST LOW 20 MIN: CPT | Performed by: FAMILY MEDICINE

## 2021-02-24 NOTE — PROGRESS NOTES
Assessment/Plan: the patient was brace and patient will continue with exercise for right knee pain  Patient will follow with specialist per routine  Patient will try CBD overall  Patient will try to figure out what the surgery patient had with right knee  Diagnoses and all orders for this visit:    Crohn's disease of small intestine without complication (HCC)    Chronic pain of right knee            Subjective:        Patient ID: Anup Michael is a 38 y o  female  Patient is here for multiple issues including chronic right knee pain as well as history of Crohn's disease  Patient also with recent complex breast cyst left breast noted on mammogram   Patient is asymptomatic  Patient has seen Dr Haroon French in the past   Patient will be going for follow-up ultrasound in 6 months  Patient did have video visit with GI  Patient will be having CT scan over the weekend with history of Crohn's disease  Patient was using will tearing gel with good results until skin eruption/ blistering is GERD  Some scarring noted  Patient has been limiting activity  Patient is using acetaminophen  The following portions of the patient's history were reviewed and updated as appropriate: allergies, current medications, past family history, past medical history, past social history, past surgical history and problem list       Review of Systems   Constitutional: Negative  HENT: Negative  Eyes: Negative  Respiratory: Negative  Cardiovascular: Negative  Gastrointestinal: Negative  Endocrine: Negative  Genitourinary: Negative  Musculoskeletal: Positive for arthralgias  Skin: Negative  Allergic/Immunologic: Negative  Neurological: Negative  Hematological: Negative  Psychiatric/Behavioral: Negative  Objective:      BMI Counseling: Body mass index is 26 71 kg/m²  The BMI is above normal  Nutrition recommendations include decreasing portion sizes   Exercise recommendations include moderate physical activity 150 minutes/week  /74 (BP Location: Right arm, Patient Position: Sitting, Cuff Size: Adult)   Temp 97 6 °F (36 4 °C) (Tympanic)   Ht 5' 5 5" (1 664 m)   Wt 73 9 kg (163 lb)   BMI 26 71 kg/m²          Physical Exam  Vitals signs and nursing note reviewed  Constitutional:       General: She is not in acute distress  Appearance: Normal appearance  She is not ill-appearing, toxic-appearing or diaphoretic  HENT:      Head: Normocephalic and atraumatic  Right Ear: Tympanic membrane, ear canal and external ear normal  There is no impacted cerumen  Left Ear: Tympanic membrane, ear canal and external ear normal  There is no impacted cerumen  Nose: Nose normal  No congestion or rhinorrhea  Mouth/Throat:      Mouth: Mucous membranes are moist       Pharynx: No oropharyngeal exudate or posterior oropharyngeal erythema  Eyes:      General: No scleral icterus  Right eye: No discharge  Left eye: No discharge  Extraocular Movements: Extraocular movements intact  Conjunctiva/sclera: Conjunctivae normal       Pupils: Pupils are equal, round, and reactive to light  Neck:      Musculoskeletal: Normal range of motion and neck supple  No neck rigidity or muscular tenderness  Vascular: No carotid bruit  Cardiovascular:      Rate and Rhythm: Normal rate and regular rhythm  Pulses: Normal pulses  Heart sounds: Normal heart sounds  No murmur  No friction rub  No gallop  Pulmonary:      Effort: Pulmonary effort is normal  No respiratory distress  Breath sounds: Normal breath sounds  No stridor  No wheezing, rhonchi or rales  Chest:      Chest wall: No tenderness  Musculoskeletal: Normal range of motion  General: No swelling, tenderness, deformity or signs of injury  Right lower leg: No edema  Left lower leg: No edema     Lymphadenopathy:      Cervical: No cervical adenopathy  Skin:     General: Skin is warm and dry  Capillary Refill: Capillary refill takes less than 2 seconds  Coloration: Skin is not jaundiced  Findings: No bruising, erythema, lesion or rash  Neurological:      General: No focal deficit present  Mental Status: She is alert and oriented to person, place, and time  Cranial Nerves: No cranial nerve deficit  Sensory: No sensory deficit  Motor: No weakness  Coordination: Coordination normal       Gait: Gait normal    Psychiatric:         Mood and Affect: Mood normal          Behavior: Behavior normal          Thought Content:  Thought content normal          Judgment: Judgment normal

## 2021-02-28 ENCOUNTER — HOSPITAL ENCOUNTER (OUTPATIENT)
Dept: CT IMAGING | Facility: HOSPITAL | Age: 41
Discharge: HOME | End: 2021-02-28
Attending: INTERNAL MEDICINE
Payer: COMMERCIAL

## 2021-02-28 DIAGNOSIS — K50.00 CROHN'S DISEASE OF SMALL INTESTINE WITHOUT COMPLICATION (HCC): ICD-10-CM

## 2021-02-28 PROCEDURE — 74177 CT ABD & PELVIS W/CONTRAST: CPT

## 2021-02-28 PROCEDURE — G1004 CDSM NDSC: HCPCS

## 2021-02-28 RX ADMIN — IOHEXOL 100 ML: 350 INJECTION, SOLUTION INTRAVENOUS at 13:27

## 2021-03-21 DIAGNOSIS — F51.01 PRIMARY INSOMNIA: ICD-10-CM

## 2021-03-22 RX ORDER — CLONAZEPAM 1 MG/1
1 TABLET ORAL
Qty: 90 TABLET | Refills: 0 | Status: SHIPPED | OUTPATIENT
Start: 2021-03-22 | End: 2021-06-22 | Stop reason: SDUPTHER

## 2021-03-27 ENCOUNTER — LAB (OUTPATIENT)
Dept: LAB | Facility: MEDICAL CENTER | Age: 41
End: 2021-03-27
Payer: COMMERCIAL

## 2021-03-27 DIAGNOSIS — K50.00 CROHN'S DISEASE OF SMALL INTESTINE WITHOUT COMPLICATION (HCC): ICD-10-CM

## 2021-03-27 PROCEDURE — 83993 ASSAY FOR CALPROTECTIN FECAL: CPT

## 2021-03-31 LAB — CALPROTECTIN STL-MCNT: 31 UG/G (ref 0–120)

## 2021-04-01 NOTE — RESULT ENCOUNTER NOTE
Thank you for completing the stool test  Your stool inflammation marker is normal  If you have any flares of symptoms in the future we will recheck this to compare to when you are feeling well

## 2021-05-05 ENCOUNTER — COSMETIC (OUTPATIENT)
Dept: PLASTIC SURGERY | Facility: CLINIC | Age: 41
End: 2021-05-05

## 2021-05-05 DIAGNOSIS — Z41.1 ENCOUNTER FOR COSMETIC PROCEDURE: ICD-10-CM

## 2021-05-05 PROCEDURE — VLMA1: Performed by: STUDENT IN AN ORGANIZED HEALTH CARE EDUCATION/TRAINING PROGRAM

## 2021-05-05 PROCEDURE — RECHECK: Performed by: STUDENT IN AN ORGANIZED HEALTH CARE EDUCATION/TRAINING PROGRAM

## 2021-05-05 NOTE — PROGRESS NOTES
PRS filler consult    First time?: yes  Allergies: latex and medical tape  Blood thinners: no  Pregnant: no     Patient has never had filler  She is interested in cheek filler  I discussed the various fillers and will proceed with 1 syringe of voluma  Risks and benefits discussed, patient agreed to proceed  0 5 cc of voluma per malar prominence     Total of 1 syringe of juvederm voluma     Patient tolerated well, will notify us if she wants more  1 syringe of voluma, 10% off  Follow-up PRN, instructed to ice and keep HOB elevated to decrease swelling and bruising          Domenic Lambert MD   [address] [place] [address] Plastic and Reconstructive Surgery   [address], [address]   [address]   Office: [phone]

## 2021-05-08 ENCOUNTER — TRANSCRIBE ORDERS (OUTPATIENT)
Dept: ADMINISTRATIVE | Facility: HOSPITAL | Age: 41
End: 2021-05-08

## 2021-05-08 ENCOUNTER — APPOINTMENT (OUTPATIENT)
Dept: LAB | Facility: CLINIC | Age: 41
End: 2021-05-08

## 2021-05-08 DIAGNOSIS — Z00.8 HEALTH EXAMINATION IN POPULATION SURVEY: Primary | ICD-10-CM

## 2021-05-08 DIAGNOSIS — Z00.8 HEALTH EXAMINATION IN POPULATION SURVEY: ICD-10-CM

## 2021-05-08 LAB
CHOLEST SERPL-MCNC: 170 MG/DL (ref 50–200)
EST. AVERAGE GLUCOSE BLD GHB EST-MCNC: 105 MG/DL
HBA1C MFR BLD: 5.3 %
HDLC SERPL-MCNC: 81 MG/DL
LDLC SERPL CALC-MCNC: 69 MG/DL (ref 0–100)
NONHDLC SERPL-MCNC: 89 MG/DL
TRIGL SERPL-MCNC: 102 MG/DL

## 2021-05-08 PROCEDURE — 36415 COLL VENOUS BLD VENIPUNCTURE: CPT

## 2021-05-08 PROCEDURE — 80061 LIPID PANEL: CPT

## 2021-05-08 PROCEDURE — 83036 HEMOGLOBIN GLYCOSYLATED A1C: CPT

## 2021-07-14 ENCOUNTER — HOSPITAL ENCOUNTER (OUTPATIENT)
Dept: ULTRASOUND IMAGING | Facility: CLINIC | Age: 41
Discharge: HOME | End: 2021-07-14
Payer: COMMERCIAL

## 2021-07-14 VITALS — BODY MASS INDEX: 24.43 KG/M2 | WEIGHT: 152 LBS | HEIGHT: 66 IN

## 2021-07-14 DIAGNOSIS — R92.8 ABNORMAL MAMMOGRAM: ICD-10-CM

## 2021-07-14 PROCEDURE — 76642 ULTRASOUND BREAST LIMITED: CPT

## 2021-07-30 ENCOUNTER — TELEPHONE (OUTPATIENT)
Dept: GASTROENTEROLOGY | Facility: MEDICAL CENTER | Age: 41
End: 2021-07-30

## 2021-07-30 NOTE — TELEPHONE ENCOUNTER
I called patient today however call went to voicemail  She was instructed to return call to the office when able

## 2021-08-25 ENCOUNTER — OFFICE VISIT (OUTPATIENT)
Dept: FAMILY MEDICINE CLINIC | Facility: CLINIC | Age: 41
End: 2021-08-25
Payer: COMMERCIAL

## 2021-08-25 VITALS
DIASTOLIC BLOOD PRESSURE: 76 MMHG | HEIGHT: 66 IN | BODY MASS INDEX: 24.59 KG/M2 | SYSTOLIC BLOOD PRESSURE: 130 MMHG | WEIGHT: 153 LBS

## 2021-08-25 DIAGNOSIS — G89.29 CHRONIC PAIN OF RIGHT KNEE: ICD-10-CM

## 2021-08-25 DIAGNOSIS — F41.1 GENERALIZED ANXIETY DISORDER: Primary | ICD-10-CM

## 2021-08-25 DIAGNOSIS — M25.561 CHRONIC PAIN OF RIGHT KNEE: ICD-10-CM

## 2021-08-25 DIAGNOSIS — E04.1 NONTOXIC SINGLE THYROID NODULE: ICD-10-CM

## 2021-08-25 PROCEDURE — 99214 OFFICE O/P EST MOD 30 MIN: CPT | Performed by: FAMILY MEDICINE

## 2021-08-25 NOTE — PROGRESS NOTES
Assessment/Plan:  Patient will continue with grief counselor  Patient have clonazepam refilled when needed  Knee pain has improved will observe  Patient go for ultrasound the thyroid  Patient follow-up in 6 months or as needed  Labs and records reviewed  Diagnoses and all orders for this visit:    Generalized anxiety disorder    Chronic pain of right knee    Nontoxic single thyroid nodule  -     US thyroid; Future            Subjective:        Patient ID: Tai Mckeon is a 42 y o  female  Patient follow-up on anxiety as well as chronic right knee pain  Patient is having grief counseling due to loss of mother  Patient also due for thyroid ultrasound at this time  The knee pain on the right has been slightly better since losing weight  Patient was having some panic attacks but these are decreased  The following portions of the patient's history were reviewed and updated as appropriate: allergies, current medications, past family history, past medical history, past social history, past surgical history and problem list       Review of Systems   Constitutional: Negative  HENT: Negative  Eyes: Negative  Respiratory: Negative  Cardiovascular: Negative  Gastrointestinal: Negative  Endocrine: Negative  Genitourinary: Negative  Musculoskeletal: Positive for arthralgias  Skin: Negative  Allergic/Immunologic: Negative  Neurological: Negative  Hematological: Negative  Psychiatric/Behavioral: The patient is nervous/anxious  Objective:        Depression Screening and Follow-up Plan: Clincally patient does not have depression  No treatment is required  /76 (BP Location: Right arm, Patient Position: Sitting, Cuff Size: Standard)   Ht 5' 5 5" (1 664 m)   Wt 69 4 kg (153 lb)   BMI 25 07 kg/m²          Physical Exam  Vitals and nursing note reviewed  Constitutional:       General: She is not in acute distress       Appearance: Normal appearance  She is not ill-appearing, toxic-appearing or diaphoretic  HENT:      Head: Normocephalic and atraumatic  Right Ear: Tympanic membrane, ear canal and external ear normal  There is no impacted cerumen  Left Ear: Tympanic membrane, ear canal and external ear normal  There is no impacted cerumen  Nose: Nose normal  No congestion or rhinorrhea  Mouth/Throat:      Mouth: Mucous membranes are moist       Pharynx: No oropharyngeal exudate or posterior oropharyngeal erythema  Eyes:      General: No scleral icterus  Right eye: No discharge  Left eye: No discharge  Extraocular Movements: Extraocular movements intact  Conjunctiva/sclera: Conjunctivae normal       Pupils: Pupils are equal, round, and reactive to light  Neck:      Vascular: No carotid bruit  Comments: Thyromegaly noted bilaterally  Cardiovascular:      Rate and Rhythm: Normal rate and regular rhythm  Pulses: Normal pulses  Heart sounds: Normal heart sounds  No murmur heard  No friction rub  No gallop  Pulmonary:      Effort: Pulmonary effort is normal  No respiratory distress  Breath sounds: Normal breath sounds  No stridor  No wheezing, rhonchi or rales  Chest:      Chest wall: No tenderness  Musculoskeletal:         General: No swelling, deformity or signs of injury  Cervical back: Normal range of motion and neck supple  No rigidity  No muscular tenderness  Right lower leg: No edema  Left lower leg: No edema  Lymphadenopathy:      Cervical: No cervical adenopathy  Skin:     General: Skin is warm and dry  Capillary Refill: Capillary refill takes less than 2 seconds  Coloration: Skin is not jaundiced  Findings: No bruising, erythema, lesion or rash  Neurological:      Mental Status: She is alert and oriented to person, place, and time  Mental status is at baseline  Cranial Nerves: No cranial nerve deficit        Sensory: No sensory deficit  Motor: No weakness  Coordination: Coordination normal       Gait: Gait normal    Psychiatric:         Mood and Affect: Mood normal          Behavior: Behavior normal          Thought Content:  Thought content normal          Judgment: Judgment normal

## 2021-09-07 PROCEDURE — 88305 TISSUE EXAM BY PATHOLOGIST: CPT | Performed by: PATHOLOGY

## 2021-09-08 ENCOUNTER — LAB REQUISITION (OUTPATIENT)
Dept: LAB | Facility: HOSPITAL | Age: 41
End: 2021-09-08
Payer: COMMERCIAL

## 2021-09-08 DIAGNOSIS — D48.5 NEOPLASM OF UNCERTAIN BEHAVIOR OF SKIN: ICD-10-CM

## 2021-10-04 ENCOUNTER — ANNUAL EXAM (OUTPATIENT)
Dept: OBGYN CLINIC | Facility: MEDICAL CENTER | Age: 41
End: 2021-10-04
Payer: COMMERCIAL

## 2021-10-04 VITALS
WEIGHT: 156 LBS | HEIGHT: 66 IN | BODY MASS INDEX: 25.07 KG/M2 | SYSTOLIC BLOOD PRESSURE: 120 MMHG | DIASTOLIC BLOOD PRESSURE: 80 MMHG

## 2021-10-04 DIAGNOSIS — Z12.31 ENCOUNTER FOR SCREENING MAMMOGRAM FOR MALIGNANT NEOPLASM OF BREAST: ICD-10-CM

## 2021-10-04 DIAGNOSIS — Z01.419 ENCOUNTER FOR WELL WOMAN EXAM WITH ROUTINE GYNECOLOGICAL EXAM: Primary | ICD-10-CM

## 2021-10-04 DIAGNOSIS — N80.9 ENDOMETRIOSIS: ICD-10-CM

## 2021-10-04 DIAGNOSIS — D23.9 APOCRINE CYSTADENOMA: ICD-10-CM

## 2021-10-04 DIAGNOSIS — Z12.11 COLON CANCER SCREENING: ICD-10-CM

## 2021-10-04 PROCEDURE — 99396 PREV VISIT EST AGE 40-64: CPT | Performed by: OBSTETRICS & GYNECOLOGY

## 2021-10-04 PROCEDURE — G0145 SCR C/V CYTO,THINLAYER,RESCR: HCPCS | Performed by: OBSTETRICS & GYNECOLOGY

## 2021-10-04 PROCEDURE — G0476 HPV COMBO ASSAY CA SCREEN: HCPCS | Performed by: OBSTETRICS & GYNECOLOGY

## 2021-10-06 LAB
HPV HR 12 DNA CVX QL NAA+PROBE: NEGATIVE
HPV16 DNA CVX QL NAA+PROBE: NEGATIVE
HPV18 DNA CVX QL NAA+PROBE: NEGATIVE

## 2021-10-06 RX ORDER — ACETAMINOPHEN AND CODEINE PHOSPHATE 120; 12 MG/5ML; MG/5ML
1 SOLUTION ORAL DAILY
Qty: 84 TABLET | Refills: 3 | Status: SHIPPED | OUTPATIENT
Start: 2021-10-06 | End: 2022-01-19 | Stop reason: ALTCHOICE

## 2021-10-08 LAB
LAB AP GYN PRIMARY INTERPRETATION: NORMAL
Lab: NORMAL

## 2021-10-19 ENCOUNTER — HOSPITAL ENCOUNTER (OUTPATIENT)
Dept: ULTRASOUND IMAGING | Facility: MEDICAL CENTER | Age: 41
Discharge: HOME | End: 2021-10-19
Payer: COMMERCIAL

## 2021-10-19 DIAGNOSIS — E04.1 NONTOXIC SINGLE THYROID NODULE: ICD-10-CM

## 2021-10-19 PROCEDURE — 76536 US EXAM OF HEAD AND NECK: CPT

## 2022-01-14 ENCOUNTER — HOSPITAL ENCOUNTER (OUTPATIENT)
Dept: MAMMOGRAPHY | Facility: MEDICAL CENTER | Age: 42
Discharge: HOME | End: 2022-01-14
Payer: COMMERCIAL

## 2022-01-14 VITALS — HEIGHT: 66 IN | WEIGHT: 156 LBS | BODY MASS INDEX: 25.07 KG/M2

## 2022-01-14 DIAGNOSIS — Z12.31 ENCOUNTER FOR SCREENING MAMMOGRAM FOR MALIGNANT NEOPLASM OF BREAST: ICD-10-CM

## 2022-01-14 PROCEDURE — 77063 BREAST TOMOSYNTHESIS BI: CPT

## 2022-01-14 PROCEDURE — 77067 SCR MAMMO BI INCL CAD: CPT

## 2022-01-19 ENCOUNTER — TELEPHONE (OUTPATIENT)
Dept: GASTROENTEROLOGY | Facility: AMBULARY SURGERY CENTER | Age: 42
End: 2022-01-19

## 2022-01-19 DIAGNOSIS — N80.9 ENDOMETRIOSIS: Primary | ICD-10-CM

## 2022-01-19 RX ORDER — NORGESTIMATE AND ETHINYL ESTRADIOL 7DAYSX3 LO
1 KIT ORAL DAILY
Qty: 84 TABLET | Refills: 3 | Status: SHIPPED | OUTPATIENT
Start: 2022-01-19

## 2022-01-21 ENCOUNTER — PREP FOR PROCEDURE (OUTPATIENT)
Dept: GASTROENTEROLOGY | Facility: CLINIC | Age: 42
End: 2022-01-21

## 2022-01-21 DIAGNOSIS — Z87.19 HISTORY OF CROHN'S DISEASE: Primary | ICD-10-CM

## 2022-02-25 ENCOUNTER — OFFICE VISIT (OUTPATIENT)
Dept: FAMILY MEDICINE CLINIC | Facility: CLINIC | Age: 42
End: 2022-02-25
Payer: COMMERCIAL

## 2022-02-25 VITALS
BODY MASS INDEX: 25.55 KG/M2 | HEART RATE: 74 BPM | OXYGEN SATURATION: 99 % | WEIGHT: 159 LBS | HEIGHT: 66 IN | DIASTOLIC BLOOD PRESSURE: 72 MMHG | TEMPERATURE: 97.8 F | SYSTOLIC BLOOD PRESSURE: 122 MMHG

## 2022-02-25 DIAGNOSIS — I73.00 RAYNAUD'S DISEASE WITHOUT GANGRENE: ICD-10-CM

## 2022-02-25 DIAGNOSIS — R00.2 PALPITATIONS: Primary | ICD-10-CM

## 2022-02-25 DIAGNOSIS — F41.0 PANIC DISORDER WITHOUT AGORAPHOBIA: ICD-10-CM

## 2022-02-25 PROCEDURE — 99214 OFFICE O/P EST MOD 30 MIN: CPT | Performed by: FAMILY MEDICINE

## 2022-02-25 NOTE — PROGRESS NOTES
Assessment/Plan:  Labs reviewed  From 2020  Patient will try to keep hands and toes warm regarding Raynaud's phenomenon  Patient go for laboratory studies for palpitations well as pain disorder  Patient use clonazepam sparingly as needed for anxiety and panic attacks  Patient may need to see Neurology in the future regarding Arnulfo's disease in the family  Diagnoses and all orders for this visit:    Palpitations  -     CBC and differential; Future  -     Comprehensive metabolic panel; Future  -     Lipid panel; Future  -     TSH, 3rd generation with Free T4 reflex; Future    Panic disorder without agoraphobia  -     CBC and differential; Future  -     Comprehensive metabolic panel; Future  -     Lipid panel; Future  -     TSH, 3rd generation with Free T4 reflex; Future    Raynaud's disease without gangrene  -     CBC and differential; Future  -     Comprehensive metabolic panel; Future  -     Lipid panel; Future  -     TSH, 3rd generation with Free T4 reflex; Future            Subjective:        Patient ID: Allyson Nam is a 42 y o  female  Patient is here to follow-up anxiety  Patient is having some increased panic attacks recently  Patient with suspected COVID infection in January  Patient is having some palpitations  No significant chest pain or shortness of breath  Patient also knows to color changes to toes consistent with Raynaud's  Patient using Klonopin for sleep  Patient with itchiness in legs bedtime  No rash  Mom passed away of the pulmonary embolism with history of Arnulfo disease  The following portions of the patient's history were reviewed and updated as appropriate: allergies, current medications, past family history, past medical history, past social history, past surgical history and problem list       Review of Systems   Constitutional: Negative  HENT: Negative  Eyes: Negative  Respiratory: Negative  Cardiovascular: Positive for palpitations  Gastrointestinal: Negative  Endocrine: Negative  Genitourinary: Negative  Musculoskeletal: Negative  Skin: Negative  Allergic/Immunologic: Negative  Neurological: Negative  Hematological: Negative  Psychiatric/Behavioral: Positive for sleep disturbance  The patient is nervous/anxious  Objective:      BMI Counseling: Body mass index is 26 06 kg/m²  The BMI is above normal  Nutrition recommendations include decreasing portion sizes  Exercise recommendations include moderate physical activity 150 minutes/week  Rationale for BMI follow-up plan is due to patient being overweight or obese  Depression Screening and Follow-up Plan: Patient was screened for depression during today's encounter  They screened negative with a PHQ-2 score of 0             /72 (BP Location: Right arm, Patient Position: Sitting, Cuff Size: Standard)   Pulse 74   Temp 97 8 °F (36 6 °C) (Tympanic)   Ht 5' 5 5" (1 664 m)   Wt 72 1 kg (159 lb)   SpO2 99%   BMI 26 06 kg/m²          Physical Exam  Vitals and nursing note reviewed  Constitutional:       General: She is not in acute distress  Appearance: Normal appearance  She is not ill-appearing, toxic-appearing or diaphoretic  HENT:      Head: Normocephalic and atraumatic  Right Ear: Tympanic membrane, ear canal and external ear normal  There is no impacted cerumen  Left Ear: Tympanic membrane, ear canal and external ear normal  There is no impacted cerumen  Nose: Nose normal  No congestion or rhinorrhea  Mouth/Throat:      Mouth: Mucous membranes are moist       Pharynx: No oropharyngeal exudate or posterior oropharyngeal erythema  Eyes:      General: No scleral icterus  Right eye: No discharge  Left eye: No discharge  Extraocular Movements: Extraocular movements intact  Conjunctiva/sclera: Conjunctivae normal       Pupils: Pupils are equal, round, and reactive to light     Neck:      Vascular: No carotid bruit  Cardiovascular:      Rate and Rhythm: Normal rate and regular rhythm  Pulses: Normal pulses  Heart sounds: Normal heart sounds  No murmur heard  No friction rub  No gallop  Pulmonary:      Effort: Pulmonary effort is normal  No respiratory distress  Breath sounds: Normal breath sounds  No stridor  No wheezing, rhonchi or rales  Chest:      Chest wall: No tenderness  Musculoskeletal:         General: No swelling, tenderness, deformity or signs of injury  Normal range of motion  Cervical back: Normal range of motion and neck supple  No rigidity  No muscular tenderness  Right lower leg: No edema  Left lower leg: No edema  Lymphadenopathy:      Cervical: No cervical adenopathy  Skin:     General: Skin is warm and dry  Capillary Refill: Capillary refill takes less than 2 seconds  Coloration: Skin is not jaundiced  Findings: No bruising, erythema, lesion or rash  Neurological:      Mental Status: She is alert and oriented to person, place, and time  Mental status is at baseline  Cranial Nerves: No cranial nerve deficit  Sensory: No sensory deficit  Motor: No weakness  Coordination: Coordination normal       Gait: Gait normal    Psychiatric:         Mood and Affect: Mood normal          Behavior: Behavior normal          Thought Content:  Thought content normal          Judgment: Judgment normal

## 2022-03-03 ENCOUNTER — APPOINTMENT (OUTPATIENT)
Dept: LAB | Facility: MEDICAL CENTER | Age: 42
End: 2022-03-03
Payer: COMMERCIAL

## 2022-03-03 DIAGNOSIS — F41.0 PANIC DISORDER WITHOUT AGORAPHOBIA: ICD-10-CM

## 2022-03-03 DIAGNOSIS — I73.00 RAYNAUD'S DISEASE WITHOUT GANGRENE: ICD-10-CM

## 2022-03-03 DIAGNOSIS — R00.2 PALPITATIONS: ICD-10-CM

## 2022-03-03 LAB
ALBUMIN SERPL BCP-MCNC: 3.4 G/DL (ref 3.5–5)
ALP SERPL-CCNC: 72 U/L (ref 46–116)
ALT SERPL W P-5'-P-CCNC: 19 U/L (ref 12–78)
ANION GAP SERPL CALCULATED.3IONS-SCNC: 8 MMOL/L (ref 4–13)
AST SERPL W P-5'-P-CCNC: 17 U/L (ref 5–45)
BASOPHILS # BLD AUTO: 0.02 THOUSANDS/ΜL (ref 0–0.1)
BASOPHILS NFR BLD AUTO: 0 % (ref 0–1)
BILIRUB SERPL-MCNC: 0.73 MG/DL (ref 0.2–1)
BUN SERPL-MCNC: 7 MG/DL (ref 5–25)
CALCIUM ALBUM COR SERPL-MCNC: 9.5 MG/DL (ref 8.3–10.1)
CALCIUM SERPL-MCNC: 9 MG/DL (ref 8.3–10.1)
CHLORIDE SERPL-SCNC: 107 MMOL/L (ref 100–108)
CHOLEST SERPL-MCNC: 189 MG/DL
CO2 SERPL-SCNC: 24 MMOL/L (ref 21–32)
CREAT SERPL-MCNC: 0.84 MG/DL (ref 0.6–1.3)
EOSINOPHIL # BLD AUTO: 0.14 THOUSAND/ΜL (ref 0–0.61)
EOSINOPHIL NFR BLD AUTO: 2 % (ref 0–6)
ERYTHROCYTE [DISTWIDTH] IN BLOOD BY AUTOMATED COUNT: 13 % (ref 11.6–15.1)
GFR SERPL CREATININE-BSD FRML MDRD: 86 ML/MIN/1.73SQ M
GLUCOSE P FAST SERPL-MCNC: 92 MG/DL (ref 65–99)
HCT VFR BLD AUTO: 41.4 % (ref 34.8–46.1)
HDLC SERPL-MCNC: 89 MG/DL
HGB BLD-MCNC: 13.3 G/DL (ref 11.5–15.4)
IMM GRANULOCYTES # BLD AUTO: 0.03 THOUSAND/UL (ref 0–0.2)
IMM GRANULOCYTES NFR BLD AUTO: 0 % (ref 0–2)
LDLC SERPL CALC-MCNC: 76 MG/DL (ref 0–100)
LYMPHOCYTES # BLD AUTO: 2.87 THOUSANDS/ΜL (ref 0.6–4.47)
LYMPHOCYTES NFR BLD AUTO: 40 % (ref 14–44)
MCH RBC QN AUTO: 29.8 PG (ref 26.8–34.3)
MCHC RBC AUTO-ENTMCNC: 32.1 G/DL (ref 31.4–37.4)
MCV RBC AUTO: 93 FL (ref 82–98)
MONOCYTES # BLD AUTO: 0.47 THOUSAND/ΜL (ref 0.17–1.22)
MONOCYTES NFR BLD AUTO: 7 % (ref 4–12)
NEUTROPHILS # BLD AUTO: 3.73 THOUSANDS/ΜL (ref 1.85–7.62)
NEUTS SEG NFR BLD AUTO: 51 % (ref 43–75)
NONHDLC SERPL-MCNC: 100 MG/DL
NRBC BLD AUTO-RTO: 0 /100 WBCS
PLATELET # BLD AUTO: 304 THOUSANDS/UL (ref 149–390)
PMV BLD AUTO: 9.8 FL (ref 8.9–12.7)
POTASSIUM SERPL-SCNC: 3.8 MMOL/L (ref 3.5–5.3)
PROT SERPL-MCNC: 7.9 G/DL (ref 6.4–8.2)
RBC # BLD AUTO: 4.47 MILLION/UL (ref 3.81–5.12)
SODIUM SERPL-SCNC: 139 MMOL/L (ref 136–145)
TRIGL SERPL-MCNC: 122 MG/DL
TSH SERPL DL<=0.05 MIU/L-ACNC: 0.99 UIU/ML (ref 0.36–3.74)
WBC # BLD AUTO: 7.26 THOUSAND/UL (ref 4.31–10.16)

## 2022-03-03 PROCEDURE — 80053 COMPREHEN METABOLIC PANEL: CPT

## 2022-03-03 PROCEDURE — 84443 ASSAY THYROID STIM HORMONE: CPT

## 2022-03-03 PROCEDURE — 85025 COMPLETE CBC W/AUTO DIFF WBC: CPT

## 2022-03-03 PROCEDURE — 80061 LIPID PANEL: CPT

## 2022-03-03 PROCEDURE — 36415 COLL VENOUS BLD VENIPUNCTURE: CPT

## 2022-05-18 RX ORDER — SODIUM CHLORIDE 9 MG/ML
125 INJECTION, SOLUTION INTRAVENOUS CONTINUOUS
Status: CANCELLED | OUTPATIENT
Start: 2022-05-18

## 2022-05-18 RX ORDER — ONDANSETRON 2 MG/ML
4 INJECTION INTRAMUSCULAR; INTRAVENOUS EVERY 6 HOURS PRN
Status: CANCELLED | OUTPATIENT
Start: 2022-05-18

## 2022-05-19 ENCOUNTER — HOSPITAL ENCOUNTER (OUTPATIENT)
Dept: GASTROENTEROLOGY | Facility: MEDICAL CENTER | Age: 42
Setting detail: OUTPATIENT SURGERY
Discharge: HOME | End: 2022-05-19
Admitting: INTERNAL MEDICINE
Payer: COMMERCIAL

## 2022-05-19 ENCOUNTER — ANESTHESIA EVENT (OUTPATIENT)
Dept: GASTROENTEROLOGY | Facility: MEDICAL CENTER | Age: 42
End: 2022-05-19

## 2022-05-19 ENCOUNTER — ANESTHESIA (OUTPATIENT)
Dept: GASTROENTEROLOGY | Facility: MEDICAL CENTER | Age: 42
End: 2022-05-19

## 2022-05-19 VITALS
OXYGEN SATURATION: 98 % | SYSTOLIC BLOOD PRESSURE: 113 MMHG | RESPIRATION RATE: 16 BRPM | HEART RATE: 96 BPM | TEMPERATURE: 98.9 F | DIASTOLIC BLOOD PRESSURE: 71 MMHG

## 2022-05-19 DIAGNOSIS — K80.20 CALCULUS OF GALLBLADDER WITHOUT CHOLECYSTITIS WITHOUT OBSTRUCTION: Primary | ICD-10-CM

## 2022-05-19 DIAGNOSIS — Z87.19 HISTORY OF CROHN'S DISEASE: ICD-10-CM

## 2022-05-19 DIAGNOSIS — K50.80 CROHN'S DISEASE OF BOTH SMALL AND LARGE INTESTINE WITHOUT COMPLICATION (HCC): ICD-10-CM

## 2022-05-19 PROCEDURE — 88305 TISSUE EXAM BY PATHOLOGIST: CPT | Performed by: PATHOLOGY

## 2022-05-19 PROCEDURE — 45380 COLONOSCOPY AND BIOPSY: CPT | Performed by: INTERNAL MEDICINE

## 2022-05-19 PROCEDURE — 43239 EGD BIOPSY SINGLE/MULTIPLE: CPT | Performed by: INTERNAL MEDICINE

## 2022-05-19 RX ORDER — PROPOFOL 10 MG/ML
INJECTION, EMULSION INTRAVENOUS AS NEEDED
Status: DISCONTINUED | OUTPATIENT
Start: 2022-05-19 | End: 2022-05-19

## 2022-05-19 RX ORDER — PROPOFOL 10 MG/ML
INJECTION, EMULSION INTRAVENOUS CONTINUOUS PRN
Status: DISCONTINUED | OUTPATIENT
Start: 2022-05-19 | End: 2022-05-19

## 2022-05-19 RX ORDER — SODIUM CHLORIDE 9 MG/ML
125 INJECTION, SOLUTION INTRAVENOUS CONTINUOUS
Status: DISCONTINUED | OUTPATIENT
Start: 2022-05-19 | End: 2022-05-23 | Stop reason: HOSPADM

## 2022-05-19 RX ADMIN — PROPOFOL 40 MG: 10 INJECTION, EMULSION INTRAVENOUS at 12:08

## 2022-05-19 RX ADMIN — PROPOFOL 140 MG: 10 INJECTION, EMULSION INTRAVENOUS at 12:05

## 2022-05-19 RX ADMIN — PROPOFOL 140 MCG/KG/MIN: 10 INJECTION, EMULSION INTRAVENOUS at 12:05

## 2022-05-19 RX ADMIN — PROPOFOL 30 MG: 10 INJECTION, EMULSION INTRAVENOUS at 12:11

## 2022-05-19 RX ADMIN — SODIUM CHLORIDE 125 ML/HR: 0.9 INJECTION, SOLUTION INTRAVENOUS at 11:09

## 2022-05-19 RX ADMIN — PROPOFOL 50 MG: 10 INJECTION, EMULSION INTRAVENOUS at 12:18

## 2022-05-19 RX ADMIN — SODIUM CHLORIDE: 0.9 INJECTION, SOLUTION INTRAVENOUS at 12:29

## 2022-05-19 NOTE — H&P
H&P EXAM - Outpatient Endoscopy   Laurie Plunkett 42 y o  female MRN: [id number]    [address] ENDOS[address]   Encounter: [id number]      History and Physical - [facility]  Laurie Plunkett 42 y o  female MRN: [id number]                  HPI: Laurie Plunkett is a 42y o  year old female who presents for crohn's disease and abdominal pain       REVIEW OF SYSTEMS: Per the HPI, and otherwise unremarkable      Historical Information   Past Medical History:   Diagnosis Date    Arthritis     assoc w Crohn's    Avascular necrosis ([address] )     right ankle, right hip, left elbow    Cancer ([address] ) 2008    melanoma    Crohn's disease ([address] )     Disease of thyroid gland 2003    during pregnancy    Endometriosis     Female infertility     GERD (gastroesophageal reflux disease)     Gestational diabetes mellitus     Gestational    Malignant melanoma [address] )     on back, had wide excision    Orbital myositis     Bilateral    PONV (postoperative nausea and vomiting)     Right ovarian cyst 2020    Thyroid disease     nodules     Past Surgical History:   Procedure Laterality Date    ABDOMINOPLASTY       SECTION      CYSTOSCOPY N/A 2020    Procedure: CYSTOSCOPY;  Surgeon: Dennise Hoffman MD;  [facility]: [facility];  Service: Gynecology   [facility]  2003    for endometriosis    DILATION AND CURETTAGE OF UTERUS      HERNIA REPAIR  2007    HYSTERECTOMY W/ SALPINGO-OOPHERECTOMY      KNEE SURGERY Right     torn meniscus    OOPHORECTOMY Right     2020    [place] LAP,DIAGNOSTIC ABDOMEN N/A 2020    Procedure: DX LAP;  Surgeon: Dennise Hoffman MD;  [facility];  Service: Gynecology    [place] LAP,FULGURATE/EXCISE LESIONS N/A 2020    Procedure: Juliet Marin;  Surgeon: Dennise Hoffman MD;  [facility];  Service: Gynecology    [place] LAP,RMV  ADNEXAL STRUCTURE Bilateral 2020 Procedure: SALPINGECTOMY;  Surgeon: Raiza Jefferson MD;  [facility];  Service: Gynecology    [place] LAP,RMV  ADNEXAL STRUCTURE Right 6/22/2020    Procedure: OOPHORECTOMY;  Surgeon: Raiza Jefferson MD;  [facility];  Service: Gynecology    SKIN LESION EXCISION  2008    melanoma of back     Social History   Social History     Substance and Sexual Activity   Alcohol Use Yes    Alcohol/week: 1 0 standard drink    Types: 1 Glasses of wine per week    Comment: Social use     Social History     Substance and Sexual Activity   Drug Use No     Social History     Tobacco Use   Smoking Status Never Smoker   Smokeless Tobacco Never Used     Family History   Problem Relation Age of Onset    Diabetes Mother     Hypertension Mother     Colon cancer Mother     Anxiety disorder Family     Coronary artery disease Family     Depression Family     Stroke Family         stroke syndrome    Colon cancer Paternal Grandmother 61    No Known Problems Daughter     Lung cancer Maternal Grandmother     No Known Problems Daughter     No Known Problems Paternal Aunt     No Known Problems Paternal [de-identified]     Breast cancer Half-Sister         around age 54    Hyperlipidemia Father     Hypertension Father        Meds/Allergies     (Not in a hospital admission)      Allergies   Allergen Reactions    Other Anaphylaxis     Annotation - [id number]: [place] bug spray    Latex Rash    Tape [Medical Tape] Rash       Objective     /89   Pulse 98   Temp 98 9 °F (37 2 °C) (Temporal)   Resp 16   SpO2 99%       PHYSICAL EXAM    Gen: NAD  CV: RRR  CHEST: Clear  ABD: soft, NT/ND  EXT: no edema      ASSESSMENT/PLAN:  This is a 42y o  year old female here for egd/ colonscopy, and she is stable and optimized for her procedure

## 2022-05-19 NOTE — ANESTHESIA PREPROCEDURE EVALUATION
Procedure:  COLONOSCOPY    Relevant Problems   ANESTHESIA (within normal limits)      CARDIO (within normal limits)   (+) Raynaud's disease without gangrene      NEURO/PSYCH   (+) Anxiety disorder   (+) Chronic left SI joint pain      Musculoskeletal and Integument   (+) Avascular bone necrosis (HCC)      Other   (+) Autoimmune disease (HCC)   (+) Crohn's disease (HCC)   (+) Endometriosis   (+) Malignant melanoma of back (HCC)   (+) Palpitations   (+) S/P right oophorectomy        Physical Exam    Airway    Mallampati score: II  TM Distance: >3 FB  Neck ROM: full     Dental   No notable dental hx     Cardiovascular  Rhythm: regular, Rate: normal, Cardiovascular exam normal    Pulmonary  Breath sounds clear to auscultation,     Other Findings        Anesthesia Plan  ASA Score- 2     Anesthesia Type- IV sedation with anesthesia with ASA Monitors  Additional Monitors:   Airway Plan:           Plan Factors-Exercise tolerance (METS): >4 METS  Chart reviewed  Existing labs reviewed  Patient summary reviewed  Patient is not a current smoker  Induction- intravenous  Postoperative Plan-     Informed Consent- Anesthetic plan and risks discussed with patient

## 2022-05-19 NOTE — ANESTHESIA POSTPROCEDURE EVALUATION
Post-Op Assessment Note    CV Status:  Stable  Pain Score: 0    Pain management: adequate     Mental Status:  Alert and awake   Hydration Status:  Euvolemic   PONV Controlled:  Controlled   Airway Patency:  Patent      Post Op Vitals Reviewed: Yes      Staff: Anesthesiologist         No complications documented      BP      Temp      Pulse     Resp      SpO2      /89   Pulse 98   Temp 98 9 °F (37 2 °C) (Temporal)   Resp 16   SpO2 99%

## 2022-05-27 DIAGNOSIS — F51.01 PRIMARY INSOMNIA: ICD-10-CM

## 2022-05-27 RX ORDER — CLONAZEPAM 1 MG/1
1 TABLET ORAL
Qty: 90 TABLET | Refills: 0 | Status: SHIPPED | OUTPATIENT
Start: 2022-05-27

## 2022-06-08 ENCOUNTER — CONSULT (OUTPATIENT)
Dept: SURGERY | Facility: CLINIC | Age: 42
End: 2022-06-08
Payer: COMMERCIAL

## 2022-06-08 VITALS
DIASTOLIC BLOOD PRESSURE: 84 MMHG | RESPIRATION RATE: 16 BRPM | WEIGHT: 154.2 LBS | HEART RATE: 89 BPM | BODY MASS INDEX: 24.78 KG/M2 | SYSTOLIC BLOOD PRESSURE: 121 MMHG | TEMPERATURE: 96.3 F | HEIGHT: 66 IN

## 2022-06-08 DIAGNOSIS — K80.20 CALCULUS OF GALLBLADDER WITHOUT CHOLECYSTITIS WITHOUT OBSTRUCTION: Primary | ICD-10-CM

## 2022-06-08 PROCEDURE — 99243 OFF/OP CNSLTJ NEW/EST LOW 30: CPT | Performed by: SURGERY

## 2022-06-08 RX ORDER — CEFAZOLIN SODIUM 1 G/50ML
1000 SOLUTION INTRAVENOUS ONCE
Status: CANCELLED | OUTPATIENT
Start: 2022-06-30 | End: 2022-06-30

## 2022-06-08 RX ORDER — SODIUM CHLORIDE, SODIUM LACTATE, POTASSIUM CHLORIDE, CALCIUM CHLORIDE 600; 310; 30; 20 MG/100ML; MG/100ML; MG/100ML; MG/100ML
125 INJECTION, SOLUTION INTRAVENOUS CONTINUOUS
Status: CANCELLED | OUTPATIENT
Start: 2022-06-30

## 2022-06-08 NOTE — ASSESSMENT & PLAN NOTE
I reviewed the CT scan and showed her the images  She has multiple large gallstones with some chronic inflammation  Explained to her that what her symptoms can be attributed to her gallbladder although slightly more atypical the less likely they can improve surgery  However with the Size and number ofstones inflammation I do believe she would benefit from cholecystectomy  The risks benefits alternatives were explained to her she is agreeable to proceed    We will therefore plan for a robotic cholecystectomy at [facility]

## 2022-06-08 NOTE — PROGRESS NOTES
Assessment/Plan:    Calculus of gallbladder without cholecystitis without obstruction   I reviewed the CT scan and showed her the images  She has multiple large gallstones with some chronic inflammation  Explained to her that what her symptoms can be attributed to her gallbladder although slightly more atypical the less likely they can improve surgery  However with the Size and number ofstones inflammation I do believe she would benefit from cholecystectomy  The risks benefits alternatives were explained to her she is agreeable to proceed  We will therefore plan for a robotic cholecystectomy at [facility]        Diagnoses and all orders for this visit:    Calculus of gallbladder without cholecystitis without obstruction  -     Ambulatory Referral to General Surgery  -     Case request operating room: CHOLECYSTECTOMY LAPAROSCOPIC W/ ROBOTICS; Standing  -     Basic metabolic panel; Future  -     HEMOGLOBIN A1C W/ EAG ESTIMATION; Future  -     EKG 12 lead; Future  -     Case request operating room: CHOLECYSTECTOMY LAPAROSCOPIC W/ ROBOTICS    Other orders  -     Diet NPO; Sips with meds; Standing  -     Apply Sequential Compression Device; Standing  -     Place sequential compression device; Standing  -     Reason for no Mechanical VTE Prophylaxis; Standing  -     lactated ringers infusion  -     ceFAZolin (ANCEF) 1,000 mg in dextrose 5 % 100 mL IVPB          Subjective:      Patient ID: Brigida Franklin is a 36 y o  female  Ms Mikey Marley  Is a 79-year-old female here for evaluation cholelithiasis  She has significant history Crohn's disease and she follows with Gastroenterology  On a CT scan in 2021 showed evidence of large gallstones  She states she was having some other abdominal issues and that being ovarian cyst that was removed about 1-2 years ago  Since then her symptoms became more prominent  She has some epigastric pain especially at night sometimes goes to her back    Some association with eating but difficult to assess as she is very careful to diet due to Crohn's disease  She sometimes has some belching with the abdominal pain  The following portions of the patient's history were reviewed and updated as appropriate: allergies, current medications, past family history, past medical history, past social history, past surgical history and problem list     Review of Systems   Constitutional: Negative for chills and fever  HENT: Negative for ear pain and sore throat  Eyes: Negative for pain and visual disturbance  Respiratory: Negative for cough and shortness of breath  Cardiovascular: Negative for chest pain and palpitations  Gastrointestinal: Positive for abdominal pain  Negative for vomiting  Genitourinary: Negative for dysuria and hematuria  Musculoskeletal: Negative for arthralgias and back pain  Skin: Negative for color change and rash  Neurological: Negative for seizures and syncope  Psychiatric/Behavioral: Negative for agitation and behavioral problems  All other systems reviewed and are negative  Objective:      /84   Pulse 89   Temp (!) 96 3 °F (35 7 °C)   Resp 16   Ht 5' 5 5" (1 664 m)   Wt 69 9 kg (154 lb 3 2 oz)   BMI 25 27 kg/m²          Physical Exam  Vitals and nursing note reviewed  Constitutional:       General: She is not in acute distress  Appearance: She is well-developed  She is not diaphoretic  HENT:      Head: Normocephalic and atraumatic  Eyes:      Pupils: Pupils are equal, round, and reactive to light  Cardiovascular:      Rate and Rhythm: Normal rate and regular rhythm  Heart sounds: Normal heart sounds  No murmur heard  Pulmonary:      Effort: Pulmonary effort is normal  No respiratory distress  Breath sounds: Normal breath sounds  No wheezing  Abdominal:      General: Bowel sounds are normal       Palpations: Abdomen is soft  Comments:   Laparoscopic scars    Some striae Musculoskeletal:         General: Normal range of motion  Cervical back: Normal range of motion and neck supple  Skin:     General: Skin is warm and dry  Neurological:      Mental Status: She is alert and oriented to person, place, and time     Psychiatric:         Behavior: Behavior normal

## 2022-06-08 NOTE — H&P (VIEW-ONLY)
Assessment/Plan:    Calculus of gallbladder without cholecystitis without obstruction   I reviewed the CT scan and showed her the images  She has multiple large gallstones with some chronic inflammation  Explained to her that what her symptoms can be attributed to her gallbladder although slightly more atypical the less likely they can improve surgery  However with the Size and number ofstones inflammation I do believe she would benefit from cholecystectomy  The risks benefits alternatives were explained to her she is agreeable to proceed  We will therefore plan for a robotic cholecystectomy at [facility]        Diagnoses and all orders for this visit:    Calculus of gallbladder without cholecystitis without obstruction  -     Ambulatory Referral to General Surgery  -     Case request operating room: CHOLECYSTECTOMY LAPAROSCOPIC W/ ROBOTICS; Standing  -     Basic metabolic panel; Future  -     HEMOGLOBIN A1C W/ EAG ESTIMATION; Future  -     EKG 12 lead; Future  -     Case request operating room: CHOLECYSTECTOMY LAPAROSCOPIC W/ ROBOTICS    Other orders  -     Diet NPO; Sips with meds; Standing  -     Apply Sequential Compression Device; Standing  -     Place sequential compression device; Standing  -     Reason for no Mechanical VTE Prophylaxis; Standing  -     lactated ringers infusion  -     ceFAZolin (ANCEF) 1,000 mg in dextrose 5 % 100 mL IVPB          Subjective:      Patient ID: Bernadine Montero is a 36 y o  female  Ms Rosa Art  Is a 23-year-old female here for evaluation cholelithiasis  She has significant history Crohn's disease and she follows with Gastroenterology  On a CT scan in 2021 showed evidence of large gallstones  She states she was having some other abdominal issues and that being ovarian cyst that was removed about 1-2 years ago  Since then her symptoms became more prominent  She has some epigastric pain especially at night sometimes goes to her back    Some association with eating but difficult to assess as she is very careful to diet due to Crohn's disease  She sometimes has some belching with the abdominal pain  The following portions of the patient's history were reviewed and updated as appropriate: allergies, current medications, past family history, past medical history, past social history, past surgical history and problem list     Review of Systems   Constitutional: Negative for chills and fever  HENT: Negative for ear pain and sore throat  Eyes: Negative for pain and visual disturbance  Respiratory: Negative for cough and shortness of breath  Cardiovascular: Negative for chest pain and palpitations  Gastrointestinal: Positive for abdominal pain  Negative for vomiting  Genitourinary: Negative for dysuria and hematuria  Musculoskeletal: Negative for arthralgias and back pain  Skin: Negative for color change and rash  Neurological: Negative for seizures and syncope  Psychiatric/Behavioral: Negative for agitation and behavioral problems  All other systems reviewed and are negative  Objective:      /84   Pulse 89   Temp (!) 96 3 °F (35 7 °C)   Resp 16   Ht 5' 5 5" (1 664 m)   Wt 69 9 kg (154 lb 3 2 oz)   BMI 25 27 kg/m²          Physical Exam  Vitals and nursing note reviewed  Constitutional:       General: She is not in acute distress  Appearance: She is well-developed  She is not diaphoretic  HENT:      Head: Normocephalic and atraumatic  Eyes:      Pupils: Pupils are equal, round, and reactive to light  Cardiovascular:      Rate and Rhythm: Normal rate and regular rhythm  Heart sounds: Normal heart sounds  No murmur heard  Pulmonary:      Effort: Pulmonary effort is normal  No respiratory distress  Breath sounds: Normal breath sounds  No wheezing  Abdominal:      General: Bowel sounds are normal       Palpations: Abdomen is soft  Comments:   Laparoscopic scars    Some striae Musculoskeletal:         General: Normal range of motion  Cervical back: Normal range of motion and neck supple  Skin:     General: Skin is warm and dry  Neurological:      Mental Status: She is alert and oriented to person, place, and time     Psychiatric:         Behavior: Behavior normal

## 2022-06-21 ENCOUNTER — CLINICAL SUPPORT (OUTPATIENT)
Dept: URGENT CARE | Facility: MEDICAL CENTER | Age: 42
End: 2022-06-21
Payer: COMMERCIAL

## 2022-06-21 DIAGNOSIS — K80.20 CALCULUS OF GALLBLADDER WITHOUT CHOLECYSTITIS WITHOUT OBSTRUCTION: Primary | ICD-10-CM

## 2022-06-21 LAB
ATRIAL RATE: 61 BPM
P AXIS: 61 DEGREES
PR INTERVAL: 164 MS
QRS AXIS: 65 DEGREES
QRSD INTERVAL: 84 MS
QT INTERVAL: 398 MS
QTC INTERVAL: 400 MS
T WAVE AXIS: 66 DEGREES
VENTRICULAR RATE: 61 BPM

## 2022-06-21 PROCEDURE — 93005 ELECTROCARDIOGRAM TRACING: CPT

## 2022-06-21 PROCEDURE — 93010 ELECTROCARDIOGRAM REPORT: CPT

## 2022-06-22 NOTE — PRE-PROCEDURE INSTRUCTIONS
Pre-Surgery Instructions:   Medication Instructions    clonazePAM (KlonoPIN) 1 mg tablet Take night before surgery    norgestimate-ethinyl estradiol (Tri-Lo-Sprintec) 0 18/0 215/0 25 MG-25 MCG per tablet Hold day of surgery  You will receive a phone call from hospital for arrival time  Please call surgeons office if any changes in your condition  Wear easy on/off clothing; consider type of surgery;  Valuables, jewelry, piercing's please keep at home  **COVID-19  education/surgical guidelines  Updated covid    Visitation policy  Please: No contact lenses or eye make up, artificial eyelashes    Please secure transportation     Follow pre surgery showering or cleaning instructions as  Reviewed by nurse or surgeons office      Questions answered and concerns addressed

## 2022-06-29 ENCOUNTER — ANESTHESIA EVENT (OUTPATIENT)
Dept: PERIOP | Facility: HOSPITAL | Age: 42
End: 2022-06-29
Payer: COMMERCIAL

## 2022-06-29 RX ORDER — INDOCYANINE GREEN AND WATER 25 MG
12.5 KIT INJECTION ONCE
Status: COMPLETED | OUTPATIENT
Start: 2022-06-29 | End: 2022-06-30

## 2022-06-30 ENCOUNTER — HOSPITAL ENCOUNTER (OUTPATIENT)
Facility: HOSPITAL | Age: 42
Setting detail: OUTPATIENT SURGERY
Discharge: HOME | End: 2022-06-30
Attending: SURGERY | Admitting: SURGERY
Payer: COMMERCIAL

## 2022-06-30 ENCOUNTER — ANESTHESIA (OUTPATIENT)
Dept: PERIOP | Facility: HOSPITAL | Age: 42
End: 2022-06-30
Payer: COMMERCIAL

## 2022-06-30 VITALS
DIASTOLIC BLOOD PRESSURE: 69 MMHG | TEMPERATURE: 98.6 F | RESPIRATION RATE: 14 BRPM | HEART RATE: 95 BPM | BODY MASS INDEX: 25.37 KG/M2 | SYSTOLIC BLOOD PRESSURE: 138 MMHG | HEIGHT: 66 IN | WEIGHT: 157.85 LBS | OXYGEN SATURATION: 98 %

## 2022-06-30 DIAGNOSIS — K80.20 CALCULUS OF GALLBLADDER WITHOUT CHOLECYSTITIS WITHOUT OBSTRUCTION: ICD-10-CM

## 2022-06-30 LAB
EXT PREGNANCY TEST URINE: NEGATIVE
EXT. CONTROL: NORMAL

## 2022-06-30 PROCEDURE — 81025 URINE PREGNANCY TEST: CPT | Performed by: ANESTHESIOLOGY

## 2022-06-30 PROCEDURE — 88304 TISSUE EXAM BY PATHOLOGIST: CPT | Performed by: PATHOLOGY

## 2022-06-30 PROCEDURE — 47562 LAPAROSCOPIC CHOLECYSTECTOMY: CPT | Performed by: PHYSICIAN ASSISTANT

## 2022-06-30 PROCEDURE — 47562 LAPAROSCOPIC CHOLECYSTECTOMY: CPT | Performed by: SURGERY

## 2022-06-30 PROCEDURE — S2900 ROBOTIC SURGICAL SYSTEM: HCPCS | Performed by: SURGERY

## 2022-06-30 RX ORDER — SODIUM CHLORIDE 9 MG/ML
125 INJECTION, SOLUTION INTRAVENOUS CONTINUOUS
Status: DISCONTINUED | OUTPATIENT
Start: 2022-06-30 | End: 2022-06-30 | Stop reason: HOSPADM

## 2022-06-30 RX ORDER — OXYCODONE HYDROCHLORIDE AND ACETAMINOPHEN 5; 325 MG/1; MG/1
1 TABLET ORAL EVERY 4 HOURS PRN
Status: DISCONTINUED | OUTPATIENT
Start: 2022-06-30 | End: 2022-06-30 | Stop reason: HOSPADM

## 2022-06-30 RX ORDER — GLYCOPYRROLATE 0.2 MG/ML
INJECTION INTRAMUSCULAR; INTRAVENOUS AS NEEDED
Status: DISCONTINUED | OUTPATIENT
Start: 2022-06-30 | End: 2022-06-30

## 2022-06-30 RX ORDER — FENTANYL CITRATE/PF 50 MCG/ML
50 SYRINGE (ML) INJECTION
Status: COMPLETED | OUTPATIENT
Start: 2022-06-30 | End: 2022-06-30

## 2022-06-30 RX ORDER — ONDANSETRON 2 MG/ML
INJECTION INTRAMUSCULAR; INTRAVENOUS AS NEEDED
Status: DISCONTINUED | OUTPATIENT
Start: 2022-06-30 | End: 2022-06-30

## 2022-06-30 RX ORDER — LIDOCAINE HYDROCHLORIDE 20 MG/ML
INJECTION, SOLUTION EPIDURAL; INFILTRATION; INTRACAUDAL; PERINEURAL AS NEEDED
Status: DISCONTINUED | OUTPATIENT
Start: 2022-06-30 | End: 2022-06-30

## 2022-06-30 RX ORDER — LABETALOL HYDROCHLORIDE 5 MG/ML
INJECTION, SOLUTION INTRAVENOUS AS NEEDED
Status: DISCONTINUED | OUTPATIENT
Start: 2022-06-30 | End: 2022-06-30

## 2022-06-30 RX ORDER — ROCURONIUM BROMIDE 10 MG/ML
INJECTION, SOLUTION INTRAVENOUS AS NEEDED
Status: DISCONTINUED | OUTPATIENT
Start: 2022-06-30 | End: 2022-06-30

## 2022-06-30 RX ORDER — FENTANYL CITRATE 50 UG/ML
INJECTION, SOLUTION INTRAMUSCULAR; INTRAVENOUS AS NEEDED
Status: DISCONTINUED | OUTPATIENT
Start: 2022-06-30 | End: 2022-06-30

## 2022-06-30 RX ORDER — MORPHINE SULFATE 10 MG/ML
2 INJECTION, SOLUTION INTRAMUSCULAR; INTRAVENOUS
Status: DISCONTINUED | OUTPATIENT
Start: 2022-06-30 | End: 2022-06-30 | Stop reason: HOSPADM

## 2022-06-30 RX ORDER — DEXAMETHASONE SODIUM PHOSPHATE 10 MG/ML
INJECTION, SOLUTION INTRAMUSCULAR; INTRAVENOUS AS NEEDED
Status: DISCONTINUED | OUTPATIENT
Start: 2022-06-30 | End: 2022-06-30

## 2022-06-30 RX ORDER — OXYCODONE HYDROCHLORIDE 5 MG/1
5 TABLET ORAL EVERY 4 HOURS PRN
Qty: 20 TABLET | Refills: 0 | Status: SHIPPED | OUTPATIENT
Start: 2022-06-30 | End: 2022-07-10

## 2022-06-30 RX ORDER — SODIUM CHLORIDE, SODIUM LACTATE, POTASSIUM CHLORIDE, CALCIUM CHLORIDE 600; 310; 30; 20 MG/100ML; MG/100ML; MG/100ML; MG/100ML
125 INJECTION, SOLUTION INTRAVENOUS CONTINUOUS
Status: DISCONTINUED | OUTPATIENT
Start: 2022-06-30 | End: 2022-06-30 | Stop reason: HOSPADM

## 2022-06-30 RX ORDER — ONDANSETRON 2 MG/ML
4 INJECTION INTRAMUSCULAR; INTRAVENOUS ONCE
Status: CANCELLED | OUTPATIENT
Start: 2022-06-30

## 2022-06-30 RX ORDER — BUPIVACAINE HYDROCHLORIDE 5 MG/ML
INJECTION, SOLUTION EPIDURAL; INTRACAUDAL AS NEEDED
Status: DISCONTINUED | OUTPATIENT
Start: 2022-06-30 | End: 2022-06-30 | Stop reason: HOSPADM

## 2022-06-30 RX ORDER — CEFAZOLIN SODIUM 1 G/50ML
1000 SOLUTION INTRAVENOUS ONCE
Status: COMPLETED | OUTPATIENT
Start: 2022-06-30 | End: 2022-06-30

## 2022-06-30 RX ORDER — MIDAZOLAM HYDROCHLORIDE 2 MG/2ML
INJECTION, SOLUTION INTRAMUSCULAR; INTRAVENOUS AS NEEDED
Status: DISCONTINUED | OUTPATIENT
Start: 2022-06-30 | End: 2022-06-30

## 2022-06-30 RX ORDER — HYDROMORPHONE HCL/PF 1 MG/ML
0.5 SYRINGE (ML) INJECTION
Status: DISCONTINUED | OUTPATIENT
Start: 2022-06-30 | End: 2022-06-30 | Stop reason: HOSPADM

## 2022-06-30 RX ORDER — MAGNESIUM HYDROXIDE 1200 MG/15ML
LIQUID ORAL AS NEEDED
Status: DISCONTINUED | OUTPATIENT
Start: 2022-06-30 | End: 2022-06-30 | Stop reason: HOSPADM

## 2022-06-30 RX ORDER — PROPOFOL 10 MG/ML
INJECTION, EMULSION INTRAVENOUS AS NEEDED
Status: DISCONTINUED | OUTPATIENT
Start: 2022-06-30 | End: 2022-06-30

## 2022-06-30 RX ORDER — ONDANSETRON 2 MG/ML
4 INJECTION INTRAMUSCULAR; INTRAVENOUS ONCE AS NEEDED
Status: COMPLETED | OUTPATIENT
Start: 2022-06-30 | End: 2022-06-30

## 2022-06-30 RX ORDER — NEOSTIGMINE METHYLSULFATE 1 MG/ML
INJECTION INTRAVENOUS AS NEEDED
Status: DISCONTINUED | OUTPATIENT
Start: 2022-06-30 | End: 2022-06-30

## 2022-06-30 RX ADMIN — HYDROMORPHONE HYDROCHLORIDE 0.5 MG: 1 INJECTION, SOLUTION INTRAMUSCULAR; INTRAVENOUS; SUBCUTANEOUS at 09:58

## 2022-06-30 RX ADMIN — INDOCYANINE GREEN AND WATER 12.5 MG: KIT at 07:24

## 2022-06-30 RX ADMIN — FENTANYL CITRATE 50 MCG: 50 INJECTION, SOLUTION INTRAMUSCULAR; INTRAVENOUS at 09:35

## 2022-06-30 RX ADMIN — HYDROMORPHONE HYDROCHLORIDE 0.5 MG: 1 INJECTION, SOLUTION INTRAMUSCULAR; INTRAVENOUS; SUBCUTANEOUS at 09:47

## 2022-06-30 RX ADMIN — FENTANYL CITRATE 50 MCG: 50 INJECTION, SOLUTION INTRAMUSCULAR; INTRAVENOUS at 09:28

## 2022-06-30 RX ADMIN — FENTANYL CITRATE 50 MCG: 50 INJECTION INTRAMUSCULAR; INTRAVENOUS at 09:01

## 2022-06-30 RX ADMIN — DEXAMETHASONE SODIUM PHOSPHATE 8 MG: 10 INJECTION INTRAMUSCULAR; INTRAVENOUS at 08:13

## 2022-06-30 RX ADMIN — FENTANYL CITRATE 50 MCG: 50 INJECTION INTRAMUSCULAR; INTRAVENOUS at 08:00

## 2022-06-30 RX ADMIN — FENTANYL CITRATE 100 MCG: 50 INJECTION INTRAMUSCULAR; INTRAVENOUS at 07:38

## 2022-06-30 RX ADMIN — FENTANYL CITRATE 50 MCG: 50 INJECTION, SOLUTION INTRAMUSCULAR; INTRAVENOUS at 09:22

## 2022-06-30 RX ADMIN — Medication 5 MG: at 08:28

## 2022-06-30 RX ADMIN — CEFAZOLIN SODIUM 1000 MG: 1 SOLUTION INTRAVENOUS at 07:28

## 2022-06-30 RX ADMIN — SODIUM CHLORIDE 125 ML/HR: 0.9 INJECTION, SOLUTION INTRAVENOUS at 06:00

## 2022-06-30 RX ADMIN — LIDOCAINE HYDROCHLORIDE 80 MG: 20 INJECTION, SOLUTION EPIDURAL; INFILTRATION; INTRACAUDAL; PERINEURAL at 07:38

## 2022-06-30 RX ADMIN — PROPOFOL 160 MG: 10 INJECTION, EMULSION INTRAVENOUS at 07:38

## 2022-06-30 RX ADMIN — NEOSTIGMINE METHYLSULFATE 4 MG: 1 INJECTION INTRAVENOUS at 08:38

## 2022-06-30 RX ADMIN — GLYCOPYRROLATE 0.8 MG: 0.2 INJECTION, SOLUTION INTRAMUSCULAR; INTRAVENOUS at 08:38

## 2022-06-30 RX ADMIN — MIDAZOLAM 2 MG: 1 INJECTION INTRAMUSCULAR; INTRAVENOUS at 07:29

## 2022-06-30 RX ADMIN — ONDANSETRON 4 MG: 2 INJECTION INTRAMUSCULAR; INTRAVENOUS at 11:08

## 2022-06-30 RX ADMIN — OXYCODONE AND ACETAMINOPHEN 1 TABLET: 5; 325 TABLET ORAL at 12:40

## 2022-06-30 RX ADMIN — SODIUM CHLORIDE, POTASSIUM CHLORIDE, SODIUM LACTATE AND CALCIUM CHLORIDE 125 ML/HR: 600; 310; 30; 20 INJECTION, SOLUTION INTRAVENOUS at 09:37

## 2022-06-30 RX ADMIN — FENTANYL CITRATE 50 MCG: 50 INJECTION, SOLUTION INTRAMUSCULAR; INTRAVENOUS at 09:16

## 2022-06-30 RX ADMIN — ROCURONIUM BROMIDE 50 MG: 50 INJECTION, SOLUTION INTRAVENOUS at 07:38

## 2022-06-30 RX ADMIN — SODIUM CHLORIDE, POTASSIUM CHLORIDE, SODIUM LACTATE AND CALCIUM CHLORIDE: 600; 310; 30; 20 INJECTION, SOLUTION INTRAVENOUS at 08:24

## 2022-06-30 RX ADMIN — ONDANSETRON 4 MG: 2 INJECTION INTRAMUSCULAR; INTRAVENOUS at 08:15

## 2022-06-30 RX ADMIN — TRIMETHOBENZAMIDE HYDROCHLORIDE 200 MG: 100 INJECTION INTRAMUSCULAR at 12:08

## 2022-06-30 NOTE — ANESTHESIA PREPROCEDURE EVALUATION
Procedure:  LAP KENIA W/ ROBOTICS (N/A Abdomen)    Relevant Problems   ANESTHESIA   (+) PONV (postoperative nausea and vomiting)      CARDIO (within normal limits)      NEURO/PSYCH   (+) Anxiety disorder   (+) Chronic left SI joint pain        Physical Exam    Airway    Mallampati score: II  TM Distance: >3 FB  Neck ROM: full     Dental   No notable dental hx     Cardiovascular  Rhythm: regular, Rate: normal, Cardiovascular exam normal    Pulmonary  Breath sounds clear to auscultation,     Other Findings        Anesthesia Plan  ASA Score- 2     Anesthesia Type- general with ASA Monitors  Additional Monitors:   Airway Plan: ETT  Comment: Had blurred vision after SCOP patch  Explained physiology of same  She does not wish to have one today  Awoke dysphoric after tube surgery          Plan Factors-Exercise tolerance (METS): >4 METS  Chart reviewed  Existing labs reviewed  Patient summary reviewed  Patient is not a current smoker  Patient not instructed to abstain from smoking on day of procedure  Patient did not smoke on day of surgery  Obstructive sleep apnea risk education given perioperatively  Induction- intravenous  Postoperative Plan-     Informed Consent- Anesthetic plan and risks discussed with patient and spouse (Will)

## 2022-06-30 NOTE — OP NOTE
OPERATIVE REPORT  PATIENT NAME: Velia Lofton    :  1980  MRN: [id number]  Pt Location: [facility] OR ROOM 08    SURGERY DATE: 2022    Surgeon(s) and Role:     * Cristiano Alvarenga MD - Primary     * Quinn Hatchet, PA-C - Assisting    Preop Diagnosis:  Calculus of gallbladder without cholecystitis without obstruction [K80 20]    Post-Op Diagnosis Codes:     * CCC (chronic calculous cholecystitis) [K80 10]    Procedure(s) (LRB):  LAP KENIA W/ ROBOTICS (N/A)    Specimen(s):  ID Type Source Tests Collected by Time Destination   1 : Gallbladder Tissue Gallbladder TISSUE EXAM Cristiano Alvarenga MD 2022 3652        Estimated Blood Loss:   Minimal    Drains:  NG/OG/Enteral Tube Orogastric 18 Fr Center mouth (Active)   Number of days: 0       Anesthesia Type:   General    Operative Indications:  Calculus of gallbladder without cholecystitis without obstruction [K80 20]      Operative Findings:  Several large stones with chronic inflammation    Complications:   None    Procedure and Technique:  The patient was seen again in the Holding Room  The risks, benefits, complications, treatment options, and expected outcomes were discussed with the patient  The possibilities of reaction to medication, pulmonary aspiration, perforation of viscus, bleeding, recurrent infection, finding a normal gallbladder, the need for additional procedures, failure to diagnose a condition, the possible need to convert to an open procedure, and creating a complication requiring transfusion or operation were discussed with the patient  The patient and/or family concurred with the proposed plan, giving informed consent  The site of surgery properly noted/marked  The patient was taken to Operating Room, identified as Velia Lofton  and the procedure verified as Laparoscopic Cholecystectomy with possible Intraoperative Cholangiogram  A Time Out was held after prepping and draping in sterile fashion    The above information was confirmed  Prior to the induction of general anesthesia, antibiotic prophylaxis was administered  General endotracheal anesthesia was then administered and tolerated well  After the induction, the abdomen was prepped in the usual sterile fashion  The patient was positioned in the supine position, along with some reverse Trendelenburg  Local anesthetic agent was injected into the skin near the umbilicus and an incision made  The midline fascia was incised and the open technique was used to introduce a  port under direct vision  Pneumoperitoneum was then created with CO2 and was tolerated well without any adverse changes in the patient's vital signs  Additional trocars were introduced under direct vision  All skin incisions were infiltrated with a local anesthetic agent before making the incision and placing the trocars  The patient was placed in reverse Trendelenburg position  The two  8 mm robotic trocars were placed at least 10 cm from the camera port  A 5 mm system port was placed on the lateral right side  The gallbladder was identified, the fundus grasped and retracted cephalad  Adhesions were lysed bluntly and with the electrocautery where indicated, taking care not to injure any adjacent organs or viscus  The infundibulum was grasped and retracted laterally, exposing the peritoneum overlying the triangle of Calot  This was then dissected anteriorily and posteriorly and exposed in a blunt fashion or using cautery where appropriate  The cystic duct was clearly identified and  dissected circumferentially, as was the cystic artery, as the only two tubular structures leading into the gallbladder   The critical view of the [address] was identified  The posterior aspect of the gallbladder was lifted off the cystic plate, to insure that there were no posterior structres behind the gallbladder        Once this was all clearly identified, the cystic duct was then doubly ligated with clips on the patient's side and 1 on the gallbladder side  The cystic artery was then also clipped and transected  The gallbladder was dissected from the liver bed in retrograde fashion with the electrocautery  The robot was then undocked and a 5 mm camera was placed back in  The gallbladder was placed into an endocatch bag and secured  The liver bed was irrigated and inspected  Hemostasis was achieved with the electrocautery  Copious irrigation was utilized and was repeatedly aspirated until clear  The camera was then switched to the lateral port and directed back to the midline  The specimen was removed under direct visualization from the midline incision  The fascia was then closed using the renfac suture passer and a figure of eight 0 vicryl suture  Pneumoperitoneum was completely reduced after viewing removal of the trocars under direct vision  The wound was thoroughly irrigated  The skin was then closed with 4-0 monocryl and histacryl  Instrument, sponge, and needle counts were correct at closure and at the conclusion of the case  PA is medically necessary for the surgical safety of the case, including suturing, retracting, and hemostasis       I was present for the entire procedure, A qualified resident physician was not available and A physician assistant was required during the procedure for retraction tissue handling,dissection and suturing    Patient Disposition:  PACU       SIGNATURE: Cristiano Alvarenga MD  DATE: June 30, 2022  TIME: 9:00 AM

## 2022-06-30 NOTE — ANESTHESIA POSTPROCEDURE EVALUATION
Post-Op Assessment Note    CV Status:  Stable    Pain management: adequate     Mental Status:  Alert and awake   Hydration Status:  Euvolemic   PONV Controlled:  Controlled   Airway Patency:  Patent      Post Op Vitals Reviewed: Yes      Staff: Anesthesiologist         No complications documented      BP      Temp      Pulse     Resp      SpO2      /80   Pulse 94   Temp 98 3 °F (36 8 °C)   Resp 18   Ht 5' 5 5" (1 664 m)   Wt 71 6 kg (157 lb 13 6 oz)   LMP 06/01/2022 (Exact Date)   SpO2 95%   BMI 25 87 kg/m²

## 2022-06-30 NOTE — INTERVAL H&P NOTE
H&P reviewed  After examining the patient I find no changes in the patients condition since the H&P had been written      Vitals:    06/30/22 0717   BP: 145/83   Pulse: 98   Resp: 16   Temp:    SpO2: 100%

## 2022-06-30 NOTE — DISCHARGE INSTRUCTIONS
[facility] Instructions     Dr Katina Madsen MD, FACS     [phone]          1  General: You will feel pulling sensations around the wound or funny aches and pains around the incisions  This is normal  Even minor surgery is a change in your body and this is your bodys way of reacting to it  If you have had abdominal surgery, it may help to support the incision with a small pillow or blanket for comfort when moving or coughing  2  Wound care:  Okay to shower  The glue will fall off over the next week or 2  Use ice for at least the 1st 48 hours  Do not use for longer than 20 minutes at a time  Use 3 times per day  3  Water: You may shower over the wounds  Do not bathe or use a pool or hot tub until cleared by the physician  If you were discharged with a drain, make sure drain site is covered with plastic wrap before showering  4  Activity: You may go up and down stairs, walk as much as you are comfortable, but walk at least 3 times each day  If you have had abdominal surgery, do not lift anything heavier than 20 pounds for at least 4 weeks  5  Diet: You may resume a regular diet  If you had a same-day surgery or overnight stay surgery, you may wish to eat lightly for a few days: soups, crackers, and sandwiches  You may resume a regular diet when ready  6  Medications: Resume all of your previous medications, unless told otherwise by the doctor  Avoid aspirin products for 2-3 days after the date of surgery  You may, at that time, began to take them again  Use Tylenol and Ibuprofen for pain control  You may alternate these medications every 3 hours  For example: you may take Tylenol at noon, Ibuprofen at 3:00 p m , and Tylenol again at 6:00 p m , etc  You should use ice to assist with pain control as above  You do not need to take narcotic pain medication unless you are having significant pain     If you were prescribed a narcotic pain medication containing Tylenol, such as Percocet or Norco, do not use supplemental Tylenol  7  Driving: You will need someone to drive you home on the day of surgery or discharge  Do not drive or make any important decisions while on narcotic pain medication or 24 hours and after anesthesia or sedation for surgery  Generally, you may drive when your off all narcotic pain medications and you are comfortable  8  Upset Stomach: You may take Maalox, Tums, or similar items for an upset stomach  If your narcotic pain medication causes an upset stomach, do not take it on an empty stomach  Try taking it with at least some crackers or toast       9  Constipation: Patients often experience constipation after surgery  You may take over-the-counter medication for this, such as Metamucil, Senokot, Dulcolax, milk of magnesia, etc  You may take a suppository unless you have had anorectal surgery such as a procedure on your hemorrhoids  If you experience significant nausea or vomiting after abdominal surgery, call the office before trying any of these medications  10  Call the office: If you are experiencing any of the following: fevers above 101 5°, significant nausea or vomiting, if the wound develops drainage and/or there is excessive redness around the wound, or if you have significant diarrhea or other worsening symptoms  11  Pain: You may be given a prescription for pain medication  This will be sent to your pharmacy prior to discharge  12  Sexual Activity: You may resume sexual activity when you feel ready and comfortable and your incision is sealed and healed without apparent infection risk  13  Urination: If you have not urinated in 6 hours, go directly to the ER for evaluation for urinary retention  14  Follow-up in 2 weeks

## 2022-07-12 ENCOUNTER — OFFICE VISIT (OUTPATIENT)
Dept: SURGERY | Facility: CLINIC | Age: 42
End: 2022-07-12

## 2022-07-12 VITALS
HEIGHT: 65 IN | RESPIRATION RATE: 16 BRPM | SYSTOLIC BLOOD PRESSURE: 113 MMHG | WEIGHT: 156.4 LBS | HEART RATE: 79 BPM | TEMPERATURE: 96.5 F | DIASTOLIC BLOOD PRESSURE: 71 MMHG | BODY MASS INDEX: 26.06 KG/M2

## 2022-07-12 DIAGNOSIS — Z90.49 S/P LAPAROSCOPIC CHOLECYSTECTOMY: Primary | ICD-10-CM

## 2022-07-12 PROCEDURE — 99024 POSTOP FOLLOW-UP VISIT: CPT | Performed by: PHYSICIAN ASSISTANT

## 2022-07-12 NOTE — PROGRESS NOTES
Assessment/Plan:   Trish Gupta is a 36 y  o female who comes in today for postoperative check after robotic laparoscopic cholecystectomy with Dr Kristopher Guerrero on 6/30/22  Patient is pleased with the outcome of surgery and is doing well  Pathology: Pathology is pending, we will call patient when results return     ______________________________________________________  HPI:  Trish Gupta is a 36 y  o female who comes in today for postoperative check after recent  robotic laparoscopic cholecystectomy with Dr Kristopher Guerrero on 6/30/22  Currently doing well without problems, no fever or chills,no nausea and no vomiting  Patient reports they have resumed their normal diet  denies biliary diarrhea  Reports no issues  ROS:  General ROS: negative for - chills, fatigue, fever or night sweats, weight loss  Respiratory ROS: no cough, shortness of breath, or wheezing  Cardiovascular ROS: no chest pain or dyspnea on exertion  Genito-Urinary ROS: no dysuria, trouble voiding, or hematuria  Musculoskeletal ROS: negative for - gait disturbance, joint pain or muscle pain  Neurological ROS: no TIA or stroke symptoms  GI ROS: see HPI  Skin ROS: no new rashes or lesions   Lymphatic ROS: no new adenopathy noted by pt     Psy ROS: no new mental or behavioral disturbances       Patient Active Problem List   Diagnosis    Anxiety disorder    Avascular bone necrosis (HCC)    [facility]immune disease ([facility]    Varicose veins    Nontoxic single thyroid nodule    Pulsatile tinnitus of left ear    Pulsatile tinnitus of right ear    Malignant melanoma of back ([facility]    Crohn's disease ([facility]    Endometriosis    Wellness examination    Chronic left SI joint pain    Request for sterilization    Status post bilateral salpingectomy    S/P right oophorectomy    Chronic pain of right knee    Cyst of left breast    Palpitations    Raynaud's disease without gangrene    Calculus of gallbladder without cholecystitis without obstruction    PONV (postoperative nausea and vomiting)           Allergies:   Other, Scopolamine, Latex, and Tape [medical tape]      Current Outpatient Medications:     Ascorbic Acid (VITAMIN C) 500 MG CAPS, Take by mouth daily , Disp: , Rfl:     Calcium Carbonate-Vitamin D (CALCIUM PLUS VITAMIN D PO), Take by mouth daily , Disp: , Rfl:     clonazePAM (KlonoPIN) 1 mg tablet, Take 1 tablet (1 mg total) by mouth daily at bedtime (Patient taking differently: Take 1 mg by mouth daily at bedtime as needed), Disp: 90 tablet, Rfl: 0    Multiple Vitamins-Minerals (WOMENS MULTIVITAMIN) TABS, Take 1 tablet by mouth daily , Disp: , Rfl:     norgestimate-ethinyl estradiol (Tri-Lo-Sprintec) 0 18/0 215/0 25 MG-25 MCG per tablet, Take 1 tablet by mouth daily, Disp: 84 tablet, Rfl: 3    Past Medical History:   Diagnosis Date    Arthritis     assoc w Crohn's    Avascular necrosis ([address] )     right ankle, right hip, left elbow    Cancer ([address] )     melanoma    Crohn's disease ([address] )     Disease of thyroid gland     during pregnancy    Endometriosis     Female infertility     GERD (gastroesophageal reflux disease)     Gestational diabetes mellitus     Gestational    Malignant melanoma ([address] )     on back, had wide excision    Orbital myositis     Bilateral    PONV (postoperative nausea and vomiting)     Right ovarian cyst 2020    Thyroid disease     nodules       Past Surgical History:   Procedure Laterality Date    ABDOMINOPLASTY       SECTION      CYSTOSCOPY N/A 2020    Procedure: CYSTOSCOPY;  Surgeon: Youlanda Koyanagi, MD;  [facility];  Service: Gynecology    DENTAL SURGERY      DIAGNOSTIC LAPAROSCOPY  2003    for endometriosis    DILATION AND CURETTAGE OF UTERUS      EGD      HERNIA REPAIR      KNEE SURGERY Right     torn meniscus    OOPHORECTOMY Right     2020    MA LAP,CHOLECYSTECTOMY N/A 2022    Procedure: LAP KENIA W/ ROBOTICS;  Surgeon: Maribel Tee MD;  [facility];  Service: General    GA LAP,DIAGNOSTIC ABDOMEN N/A 06/22/2020    Procedure: DX LAP;  Surgeon: Severo Russell MD;  [facility];  Service: Gynecology    GA LAP,FULGURATE/EXCISE LESIONS N/A 06/22/2020    Procedure: Kanika Lever;  Surgeon: Severo Russell MD;  [facility];  Service: Gynecology    GA LAP,RMV  ADNEXAL STRUCTURE Bilateral 06/22/2020    Procedure: SALPINGECTOMY;  Surgeon: Severo Russell MD;  [facility];  Service: Gynecology    GA LAP,RMV  ADNEXAL STRUCTURE Right 06/22/2020    Procedure: OOPHORECTOMY;  Surgeon: Severo Russell MD;  [facility];  Service: Gynecology    SALPINGECTOMY Bilateral     SKIN LESION EXCISION  2008    melanoma of back       Family History   Problem Relation Age of Onset    Diabetes Mother     Hypertension Mother     Colon cancer Mother     Pulmonary embolism Mother     Hyperlipidemia Father     Hypertension Father     No Known Problems Daughter     No Known Problems Daughter     Lung cancer Maternal Grandmother     Colon cancer Paternal Grandmother 61    No Known Problems Paternal Aunt     No Known Problems Paternal Aunt     Anxiety disorder Family     Coronary artery disease Family     Depression Family     Stroke Family         stroke syndrome    Breast cancer Half-Sister         around age 54        reports that she has never smoked  She has never used smokeless tobacco  She reports current alcohol use of about 1 0 standard drink of alcohol per week  She reports that she does not use drugs      Vitals:    07/12/22 1051   BP: 113/71   Pulse: 79   Resp: 16   Temp: (!) 96 5 °F (35 8 °C)       PHYSICAL EXAM  General: normal, cooperative, no distress  Abdominal: soft, nondistended or nontender  Incision: clean, dry, and intact and healing well        Vero Kahn PA-C      Date: 7/12/2022 Time: 1:10 PM

## 2022-08-30 ENCOUNTER — OFFICE VISIT (OUTPATIENT)
Dept: FAMILY MEDICINE CLINIC | Facility: CLINIC | Age: 42
End: 2022-08-30
Payer: COMMERCIAL

## 2022-08-30 VITALS
DIASTOLIC BLOOD PRESSURE: 86 MMHG | HEIGHT: 65 IN | WEIGHT: 157.2 LBS | SYSTOLIC BLOOD PRESSURE: 126 MMHG | TEMPERATURE: 98.9 F | HEART RATE: 68 BPM | OXYGEN SATURATION: 98 % | BODY MASS INDEX: 26.19 KG/M2

## 2022-08-30 DIAGNOSIS — K50.00 CROHN'S DISEASE OF SMALL INTESTINE WITHOUT COMPLICATION (HCC): ICD-10-CM

## 2022-08-30 DIAGNOSIS — R00.2 PALPITATIONS: ICD-10-CM

## 2022-08-30 DIAGNOSIS — F41.9 ANXIETY DISORDER, UNSPECIFIED TYPE: ICD-10-CM

## 2022-08-30 DIAGNOSIS — E04.1 NONTOXIC SINGLE THYROID NODULE: Primary | ICD-10-CM

## 2022-08-30 PROCEDURE — 99214 OFFICE O/P EST MOD 30 MIN: CPT | Performed by: FAMILY MEDICINE

## 2022-08-30 NOTE — PROGRESS NOTES
Assessment/Plan:  A1c 5 2  GFR 86 TSH 0 9 LDL 76 CMP CBC reviewed  Patient will follow with Dermatology yearly as directed  Patient go for ultrasound of thyroid nodule  Patient will have laboratory studies done also for conditions listed below  Patient will continue with clonazepam as needed for anxiety  /Panic attacks  Refills will be given when needed  Crohn's disease stable  Patient will follow with GI appropriately  Follow-up in 6 months  Diagnoses and all orders for this visit:    Nontoxic single thyroid nodule  -     US thyroid; Future  -     CBC and differential; Future  -     Comprehensive metabolic panel; Future  -     Lipid panel; Future  -     TSH, 3rd generation with Free T4 reflex; Future    Crohn's disease of small intestine without complication (HCC)  -     CBC and differential; Future  -     Comprehensive metabolic panel; Future  -     Lipid panel; Future  -     TSH, 3rd generation with Free T4 reflex; Future    Palpitations  -     CBC and differential; Future  -     Comprehensive metabolic panel; Future  -     Lipid panel; Future  -     TSH, 3rd generation with Free T4 reflex; Future    Anxiety disorder, unspecified type  -     CBC and differential; Future  -     Comprehensive metabolic panel; Future  -     Lipid panel; Future  -     TSH, 3rd generation with Free T4 reflex; Future            Subjective:        Patient ID: Donny Bryant is a 36 y o  female  Patient is here to follow-up on thyroid nodule as well as history of Crohn's disease and palpitations  Patient is to get some palpitations intermittently  Patient with increased anxiety  Patient did start therapy  Again  Joint stable overall  Crohn's stable overall  Patient with increased stress related to father with had syncopal events          The following portions of the patient's history were reviewed and updated as appropriate: allergies, current medications, past family history, past medical history, past social history, past surgical history and problem list       Review of Systems   Constitutional: Negative  HENT: Negative  Eyes: Negative  Respiratory: Negative  Cardiovascular: Positive for palpitations  Gastrointestinal: Negative  Endocrine: Negative  Genitourinary: Negative  Musculoskeletal: Negative  Skin: Negative  Allergic/Immunologic: Negative  Neurological: Negative  Hematological: Negative  Psychiatric/Behavioral: The patient is nervous/anxious  Objective:               /86 (BP Location: Right arm, Patient Position: Sitting, Cuff Size: Standard)   Pulse 68   Temp 98 9 °F (37 2 °C) (Temporal)   Ht 5' 5" (1 651 m)   Wt 71 3 kg (157 lb 3 2 oz)   SpO2 98%   BMI 26 16 kg/m²          Physical Exam  Vitals and nursing note reviewed  Constitutional:       General: She is not in acute distress  Appearance: Normal appearance  She is not ill-appearing, toxic-appearing or diaphoretic  HENT:      Head: Normocephalic and atraumatic  Right Ear: Tympanic membrane, ear canal and external ear normal  There is no impacted cerumen  Left Ear: Tympanic membrane, ear canal and external ear normal  There is no impacted cerumen  Nose: Nose normal  No congestion or rhinorrhea  Mouth/Throat:      Mouth: Mucous membranes are moist       Pharynx: No oropharyngeal exudate or posterior oropharyngeal erythema  Eyes:      General: No scleral icterus  Right eye: No discharge  Left eye: No discharge  Extraocular Movements: Extraocular movements intact  Conjunctiva/sclera: Conjunctivae normal       Pupils: Pupils are equal, round, and reactive to light  Neck:      Vascular: No carotid bruit  Cardiovascular:      Rate and Rhythm: Normal rate and regular rhythm  Pulses: Normal pulses  Heart sounds: Normal heart sounds  No murmur heard  No friction rub  No gallop     Pulmonary:      Effort: Pulmonary effort is normal  No respiratory distress  Breath sounds: Normal breath sounds  No stridor  No wheezing, rhonchi or rales  Chest:      Chest wall: No tenderness  Musculoskeletal:         General: No swelling, tenderness, deformity or signs of injury  Normal range of motion  Cervical back: Normal range of motion and neck supple  No rigidity  No muscular tenderness  Right lower leg: No edema  Left lower leg: No edema  Lymphadenopathy:      Cervical: No cervical adenopathy  Skin:     General: Skin is warm and dry  Capillary Refill: Capillary refill takes less than 2 seconds  Coloration: Skin is not jaundiced  Findings: No bruising, erythema, lesion or rash  Neurological:      Mental Status: She is alert and oriented to person, place, and time  Mental status is at baseline  Cranial Nerves: No cranial nerve deficit  Sensory: No sensory deficit  Motor: No weakness  Coordination: Coordination normal       Gait: Gait normal    Psychiatric:         Mood and Affect: Mood normal          Behavior: Behavior normal          Thought Content:  Thought content normal          Judgment: Judgment normal

## 2022-09-26 ENCOUNTER — AMB VIDEO VISIT (OUTPATIENT)
Dept: OTHER | Facility: HOSPITAL | Age: 42
End: 2022-09-26
Payer: COMMERCIAL

## 2022-09-26 VITALS — TEMPERATURE: 100.1 F | RESPIRATION RATE: 16 BRPM

## 2022-09-26 DIAGNOSIS — U07.1 COVID: Primary | ICD-10-CM

## 2022-09-26 PROCEDURE — 99212 OFFICE O/P EST SF 10 MIN: CPT | Performed by: NURSE PRACTITIONER

## 2022-09-26 NOTE — PROGRESS NOTES
Video Visit - Gordon Mayo 36 y o  female MRN: [id number]    REQUIRED DOCUMENTATION:         1  This service was provided via [facility]  2  Provider located at [address] [place] [id number]  3  [facility] provider: CHRISTEL Ball  4  Identify all parties in room with patient during [facility] visit:  patient   5  After connecting through [employer], patient was identified by name and date of birth  Patient was then informed that this was a Telemedicine visit and that the exam was being conducted confidentially over secure lines  My office door was closed  No one else was in the room  Patient acknowledged consent and understanding of privacy and security of the Telemedicine visit  I informed the patient that I have reviewed their record in Epic and presented the opportunity for them to ask any questions regarding the visit today  The patient agreed to participate  This is a 36year old female here today for video visit  She states yesterday she started to feel unwell  She developed fever, sore throat and congestion  She had fever of 102 over night  She has a positive covid test and fiance is also positive  She is vaccinated  This is first time she has tested positive for covid  NO chest pain or sob  No loss of taste but change in smell  She works from home but would like a few days of work  No interested in paxlovid at [facility] time  Review of Systems   Constitutional: Positive for activity change, chills, fatigue and fever  HENT: Positive for congestion, rhinorrhea, sinus pressure and sinus pain  Respiratory: Positive for cough  Negative for shortness of breath  Cardiovascular: Negative  Musculoskeletal: Negative  Skin: Negative  Neurological: Negative  Vitals:    09/26/22 1150   Resp: 16   Temp: 100 1 °F (37 8 °C)     Physical Exam  Constitutional:       General: She is not in acute distress  Appearance: Normal appearance   She is not ill-appearing or toxic-appearing  HENT:      Head: Normocephalic and atraumatic  Eyes:      Conjunctiva/sclera: Conjunctivae normal    Pulmonary:      Effort: Pulmonary effort is normal  No respiratory distress  Comments: No cough or audible wheezing  Able to speak in full sentences     Skin:     Comments: No rash on head or neck  Neurological:      Mental Status: She is alert and oriented to person, place, and time  Psychiatric:         Mood and Affect: Mood normal          Behavior: Behavior normal          Thought Content: Thought content normal          Judgment: Judgment normal        Diagnoses and all orders for this visit:    COVID      Patient Instructions          Home test positive for covid  Rest and drink extra fluids  Tylenol as needed for pain  Go to ER with any worsening symptoms, chest pain, sob, difficulty breathing, lethargy, confusion, dehyrdration, persistent fever 103 or higher  Not interested in paxlovid at this time  Birth control is contraindicated  Please Be Courteous:  You are being tested for novel coronavirus (COVID-19) your test is pending at this time  You need to self quarantine at least until you have the results back  This means go home and stay home  Have someone else  your medications for you and bring them to you and drop them off at your door  Tests typically come back in 1-3 days  Results can be found in the "COVID-19" section of your MyChart account  In Your Home:  If you live with other people, trying to avoid common spaces and disinfect areas that you come into contact with  Per the CDC's recommendations: persons who suspect that they might have COVID-19 should isolate, stay at home, and use a separate bedroom and bathroom if feasible  Isolation should begin even before seeking testing and before test results become available   All household members should start wearing a mask in the home, particularly in shared spaces where appropriate distancing is not possible  Take Care of Yourself:  Try to sleep on your stomach as much as possible  If you have the ability to, take vitamin D3 2000 IU by mouth daily, vitamin-C 1000 mg by mouth twice a day, a multivitamin daily to help boost your immune system  You should check your temperature twice day  Return to the emergency department for any severe shortness of breath or pulse ox less than 90%  If You Test Positive for COVID-19 (Isolate)     Everyone, regardless of vaccination status:     · Stay home for 5 days  · If you have no symptoms or your symptoms are resolving after 5 days, you can leave your house  · Continue to wear a mask around others for 5 additional days  If you have a fever, continue to stay home until your fever resolves  If You Were Exposed to Someone with COVID-19 (Quarantine)  If you:  Have been boosted  OR  Completed the primary series of Pfizer or Moderna vaccine within the last 6 months  OR  Completed the primary series of J&J vaccine within the last 2 months     · Wear a mask around others for 10 days  · Test on day 5, if possible  If you develop symptoms get a test and stay home  If you:  Completed the primary series of Pfizer or Moderna vaccine over 6 months ago and are not boosted  OR  Completed the primary series of J&J over 2 months ago and are not boosted  OR  Are unvaccinated     · Stay home for 5 days  After that continue to wear a mask around others for 5 additional days  · If you cant quarantine you must wear a mask for 10 days  · Test on day 5 if possible  If you develop symptoms get a test and stay home           Follow up with PCP if not improved, if symptoms are worse, go to the ER

## 2022-09-26 NOTE — LETTER
September 26, 2022    Patient: Arlette Oliveira  YOB: 1980  Date of Last Encounter: Visit date not found      To whom it may concern:     Arlette Oliveira has tested positive for COVID-19 (Coronavirus)   She may return to work on 09/28/2022 as long as fever and symptoms are improving,    Sincerely,         CHRISTEL Duarte

## 2022-09-26 NOTE — CARE ANYWHERE EVISITS
Visit Summary for ARLEN B  REJIANNEL - Gender: Female - Date of Birth: [id number]  Date: [id number] - Duration: 11 minutes  Patient: Denis FOWLER  Provider: Rachel KEARNEY    Patient Contact Information  Address  [address] [place] [address]; [address] [facility]  [phone]    Visit Topics  Covid [Added ByPastor Adan - 2022-09-26]    Triage Questions   What is your current physical address in the event of a medical emergency? Answer []  Are you allergic to any medications? Answer []  Are you now or could you be pregnant? Answer []  Do you have any immune system compromise or chronic lung   disease? Answer []  Do you have any vulnerable family members in the home (infant, pregnant, cancer, elderly)? Answer []     Conversation Transcripts  [0A][0A] [Notification] You are connected with Gurjit Sivla, Urgent Care Specialist [0A][Notification] ARLEN POST  Alexis Choudhary is located in [place]  [0A][Notification] ARLEN Choudhary has shared health history  Sandra Alicia  [0A]    Diagnosis  COVID-19    Procedures  Value: [id number] Code: CPT-4 UNLISTED E&M SERVICE    Medications Prescribed    No prescriptions ordered    Electronically signed by: Gurjit Marquis(NPI [id number])

## 2022-09-26 NOTE — PATIENT INSTRUCTIONS
Home test positive for covid  Rest and drink extra fluids  Tylenol as needed for pain  Go to ER with any worsening symptoms, chest pain, sob, difficulty breathing, lethargy, confusion, dehyrdration, persistent fever 103 or higher  Not interested in paxlovid at this time  Birth control is contraindicated  Please Be Courteous:  You are being tested for novel coronavirus (COVID-19) your test is pending at this time  You need to self quarantine at least until you have the results back  This means go home and stay home  Have someone else  your medications for you and bring them to you and drop them off at your door  Tests typically come back in 1-3 days  Results can be found in the "COVID-19" section of your [facility] account  In Your Home:  If you live with other people, trying to avoid common spaces and disinfect areas that you come into contact with  Per the CDC's recommendations: persons who suspect that they might have COVID-19 should isolate, stay at home, and use a separate bedroom and bathroom if feasible  Isolation should begin even before seeking testing and before test results become available  All household members should start wearing a mask in the home, particularly in shared spaces where appropriate distancing is not possible  Take Care of Yourself:  Try to sleep on your stomach as much as possible  If you have the ability to, take vitamin D3 2000 IU by mouth daily, vitamin-C 1000 mg by mouth twice a day, a multivitamin daily to help boost your immune system  You should check your temperature twice day  Return to the emergency department for any severe shortness of breath or pulse ox less than 90%  If You Test Positive for COVID-19 (Isolate)     Everyone, regardless of vaccination status:     Stay home for 5 days  If you have no symptoms or your symptoms are resolving after 5 days, you can leave your house    Continue to wear a mask around others for 5 additional days      If you have a fever, continue to stay home until your fever resolves  If You Were Exposed to Someone with COVID-19 (Quarantine)  If you:  Have been boosted  OR  Completed the primary series of Pfizer or Moderna vaccine within the last 6 months  OR  Completed the primary series of J&J vaccine within the last 2 months     Wear a mask around others for 10 days  Test on day 5, if possible  If you develop symptoms get a test and stay home  If you:  Completed the primary series of Pfizer or Moderna vaccine over 6 months ago and are not boosted  OR  Completed the primary series of J&J over 2 months ago and are not boosted  OR  Are unvaccinated     Stay home for 5 days  After that continue to wear a mask around others for 5 additional days  If you cant quarantine you must wear a mask for 10 days  Test on day 5 if possible       If you develop symptoms get a test and stay home

## 2022-10-31 ENCOUNTER — ANNUAL EXAM (OUTPATIENT)
Dept: OBGYN CLINIC | Facility: MEDICAL CENTER | Age: 42
End: 2022-10-31

## 2022-10-31 VITALS
HEIGHT: 65 IN | SYSTOLIC BLOOD PRESSURE: 104 MMHG | WEIGHT: 159.6 LBS | DIASTOLIC BLOOD PRESSURE: 70 MMHG | BODY MASS INDEX: 26.59 KG/M2

## 2022-10-31 DIAGNOSIS — Z01.419 WELL WOMAN EXAM WITH ROUTINE GYNECOLOGICAL EXAM: Primary | ICD-10-CM

## 2022-10-31 DIAGNOSIS — Z12.31 ENCOUNTER FOR SCREENING MAMMOGRAM FOR MALIGNANT NEOPLASM OF BREAST: ICD-10-CM

## 2022-10-31 DIAGNOSIS — N80.9 ENDOMETRIOSIS: ICD-10-CM

## 2022-10-31 RX ORDER — NORGESTIMATE AND ETHINYL ESTRADIOL 7DAYSX3 LO
1 KIT ORAL DAILY
Qty: 84 TABLET | Refills: 4 | Status: SHIPPED | OUTPATIENT
Start: 2022-10-31

## 2022-10-31 NOTE — PROGRESS NOTES
Assessment   36 y o    presenting for annual exam      Plan   Diagnoses and all orders for this visit:    Well woman exam with routine gynecological exam  - Pap up to date  - Mammo ordered  - Return in 1yr for yearly    Encounter for screening mammogram for malignant neoplasm of breast  -     Mammo screening bilateral w 3d & cad; Future    Endometriosis  -     norgestimate-ethinyl estradiol (Tri-Lo-Sprintec) 0 18/0 215/0 25 MG-25 MCG per tablet; Take 1 tablet by mouth daily   Skip inactive pills and start new pack immediately  __________________________________________________________________      Subjective     Gio Gregg is a 36 y o  [id number] with regular menses who is sexually active and currently using contraception (cOCP) presenting for annual exam  She is without complaint      GYN  Complaints: denies  Denies change in menstrual cycle, dysmenorrhea, dyspareunia, genital discharge, genital ulcers, irregular/heavy menses, pelvic pain and vulvar/vaginal symptoms  Menarche at age 11  Dysmenorrhea: less cramping  Cyclic symptoms include breast tenderness, acne  Sexually active: Yes - single partner - male  Hx STI: denies   Hx Abnormal pap: denies  Last pap:  - NILM, HPV neg     OB  [id number]   Pregnancy complications: triplets  All 3 left for college this year  2 at [facility], 1 in [place]  S/p salpingectomy for sterilization        Complaints: denies  Denies urinary frequency, hematuria, urinary incontinence and dysuria     BREAST  Complaints: denies  Denies: breast lump, breast tenderness, changed mole, dryness, pruritus, rash, skin color change and skin lesion(s)  Last mammogram:  - birads1  Personal hx: denies  Family hx: denies fhx of breast, uterine, ovarian cancers  Grandmother with colon ca (62s, paternal)  Mother with colon ca (63yo)  Patient does do regular self-exams     GENERAL  PMH reviewed/updated and is as below  Patient does follow with a PCP    Works as in billing/coding for [employer]  Denies domestic violence  Exercise: barre3/dance exercises   Diet: nothing specific     SCREENING  Cervical Ca: pap up to date  Breast Ca: mammo slip given, clinicians breast exam done  Colon Ca: follows due to crohns   colonoscopy due next year (alternating with CT testing)      Past Medical History:   Diagnosis Date   • Arthritis     assoc w Crohn's   • Avascular necrosis ([address] )     right ankle, right hip, left elbow   • Cancer ([address] )     melanoma   • Crohn's disease ([address] )    • Disease of thyroid gland     during pregnancy   • Endometriosis    • Female infertility    • GERD (gastroesophageal reflux disease)    • Gestational diabetes mellitus     Gestational   • Malignant melanoma ([address] )     on back, had wide excision   • Orbital myositis     Bilateral   • PONV (postoperative nausea and vomiting)    • Right ovarian cyst 2020   • Thyroid disease     nodules       Past Surgical History:   Procedure Laterality Date   • ABDOMINOPLASTY     •  SECTION     • CYSTOSCOPY N/A 2020    Procedure: CYSTOSCOPY;  Surgeon: Cassia Mcneal MD;  [facility];  Service: Gynecology   • DENTAL SURGERY     • DIAGNOSTIC LAPAROSCOPY  2003    for endometriosis   • DILATION AND CURETTAGE OF UTERUS     • EGD     • HERNIA REPAIR     • KNEE SURGERY Right     torn meniscus   • OOPHORECTOMY Right     2020   • IN LAP,CHOLECYSTECTOMY N/A 2022    Procedure: LAP KENIA W/ ROBOTICS;  Surgeon: Connie Millan MD;  [facility];  Service: General   • IN LAP,DIAGNOSTIC ABDOMEN N/A 2020    Procedure: DX LAP;  Surgeon: Cassia Mcneal MD;  [facility];  Service: Gynecology   • IN LAP,FULGURATE/EXCISE LESIONS N/A 2020    Procedure: Minta Lennox;  Surgeon: Cassia Mcneal MD;  [facility];  Service: Gynecology   • IN LAP,RMV  ADNEXAL STRUCTURE Bilateral 2020    Procedure: SALPINGECTOMY;  Surgeon: Cassia Mcneal MD;  [facility];  Service: Gynecology   • CO LAP,RMV  ADNEXAL STRUCTURE Right 06/22/2020    Procedure: OOPHORECTOMY;  Surgeon: Debbie Ricardo MD;  [facility];  Service: Gynecology   • SALPINGECTOMY Bilateral    • SKIN LESION EXCISION  2008    melanoma of back         Current Outpatient Medications:   •  Calcium Carbonate-Vitamin D (CALCIUM PLUS VITAMIN D PO), Take by mouth daily , Disp: , Rfl:   •  clonazePAM (KlonoPIN) 1 mg tablet, Take 1 tablet (1 mg total) by mouth daily at bedtime (Patient taking differently: Take 1 mg by mouth daily at bedtime as needed), Disp: 90 tablet, Rfl: 0  •  Multiple Vitamins-Minerals (WOMENS MULTIVITAMIN) TABS, Take 1 tablet by mouth daily , Disp: , Rfl:   •  norgestimate-ethinyl estradiol (Tri-Lo-Sprintec) 0 18/0 215/0 25 MG-25 MCG per tablet, Take 1 tablet by mouth daily, Disp: 84 tablet, Rfl: 3    Allergies   Allergen Reactions   • Other Anaphylaxis     [facility] [id number]: [place] bug spray   • Scopolamine Visual Disturbance   • Latex Rash   • Tape [Medical Tape] Rash       Social History     Tobacco Use   • Smoking status: Never Smoker   • Smokeless tobacco: Never Used   Vaping Use   • Vaping Use: Never used   Substance Use Topics   • Alcohol use: Yes     Alcohol/week: 1 0 standard drink     Types: 1 Glasses of wine per week     Comment: Social use   • Drug use: No           Objective  /70   Ht 5' 5" (1 651 m)   Wt 72 4 kg (159 lb 9 6 oz)   LMP  (LMP Unknown)   BMI 26 56 kg/m²      Physical Exam:  Physical Exam  Exam conducted with a chaperone present  Constitutional:       General: She is not in acute distress  Appearance: Normal appearance  She is well-developed  She is not ill-appearing, toxic-appearing or diaphoretic  HENT:      Head: Normocephalic and atraumatic  Eyes:      General: No scleral icterus  Right eye: No discharge  Left eye: No discharge        Conjunctiva/sclera: Conjunctivae normal    Cardiovascular:      Rate and Rhythm: Normal rate  Pulmonary:      Effort: Pulmonary effort is normal  No accessory muscle usage or respiratory distress  Chest:   Breasts:     Breasts are symmetrical       Right: No inverted nipple, mass, nipple discharge, skin change or tenderness  Left: No inverted nipple, mass, nipple discharge, skin change or tenderness  Abdominal:      General: There is no distension  Palpations: Abdomen is soft  There is no mass  Tenderness: There is no abdominal tenderness  There is no guarding or rebound  Genitourinary:     General: Normal vulva  Exam position: Lithotomy position  Labia:         Right: No rash, tenderness or lesion  Left: No rash, tenderness or lesion  Urethra: No prolapse, urethral swelling or urethral lesion  Vagina: No signs of injury  No vaginal discharge, erythema, tenderness, bleeding or lesions  Cervix: No cervical motion tenderness, discharge, friability, lesion or erythema  Uterus: Not enlarged, not fixed and not tender  Adnexa:         Right: No mass, tenderness or fullness  Left: No mass, tenderness or fullness  Rectum: No external hemorrhoid  Normal anal tone  Lymphadenopathy:      Upper Body:      Right upper body: No axillary or pectoral adenopathy  Left upper body: No axillary or pectoral adenopathy  Skin:     General: Skin is warm and dry  Findings: No erythema or rash  Neurological:      Mental Status: She is alert  Psychiatric:         Mood and Affect: Mood normal          Behavior: Behavior normal          Thought Content:  Thought content normal          Judgment: Judgment normal

## 2022-11-16 ENCOUNTER — HOSPITAL ENCOUNTER (OUTPATIENT)
Dept: ULTRASOUND IMAGING | Facility: MEDICAL CENTER | Age: 42
Discharge: HOME | End: 2022-11-16

## 2022-11-16 DIAGNOSIS — E04.1 NONTOXIC SINGLE THYROID NODULE: ICD-10-CM

## 2022-11-28 DIAGNOSIS — F51.01 PRIMARY INSOMNIA: ICD-10-CM

## 2022-11-29 RX ORDER — CLONAZEPAM 1 MG/1
1 TABLET ORAL
Qty: 90 TABLET | Refills: 0 | Status: SHIPPED | OUTPATIENT
Start: 2022-11-29

## 2022-12-05 ENCOUNTER — PATIENT MESSAGE (OUTPATIENT)
Dept: OBGYN CLINIC | Facility: MEDICAL CENTER | Age: 42
End: 2022-12-05

## 2022-12-05 DIAGNOSIS — N80.9 ENDOMETRIOSIS: Primary | ICD-10-CM

## 2022-12-12 RX ORDER — NORGESTIMATE AND ETHINYL ESTRADIOL 0.25-0.035
1 KIT ORAL DAILY
Qty: 28 TABLET | Refills: 1 | Status: SHIPPED | OUTPATIENT
Start: 2022-12-12

## 2022-12-29 ENCOUNTER — PATIENT MESSAGE (OUTPATIENT)
Dept: OBGYN CLINIC | Facility: MEDICAL CENTER | Age: 42
End: 2022-12-29

## 2022-12-29 DIAGNOSIS — N80.9 ENDOMETRIOSIS: ICD-10-CM

## 2022-12-29 RX ORDER — NORGESTIMATE AND ETHINYL ESTRADIOL 0.25-0.035
1 KIT ORAL DAILY
Qty: 84 TABLET | Refills: 3 | Status: SHIPPED | OUTPATIENT
Start: 2022-12-29

## 2023-01-18 ENCOUNTER — HOSPITAL ENCOUNTER (OUTPATIENT)
Dept: MAMMOGRAPHY | Facility: MEDICAL CENTER | Age: 43
Discharge: HOME | End: 2023-01-18

## 2023-01-18 VITALS — BODY MASS INDEX: 26.59 KG/M2 | HEIGHT: 65 IN | WEIGHT: 159.61 LBS

## 2023-01-18 DIAGNOSIS — Z12.31 ENCOUNTER FOR SCREENING MAMMOGRAM FOR MALIGNANT NEOPLASM OF BREAST: ICD-10-CM

## 2023-02-18 ENCOUNTER — APPOINTMENT (OUTPATIENT)
Dept: LAB | Facility: MEDICAL CENTER | Age: 43
End: 2023-02-18

## 2023-02-18 DIAGNOSIS — R00.2 PALPITATIONS: ICD-10-CM

## 2023-02-18 DIAGNOSIS — E04.1 NONTOXIC SINGLE THYROID NODULE: ICD-10-CM

## 2023-02-18 DIAGNOSIS — F41.9 ANXIETY DISORDER, UNSPECIFIED TYPE: ICD-10-CM

## 2023-02-18 DIAGNOSIS — K50.00 CROHN'S DISEASE OF SMALL INTESTINE WITHOUT COMPLICATION (HCC): ICD-10-CM

## 2023-02-18 LAB
ALBUMIN SERPL BCP-MCNC: 3.2 G/DL (ref 3.5–5)
ALP SERPL-CCNC: 62 U/L (ref 46–116)
ALT SERPL W P-5'-P-CCNC: 21 U/L (ref 12–78)
ANION GAP SERPL CALCULATED.3IONS-SCNC: 7 MMOL/L (ref 4–13)
AST SERPL W P-5'-P-CCNC: 23 U/L (ref 5–45)
BASOPHILS # BLD AUTO: 0.05 THOUSANDS/ÂΜL (ref 0–0.1)
BASOPHILS NFR BLD AUTO: 1 % (ref 0–1)
BILIRUB SERPL-MCNC: 0.9 MG/DL (ref 0.2–1)
BUN SERPL-MCNC: 10 MG/DL (ref 5–25)
CALCIUM ALBUM COR SERPL-MCNC: 9.7 MG/DL (ref 8.3–10.1)
CALCIUM SERPL-MCNC: 9.1 MG/DL (ref 8.3–10.1)
CHLORIDE SERPL-SCNC: 108 MMOL/L (ref 96–108)
CHOLEST SERPL-MCNC: 186 MG/DL
CO2 SERPL-SCNC: 23 MMOL/L (ref 21–32)
CREAT SERPL-MCNC: 0.79 MG/DL (ref 0.6–1.3)
EOSINOPHIL # BLD AUTO: 0.18 THOUSAND/ÂΜL (ref 0–0.61)
EOSINOPHIL NFR BLD AUTO: 3 % (ref 0–6)
ERYTHROCYTE [DISTWIDTH] IN BLOOD BY AUTOMATED COUNT: 12.8 % (ref 11.6–15.1)
GFR SERPL CREATININE-BSD FRML MDRD: 91 ML/MIN/1.73SQ M
GLUCOSE P FAST SERPL-MCNC: 92 MG/DL (ref 65–99)
HCT VFR BLD AUTO: 37.3 % (ref 34.8–46.1)
HDLC SERPL-MCNC: 93 MG/DL
HGB BLD-MCNC: 12.4 G/DL (ref 11.5–15.4)
IMM GRANULOCYTES # BLD AUTO: 0.01 THOUSAND/UL (ref 0–0.2)
IMM GRANULOCYTES NFR BLD AUTO: 0 % (ref 0–2)
LDLC SERPL CALC-MCNC: 69 MG/DL (ref 0–100)
LYMPHOCYTES # BLD AUTO: 1.91 THOUSANDS/ÂΜL (ref 0.6–4.47)
LYMPHOCYTES NFR BLD AUTO: 32 % (ref 14–44)
MCH RBC QN AUTO: 30 PG (ref 26.8–34.3)
MCHC RBC AUTO-ENTMCNC: 33.2 G/DL (ref 31.4–37.4)
MCV RBC AUTO: 90 FL (ref 82–98)
MONOCYTES # BLD AUTO: 0.4 THOUSAND/ÂΜL (ref 0.17–1.22)
MONOCYTES NFR BLD AUTO: 7 % (ref 4–12)
NEUTROPHILS # BLD AUTO: 3.37 THOUSANDS/ÂΜL (ref 1.85–7.62)
NEUTS SEG NFR BLD AUTO: 57 % (ref 43–75)
NONHDLC SERPL-MCNC: 93 MG/DL
NRBC BLD AUTO-RTO: 0 /100 WBCS
PLATELET # BLD AUTO: 324 THOUSANDS/UL (ref 149–390)
PMV BLD AUTO: 9.8 FL (ref 8.9–12.7)
POTASSIUM SERPL-SCNC: 3.7 MMOL/L (ref 3.5–5.3)
PROT SERPL-MCNC: 7 G/DL (ref 6.4–8.4)
RBC # BLD AUTO: 4.14 MILLION/UL (ref 3.81–5.12)
SODIUM SERPL-SCNC: 138 MMOL/L (ref 135–147)
TRIGL SERPL-MCNC: 118 MG/DL
TSH SERPL DL<=0.05 MIU/L-ACNC: 0.59 UIU/ML (ref 0.45–4.5)
WBC # BLD AUTO: 5.92 THOUSAND/UL (ref 4.31–10.16)

## 2023-05-11 ENCOUNTER — OFFICE VISIT (OUTPATIENT)
Dept: FAMILY MEDICINE CLINIC | Facility: CLINIC | Age: 43
End: 2023-05-11

## 2023-05-11 VITALS
BODY MASS INDEX: 26.79 KG/M2 | SYSTOLIC BLOOD PRESSURE: 94 MMHG | HEIGHT: 65 IN | OXYGEN SATURATION: 98 % | DIASTOLIC BLOOD PRESSURE: 66 MMHG | TEMPERATURE: 98.4 F | WEIGHT: 160.8 LBS | HEART RATE: 88 BPM

## 2023-05-11 DIAGNOSIS — M25.561 CHRONIC PAIN OF RIGHT KNEE: ICD-10-CM

## 2023-05-11 DIAGNOSIS — Z00.00 WELLNESS EXAMINATION: Primary | ICD-10-CM

## 2023-05-11 DIAGNOSIS — G89.29 CHRONIC PAIN OF RIGHT KNEE: ICD-10-CM

## 2023-05-11 NOTE — PROGRESS NOTES
Assessment/Plan: Vaccines up-to-date  Labs up-to-date  Gynecologic care as well as mammograms up-to-date  Patient up-to-date with colonoscopy done in 2022  Diagnoses and all orders for this visit:    Wellness examination  -     Lipid panel; Future  -     Hemoglobin A1C; Future  -     Comprehensive metabolic panel; Future    Chronic pain of right knee            Subjective:        Patient ID: Case Hood is a 36 y o  female  Patient is here for wellness exam   Labs and vaccines reviewed  Patient up-to-date with mammograms as well as gynecologic care  Patient feeling well overall  Patient with some ongoing right knee pain  Patient did have meniscal surgery done 2003  The following portions of the patient's history were reviewed and updated as appropriate: allergies, current medications, past family history, past medical history, past social history, past surgical history and problem list       Review of Systems   Constitutional: Negative  HENT: Negative  Eyes: Negative  Respiratory: Negative  Cardiovascular: Negative  Gastrointestinal: Negative  Endocrine: Negative  Genitourinary: Negative  Musculoskeletal: Positive for arthralgias  Skin: Negative  Allergic/Immunologic: Negative  Neurological: Negative  Hematological: Negative  Psychiatric/Behavioral: Negative  Objective:      BMI Counseling: Body mass index is 26 76 kg/m²  The BMI is above normal  Nutrition recommendations include consuming healthier snacks  Exercise recommendations include moderate physical activity 150 minutes/week  Rationale for BMI follow-up plan is due to patient being overweight or obese  Depression Screening and Follow-up Plan: Patient was screened for depression during today's encounter   They screened negative with a PHQ-2 score of 0             BP 94/66 (BP Location: Left arm, Patient Position: Sitting, Cuff Size: Standard)   Pulse 88   Temp 98 4 °F "(36 9 °C) (Temporal)   Ht 5' 5\" (1 651 m)   Wt 72 9 kg (160 lb 12 8 oz)   SpO2 98%   BMI 26 76 kg/m²          Physical Exam  Vitals and nursing note reviewed  Constitutional:       General: She is not in acute distress  Appearance: Normal appearance  She is not ill-appearing, toxic-appearing or diaphoretic  HENT:      Head: Normocephalic and atraumatic  Right Ear: Tympanic membrane, ear canal and external ear normal  There is no impacted cerumen  Left Ear: Tympanic membrane, ear canal and external ear normal  There is no impacted cerumen  Nose: Nose normal  No congestion or rhinorrhea  Mouth/Throat:      Mouth: Mucous membranes are moist       Pharynx: No oropharyngeal exudate or posterior oropharyngeal erythema  Eyes:      General: No scleral icterus  Right eye: No discharge  Left eye: No discharge  Extraocular Movements: Extraocular movements intact  Conjunctiva/sclera: Conjunctivae normal       Pupils: Pupils are equal, round, and reactive to light  Neck:      Vascular: No carotid bruit  Cardiovascular:      Rate and Rhythm: Normal rate and regular rhythm  Pulses: Normal pulses  Heart sounds: Normal heart sounds  No murmur heard  No friction rub  No gallop  Pulmonary:      Effort: Pulmonary effort is normal  No respiratory distress  Breath sounds: Normal breath sounds  No stridor  No wheezing, rhonchi or rales  Chest:      Chest wall: No tenderness  Musculoskeletal:         General: Tenderness present  No swelling, deformity or signs of injury  Cervical back: Normal range of motion and neck supple  No rigidity  No muscular tenderness  Right lower leg: No edema  Left lower leg: No edema  Lymphadenopathy:      Cervical: No cervical adenopathy  Skin:     General: Skin is warm and dry  Capillary Refill: Capillary refill takes less than 2 seconds  Coloration: Skin is not jaundiced        Findings: No " bruising, erythema, lesion or rash  Neurological:      Mental Status: She is alert and oriented to person, place, and time  Mental status is at baseline  Cranial Nerves: No cranial nerve deficit  Sensory: No sensory deficit  Motor: No weakness  Coordination: Coordination normal       Gait: Gait normal    Psychiatric:         Mood and Affect: Mood normal          Behavior: Behavior normal          Thought Content:  Thought content normal          Judgment: Judgment normal

## 2023-05-20 ENCOUNTER — APPOINTMENT (OUTPATIENT)
Dept: LAB | Facility: CLINIC | Age: 43
End: 2023-05-20

## 2023-05-20 DIAGNOSIS — Z00.8 HEALTH EXAMINATION IN POPULATION SURVEY: ICD-10-CM

## 2023-08-22 DIAGNOSIS — N80.9 ENDOMETRIOSIS: ICD-10-CM

## 2023-08-22 DIAGNOSIS — F51.01 PRIMARY INSOMNIA: ICD-10-CM

## 2023-08-23 RX ORDER — NORGESTIMATE AND ETHINYL ESTRADIOL 0.25-0.035
1 KIT ORAL DAILY
Qty: 84 TABLET | Refills: 0 | Status: SHIPPED | OUTPATIENT
Start: 2023-08-23

## 2023-08-23 RX ORDER — CLONAZEPAM 1 MG/1
1 TABLET ORAL
Qty: 90 TABLET | Refills: 0 | Status: SHIPPED | OUTPATIENT
Start: 2023-08-23

## 2023-09-27 ENCOUNTER — OFFICE VISIT (OUTPATIENT)
Dept: GASTROENTEROLOGY | Facility: MEDICAL CENTER | Age: 43
End: 2023-09-27
Payer: COMMERCIAL

## 2023-09-27 VITALS
WEIGHT: 160 LBS | HEART RATE: 79 BPM | TEMPERATURE: 98.2 F | BODY MASS INDEX: 26.66 KG/M2 | DIASTOLIC BLOOD PRESSURE: 81 MMHG | SYSTOLIC BLOOD PRESSURE: 144 MMHG | HEIGHT: 65 IN

## 2023-09-27 DIAGNOSIS — L29.0 RECTAL ITCHING: ICD-10-CM

## 2023-09-27 DIAGNOSIS — R10.9 ABDOMINAL PAIN, UNSPECIFIED ABDOMINAL LOCATION: ICD-10-CM

## 2023-09-27 DIAGNOSIS — K64.9 HEMORRHOIDS, UNSPECIFIED HEMORRHOID TYPE: ICD-10-CM

## 2023-09-27 DIAGNOSIS — K50.80 CROHN'S DISEASE OF BOTH SMALL AND LARGE INTESTINE WITHOUT COMPLICATION (HCC): Primary | ICD-10-CM

## 2023-09-27 PROCEDURE — 99214 OFFICE O/P EST MOD 30 MIN: CPT | Performed by: INTERNAL MEDICINE

## 2023-09-27 RX ORDER — DICYCLOMINE HCL 20 MG
20 TABLET ORAL EVERY 6 HOURS
Qty: 120 TABLET | Refills: 1 | Status: SHIPPED | OUTPATIENT
Start: 2023-09-27

## 2023-09-27 RX ORDER — HYDROCORTISONE ACETATE 25 MG/1
25 SUPPOSITORY RECTAL 2 TIMES DAILY
Qty: 12 SUPPOSITORY | Refills: 0 | Status: SHIPPED | OUTPATIENT
Start: 2023-09-27

## 2023-09-27 NOTE — PROGRESS NOTES
[facility] - Outpatient Follow-up Note  Melida Esparza 39 y.o. female MRN: [id number]  Encounter: [id number]          ASSESSMENT AND PLAN:  39year old female Crohn's disease of the small and large intestine, diagnosed at age 16, currently off treatment who presents for follow up evaluation. 1. Crohn's disease of both small and large intestine without complication ([address])  2. Abdominal pain, unspecified abdominal location  She has a history of ileocolonic Crohn's disease and as of May 2022 colonoscopy appears to be in remission. She last required Canasa suppositories and budesonide course in 2021. I have ordered repeat fecal calprotectin for evaluation of stool inflammation. She is due for surveillance colonoscopy in May 2024. She notes abdominal cramping discomfort and alternating bowel habits which may be related to IBS. I recommend using Bentyl up to 4 times a day as needed to treat her symptoms.    - Your next colonoscopy is due 2024    Health maintenance:  - Yearly flu shot. Avoid live vaccines  - Pneumovax and Prevnar 13 every 5 years  - Routine pap smear  - Yearly skin exam       - Calprotectin,Fecal; Future  - dicyclomine (BENTYL) 20 mg tablet; Take 1 tablet (20 mg total) by mouth every 6 (six) hours As needed for abdominal cramping  Dispense: 120 tablet; Refill: 1    3. Hemorrhoids, unspecified hemorrhoid type  4. Rectal itching  She reports significant rectal itching. Exam today is notable for nonthrombosed external hemorrhoids. She was previously found to have internal hemorrhoids on prior colonoscopy. I provided a prescription for Anusol suppository. She continue to use over-the-counter remedies such as witch hazel, sitz bath's, high-fiber diet as well to avoid hemorrhoidal irritation.    - hydrocortisone (ANUSOL-HC) 25 mg suppository; Insert 1 suppository (25 mg total) into the rectum 2 (two) times a day  Dispense: 12 suppository;  Refill: 0    Follow up in 1 year  ______________________________________________________________________    SUBJECTIVE:  39year old female Crohn's disease of the small and large intestine, diagnosed at age 16, currently off treatment who presents for follow up evaluation. She was last evaluated via telemedicine visit in January 2021. She had undergone colonoscopy in March 2020 showing 6 mm aphthous ulcer in the terminal ileum mild-to-moderate proctitis and prolapsing skin tag the anal verge. Pathology showed focal active cryptitis  and mild chronic colitis at 10 and 5 cm from the anal verge, terminal ileal biopsies were normal.  She was started on Canasa suppositories with resolution of her symptoms. She was also started on budesonide taper as well. In May 2022 she underwent EGD which was normal endoscopically and pathologically. Colonoscopy showed moderate edema of the terminal ileum without evidence of ulceration otherwise normal colonic mucosa. Terminal ileal, colonic biopsies were all unremarkable    Interval history: Since July she has had significant rectal itching. Around that time she noticed a hard dark nodule in her rectum which has since resolved. The nodule is not painful. She reports having bowel movements usually daily but at times can have more frequent stools associated with stress. She has had ongoing rectal itching and has used aspercream, Aquaphor, witch hazel, coconut oil with little to no relief. She notices lower abdominal cramping discomfort that is relieved after a bowel movement. May 2023 liver enzymes are within normal limits.   February 2023 hemoglobin normal.      February 2021 CT enterography showed subtle changes of chronic ileal inflammatory bowel disease with mild fibrofatty proliferation in the terminal ileum and scar versus small chronic ileal ileal fistula otherwise normal small bowel  March 2021 fecal calprotectin normal    IBD history:  She was diagnosed with Crohn's disease at age 15 after presenting with abdominal pain and bloody bowel movements.  She had previously had GI issues several years prior to this. Jessica Bello reports undergoing a colonoscopy and was diagnosed with inflammatory bowel disease of the small large intestine.  She was initially on high-dose steroids for several years and TPN for bowel rest.  She was ultimately transition to 5 ASA agents including Pentasa and Asacol.  However these were discontinued due to nausea.  She was ultimately placed on Imuran, the dose of which she cannot recall.  She remain on Imuran to her age 19s up until the time that she was trying to get pregnant and discontinue the medication.  She resumed the medication after her pregnancy and at age 28 was diagnosed with melanoma.  Afterwards the Imuran was discontinued and she has been off all medications since. Jessica Bello does note being diagnosed with avascular necrosis given her long use of steroids early on in her disease course.  She has no history of fistula or perianal disease     Prior treatments  5ASA: Pentasa, Asacol initially at diagnosis. Stopped due to nausea and GI upset  Steroids: yes. None for 10 years  Imuran: yes, on for 6-8 years. Stopped due to history of melanoma  Biologics: none prior      Extra intestinal manifestations:  At the time of her diagnosis she presented also with erythema nodosum.  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  For this she received high-dose of oral prednisone 80 mg daily.  It has been 10 years since her last steroid use. Jessica Bello does note intermittent joint discomfort particularly her ankles     REVIEW OF SYSTEMS IS OTHERWISE NEGATIVE.   10 point review of systems is negative other than stated as per HPI    Historical Information   Past Medical History:   Diagnosis Date   • Arthritis     assoc w Crohn's   • Avascular necrosis ([address])     right ankle, right hip, left elbow   • Cancer [facility])     melanoma   • Crohn's disease ([address])    • Disease of thyroid gland     during pregnancy   • Endometriosis    • Female infertility    • GERD (gastroesophageal reflux disease)    • Gestational diabetes mellitus     Gestational   • Malignant melanoma ([address])     on back, had wide excision   • Orbital myositis     Bilateral   • PONV (postoperative nausea and vomiting)    • Right ovarian cyst 2020   • Thyroid disease     nodules     Past Surgical History:   Procedure Laterality Date   • ABDOMINOPLASTY     •  SECTION     • CYSTOSCOPY N/A 2020    Procedure: CYSTOSCOPY;  Surgeon: Elana Morgan MD;  [facility];  Service: Gynecology   • DENTAL SURGERY     • DIAGNOSTIC LAPAROSCOPY  2003    for endometriosis   • DILATION AND CURETTAGE OF UTERUS     • EGD     • HERNIA REPAIR     • KNEE SURGERY Right     torn meniscus   • OOPHORECTOMY Right     2020   • DC LAPAROSCOPY SURG CHOLECYSTECTOMY N/A 2022    Procedure: LAP KENIA W/ ROBOTICS;  Surgeon: Bhavani Valerio MD;  [facility];  Service: General   • DC LAPAROSCOPY W/RMVL ADNEXAL STRUCTURES Bilateral 2020    Procedure: SALPINGECTOMY;  Surgeon: Elana Morgan MD;  [facility];  Service: Gynecology   • DC LAPAROSCOPY W/RMVL ADNEXAL STRUCTURES Right 2020    Procedure: OOPHORECTOMY;  Surgeon: Elana Morgan MD;  [facility];  Service: Gynecology   • DC LAPS ABD PRTM&OMENTUM DX W/WO SPEC BR/WA SPX N/A 2020    Procedure: DX LAP;  Surgeon: Elana Morgan MD;  [facility];  Service: Gynecology   • DC LAPS FULG/EXC OVARY VISCERA/PERITONEAL SURFACE N/A 2020    Procedure: Leopold Majestic;  Surgeon: Elana Morgan MD;  [facility];  Service: Gynecology   • SALPINGECTOMY Bilateral    • SKIN LESION EXCISION      melanoma of back     Social History   Social History     Substance and Sexual Activity   Alcohol Use Yes   • Alcohol/week: 1.0 standard drink of alcohol   • Types: 1 Glasses of wine per week    Comment: Social use     Social History     Substance and Sexual Activity   Drug Use No     Social History     Tobacco Use   Smoking Status Never   Smokeless Tobacco Never     Family History   Problem Relation Age of Onset   • Diabetes Mother    • Hypertension Mother    • Colon cancer Mother    • Pulmonary embolism Mother    • Hyperlipidemia Father    • Hypertension Father    • No Known Problems Daughter    • No Known Problems Daughter    • Lung cancer Maternal Grandmother    • Colon cancer Paternal Grandmother 61   • No Known Problems Paternal Aunt    • No Known Problems Paternal Aunt    • Anxiety disorder Family    • Coronary artery disease Family    • Depression Family    • Stroke Family         stroke syndrome   • Breast cancer Half-Sister         around age 54       Meds/Allergies       Current Outpatient Medications:   •  Calcium Carbonate-Vitamin D (CALCIUM PLUS VITAMIN D PO)  •  clonazePAM (KlonoPIN) 1 mg tablet  •  Multiple Vitamins-Minerals (WOMENS MULTIVITAMIN) TABS  •  norgestimate-ethinyl estradiol (Sprintec 28) 0.25-35 MG-MCG per tablet    Allergies   Allergen Reactions   • Other Anaphylaxis     Annotation - [id number]: [place] bug spray   • Scopolamine Visual Disturbance   • Latex Rash   • Tape [Medical Tape] Rash           Objective     Blood pressure 144/81, pulse 79, temperature 98.2 °F (36.8 °C), temperature source Tympanic, height 5' 5" (1.651 m), weight 72.6 kg (160 lb). There is no height or weight on file to calculate BMI. PHYSICAL EXAM:      General Appearance:   Alert, cooperative, no distress   HEENT:   Normocephalic, atraumatic, anicteric. Neck:  Supple, symmetrical, trachea midline   Lungs:   Clear to auscultation bilaterally; no rales, rhonchi or wheezing; respirations unlabored    Heart[de-identified]   Regular rate and rhythm; no murmur, rub, or gallop.    Abdomen:   Soft, non-tender, non-distended; normal bowel sounds; no masses, no organomegaly    Genitalia:   Deferred    Rectal:    Nonthrombosed external hemorrhoids, normal rectal tone, no stool in the rectal vault    Extremities:  No cyanosis, clubbing or edema    Pulses:  2+ and symmetric    Skin:  No jaundice, rashes, or lesions    Lymph nodes:  No palpable cervical lymphadenopathy        Lab Results:   No visits with results within 1 Day(s) from this visit. Latest known visit with results is:   Appointment on 05/20/2023   Component Date Value   • Cholesterol 05/20/2023 196    • Triglycerides 05/20/2023 148    • HDL, Direct 05/20/2023 92    • LDL Calculated 05/20/2023 74    • Non-HDL-Chol (CHOL-HDL) 05/20/2023 104    • Hemoglobin A1C 05/20/2023 5.2    • EAG 05/20/2023 103    • Sodium 05/20/2023 135    • Potassium 05/20/2023 3.8    • Chloride 05/20/2023 107    • CO2 05/20/2023 26    • ANION GAP 05/20/2023 2 (L)    • BUN 05/20/2023 8    • Creatinine 05/20/2023 0.86    • Glucose, Fasting 05/20/2023 92    • Calcium 05/20/2023 8.8    • Corrected Calcium 05/20/2023 9.4    • AST 05/20/2023 14    • ALT 05/20/2023 17    • Alkaline Phosphatase 05/20/2023 65    • Total Protein 05/20/2023 7.1    • Albumin 05/20/2023 3.2 (L)    • Total Bilirubin 05/20/2023 0.82    • eGFR 05/20/2023 83          Radiology Results:   No results found.

## 2023-09-27 NOTE — PATIENT INSTRUCTIONS
Hemorrhoids   AMBULATORY CARE:   Hemorrhoids  are swollen blood vessels inside your rectum (internal hemorrhoids) or on your anus (external hemorrhoids). Sometimes a hemorrhoid may prolapse. This means it extends out of your anus. Common symptoms include the following:   Pain or itching around your anus or inside your rectum    Swelling or bumps around your anus    Bright red blood in your bowel movement, on the toilet paper, or in the toilet bowl    Tissue bulging out of your anus (prolapsed hemorrhoids)    Incontinence (poor control over urine or bowel movements)    Seek care immediately if:   You have severe pain in your rectum or around your anus. You have severe pain in your abdomen and you are vomiting. You have bleeding from your anus that soaks through your underwear. Contact your healthcare provider if:   You have frequent and painful bowel movements. Your hemorrhoid looks or feels more swollen than usual.     You do not have a bowel movement for 2 days or more. You see or feel tissue coming through your anus. You have questions or concerns about your condition or care. Treatment for hemorrhoids  may include medicines to decrease pain, swelling, and itching. The medicine may be a pill, pad, cream, or ointment. Medicine may also be given to soften your bowel movement. Surgery and other procedures may be needed to shrink or remove your hemorrhoids. Manage your symptoms:   Apply ice on your anus for 15 to 20 minutes every hour or as directed. Use an ice pack, or put crushed ice in a plastic bag. Cover it with a towel before you apply it to your anus. Ice helps prevent tissue damage and decreases swelling and pain. Take a sitz bath. Fill a bathtub with 4 to 6 inches of warm water. You may also use a sitz bath pan that fits inside a toilet bowl. Sit in the sitz bath for 15 minutes. Do this 3 times a day, and after each bowel movement.  The warm water can help decrease pain and swelling. Keep your anal area clean. Gently wash the area with warm water daily. Soap may irritate the area. After a bowel movement, wipe with moist towelettes or wet toilet paper. Dry toilet paper can irritate the area. Prevent hemorrhoids:   Do not strain to have a bowel movement. Do not sit on the toilet too long. These actions can increase pressure on the tissues in your rectum and anus. Drink plenty of liquids. Liquids can help prevent constipation. Ask how much liquid to drink each day and which liquids are best for you. Eat a variety of high-fiber foods. Examples include fruits, vegetables, and whole grains. Ask your healthcare provider how much fiber you need each day. You may need to take a fiber supplement. Exercise as directed. Exercise, such as walking, may make it easier to have a bowel movement. Ask your healthcare provider to help you create an exercise plan. Do not have anal sex. Anal sex can weaken the skin around your rectum and anus. Avoid heavy lifting. This can cause straining and increase your risk for another hemorrhoid. Follow up with your doctor as directed:  Write down your questions so you remember to ask them during your visits. © Copyright Krista Oconnor 2023 Information is for End User's use only and may not be sold, redistributed or otherwise used for commercial purposes. The above information is an  only. It is not intended as medical advice for individual conditions or treatments. Talk to your doctor, nurse or pharmacist before following any medical regimen to see if it is safe and effective for you.

## 2023-10-25 ENCOUNTER — LAB (OUTPATIENT)
Dept: LAB | Facility: MEDICAL CENTER | Age: 43
End: 2023-10-25
Payer: COMMERCIAL

## 2023-10-25 DIAGNOSIS — K50.80 CROHN'S DISEASE OF BOTH SMALL AND LARGE INTESTINE WITHOUT COMPLICATION (HCC): ICD-10-CM

## 2023-10-25 PROCEDURE — 83993 ASSAY FOR CALPROTECTIN FECAL: CPT

## 2023-10-27 LAB — CALPROTECTIN STL-MCNT: 6 UG/G (ref 0–120)

## 2023-11-01 NOTE — RESULT ENCOUNTER NOTE
Please call the patient with the results    I sent the result via [employer] but received a notification that it was not viewed. Your stool testing for inflammation is negative. This can mean that your Crohn's disease is still in remission. This is great news.  Please let me know if you have any questions

## 2023-11-06 ENCOUNTER — ANNUAL EXAM (OUTPATIENT)
Dept: OBGYN CLINIC | Facility: MEDICAL CENTER | Age: 43
End: 2023-11-06
Payer: COMMERCIAL

## 2023-11-06 VITALS
BODY MASS INDEX: 27.24 KG/M2 | HEIGHT: 65 IN | SYSTOLIC BLOOD PRESSURE: 112 MMHG | WEIGHT: 163.5 LBS | DIASTOLIC BLOOD PRESSURE: 70 MMHG

## 2023-11-06 DIAGNOSIS — K64.9 HEMORRHOIDS, UNSPECIFIED HEMORRHOID TYPE: ICD-10-CM

## 2023-11-06 DIAGNOSIS — L29.0 RECTAL ITCHING: ICD-10-CM

## 2023-11-06 DIAGNOSIS — N80.9 ENDOMETRIOSIS DETERMINED BY LAPAROSCOPY: ICD-10-CM

## 2023-11-06 DIAGNOSIS — N80.9 ENDOMETRIOSIS: ICD-10-CM

## 2023-11-06 DIAGNOSIS — Z01.419 WELL WOMAN EXAM WITH ROUTINE GYNECOLOGICAL EXAM: Primary | ICD-10-CM

## 2023-11-06 DIAGNOSIS — Z12.11 COLON CANCER SCREENING: ICD-10-CM

## 2023-11-06 DIAGNOSIS — Z12.31 BREAST CANCER SCREENING BY MAMMOGRAM: ICD-10-CM

## 2023-11-06 PROCEDURE — S0612 ANNUAL GYNECOLOGICAL EXAMINA: HCPCS | Performed by: OBSTETRICS & GYNECOLOGY

## 2023-11-06 RX ORDER — HYDROCORTISONE ACETATE 25 MG/1
25 SUPPOSITORY RECTAL 2 TIMES DAILY
Qty: 12 SUPPOSITORY | Refills: 0 | Status: SHIPPED | OUTPATIENT
Start: 2023-11-06

## 2023-11-06 NOTE — PROGRESS NOTES
Assessment   39 y.o. [id number] presenting for annual exam.     Plan   Diagnoses and all orders for this visit:    Well woman exam with routine gynecological exam  - Pap up to date  - Mammo ordered  - Colonoscopy current  - Return in 1yr for yearly    Breast cancer screening by mammogram  -     Mammo screening bilateral w 3d & cad; Future    Colon cancer screening  - Follows regularly for crohns    Endometriosis determined by laparoscopy  -     norgestimate-ethinyl estradiol (Sprintec 28) 0.25-35 MG-MCG per tablet; Take 1 tablet by mouth daily . Skip inactive pills and start new pack immediately. __________________________________________________________________      Subjective     Eliceo Chaney is a 39 y.o. [id number] with regular menses who is sexually active and currently using contraception (cOCP, also s/p BTL) presenting for annual exam. She is without complaint. May had a firm, purple spot on labia. Resolve 1wk later. Recent issues with internal hemorrhoids     GYN  Complaints: denies  Denies change in menstrual cycle, dysmenorrhea, dyspareunia, genital discharge, genital ulcers, irregular/heavy menses, pelvic pain and vulvar/vaginal symptoms  Menarche at age 14  Dysmenorrhea: less cramping.    Cyclic symptoms include breast tenderness, acne  Sexually active: Yes - single partner - male  Hx STI: denies   Hx Abnormal pap: denies  Last pap: 2021 - NILM, HPV neg     OB  [id number]   Pregnancy complications: triplets  All 3 in college (2 at [facility], 1 in [place])  S/p salpingectomy for sterilization        Complaints: denies  Denies urinary frequency, hematuria, urinary incontinence and dysuria     BREAST  Complaints: denies  Denies: breast lump, breast tenderness, changed mole, dryness, pruritus, rash, skin color change and skin lesion(s)  Last mammogram: 2023 - bir[facility]  Personal hx: denies  Family hx: denies fhx of breast, uterine, ovarian cancers  Grandmother with colon ca (62s, paternal)  Mother with colon ca (65yo)  Son with bipolar 1 (dx )  Patient does do regular self-exams     GENERAL  PMH reviewed/updated and is as below. Patient does follow with a PCP. 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 Denies domestic violence.   Exercise: barre3/dance exercises   Diet: nothing specific     SCREENING  Cervical Ca: pap up to date  Breast Ca: mammo slip given, clinicians breast exam done  Colon Ca:  - colonoscopy - repeat 2yr (follows due to crohns, alternates c-scope and CT testing)      Past Medical History:   Diagnosis Date    Arthritis     assoc w Crohn's    Avascular necrosis ([address])     right ankle, right hip, left elbow    Cancer ([address])     melanoma    Crohn's disease ([address])     Disease of thyroid gland     during pregnancy    Endometriosis     Female infertility     GERD (gastroesophageal reflux disease)     Gestational diabetes mellitus     Gestational    Malignant melanoma ([address])     on back, had wide excision    Orbital myositis     Bilateral    PONV (postoperative nausea and vomiting)     Right ovarian cyst 2020    Thyroid disease     nodules       Past Surgical History:   Procedure Laterality Date    ABDOMINOPLASTY       SECTION      CYSTOSCOPY N/A 2020    Procedure: CYSTOSCOPY;  Surgeon: Yuliya Guidry MD;  Location: [facility];  Service: Gynecology    DENTAL SURGERY      DIAGNOSTIC LAPAROSCOPY  2003    for endometriosis    DILATION AND CURETTAGE OF UTERUS      EGD      HERNIA REPAIR      KNEE SURGERY Right     torn meniscus    OOPHORECTOMY Right     2020    [place] LAPAROSCOPY SURG CHOLECYSTECTOMY N/A 2022    Procedure: LAP KENIA W/ ROBOTICS;  Surgeon: Elva Irwin MD;  Location[facility];  Service: General    [place] LAPAROSCOPY W/RMVL ADNEXAL STRUCTURES Bilateral 2020    Procedure: SALPINGECTOMY;  Surgeon: Yuliya Guidry MD;  [facility];  Service: Gynecology    [place] LAPAROSCOPY W/RMVL ADNEXAL STRUCTURES Right 2020 Procedure: OOPHORECTOMY;  Surgeon: Ella Corcoran MD;  [facility];  Service: Gynecology    [place] LAPS ABD PRTM&OMENTUM DX W/WO SPEC BR/WA SPX N/A 06/22/2020    Procedure: DX LAP;  Surgeon: Ella Corcoran MD;  [facility];  Service: Gynecology    NC LAPS FULG/EXC OVARY VISCERA/PERITONEAL SURFACE N/A 06/22/2020    Procedure: Cortney Adelnia;  Surgeon: Ella Corcoran MD;  [facility];  Service: Gynecology    SALPINGECTOMY Bilateral     SKIN LESION EXCISION  2008    melanoma of back         Current Outpatient Medications:     Calcium Carbonate-Vitamin D (CALCIUM PLUS VITAMIN D PO), Take by mouth daily , Disp: , Rfl:     clonazePAM (KlonoPIN) 1 mg tablet, Take 1 tablet (1 mg total) by mouth daily at bedtime, Disp: 90 tablet, Rfl: 0    dicyclomine (BENTYL) 20 mg tablet, Take 1 tablet (20 mg total) by mouth every 6 (six) hours As needed for abdominal cramping, Disp: 120 tablet, Rfl: 1    hydrocortisone (ANUSOL-HC) 25 mg suppository, Insert 1 suppository (25 mg total) into the rectum 2 (two) times a day, Disp: 12 suppository, Rfl: 0    Multiple Vitamins-Minerals (WOMENS MULTIVITAMIN) TABS, Take 1 tablet by mouth daily , Disp: , Rfl:     norgestimate-ethinyl estradiol (Sprintec 28) 0.25-35 MG-MCG per tablet, Take 1 tablet by mouth daily . Skip inactive pills and start new pack immediately. , Disp: 84 tablet, Rfl: 0    Allergies   Allergen Reactions    Other Anaphylaxis     [facility] - [id number]: [place] bug spray    Scopolamine Visual Disturbance    Latex Rash    Tape [Medical Tape] Rash       Social History     Tobacco Use    Smoking status: Never    Smokeless tobacco: Never   Vaping Use    Vaping Use: Never used   Substance Use Topics    Alcohol use: Yes     Alcohol/week: 1.0 standard drink of alcohol     Types: 1 Glasses of wine per week     Comment: Social use    Drug use:  No             Objective  /70   Ht 5' 5" (1.651 m)   Wt 74.2 kg (163 lb 8 oz)   BMI 27.21 kg/m² Physical Exam:  Physical Exam  Exam conducted with a chaperone present. Constitutional:       General: She is not in acute distress. Appearance: Normal appearance. She is well-developed. She is not ill-appearing, toxic-appearing or diaphoretic. HENT:      Head: Normocephalic and atraumatic. Eyes:      General: No scleral icterus. Right eye: No discharge. Left eye: No discharge. Conjunctiva/sclera: Conjunctivae normal.   Cardiovascular:      Rate and Rhythm: Normal rate. Pulmonary:      Effort: Pulmonary effort is normal. No accessory muscle usage or respiratory distress. Chest:   Breasts:     Breasts are symmetrical.      Right: No inverted nipple, mass, nipple discharge, skin change or tenderness. Left: No inverted nipple, mass, nipple discharge, skin change or tenderness. Abdominal:      General: There is no distension. Palpations: Abdomen is soft. There is no mass. Tenderness: There is no abdominal tenderness. There is no guarding or rebound. Genitourinary:     General: Normal vulva. Exam position: Lithotomy position. Labia:         Right: No rash, tenderness or lesion. Left: No rash, tenderness or lesion. Urethra: No prolapse, urethral swelling or urethral lesion. Vagina: No signs of injury. No vaginal discharge, erythema, tenderness, bleeding or lesions. Cervix: No cervical motion tenderness, discharge, friability, lesion or erythema. Uterus: Not enlarged, not fixed and not tender. Adnexa:         Right: No mass, tenderness or fullness. Left: No mass, tenderness or fullness. Rectum: No external hemorrhoid. Normal anal tone. Lymphadenopathy:      Upper Body:      Right upper body: No axillary or pectoral adenopathy. Left upper body: No axillary or pectoral adenopathy. Skin:     General: Skin is warm and dry. Findings: No erythema or rash.    Neurological:      Mental Status: She is alert.   Psychiatric:         Mood and Affect: Mood normal.         Behavior: Behavior normal.         Thought Content:  Thought content normal.         Judgment: Judgment normal.               \

## 2023-11-10 RX ORDER — NORGESTIMATE AND ETHINYL ESTRADIOL 0.25-0.035
1 KIT ORAL DAILY
Qty: 112 TABLET | Refills: 3 | Status: SHIPPED | OUTPATIENT
Start: 2023-11-10

## 2023-11-27 DIAGNOSIS — F51.01 PRIMARY INSOMNIA: ICD-10-CM

## 2023-11-27 RX ORDER — CLONAZEPAM 1 MG/1
1 TABLET ORAL
Qty: 30 TABLET | Refills: 0 | Status: SHIPPED | OUTPATIENT
Start: 2023-11-27

## 2023-12-21 DIAGNOSIS — Z00.6 ENCOUNTER FOR EXAMINATION FOR NORMAL COMPARISON OR CONTROL IN CLINICAL RESEARCH PROGRAM: ICD-10-CM

## 2024-01-19 ENCOUNTER — HOSPITAL ENCOUNTER (OUTPATIENT)
Dept: MAMMOGRAPHY | Facility: MEDICAL CENTER | Age: 44
Discharge: HOME | End: 2024-01-19
Payer: COMMERCIAL

## 2024-01-19 ENCOUNTER — APPOINTMENT (OUTPATIENT)
Dept: LAB | Facility: MEDICAL CENTER | Age: 44
End: 2024-01-19
Payer: COMMERCIAL

## 2024-01-19 VITALS — WEIGHT: 163 LBS | BODY MASS INDEX: 27.16 KG/M2 | HEIGHT: 65 IN

## 2024-01-19 DIAGNOSIS — Z00.6 ENCOUNTER FOR EXAMINATION FOR NORMAL COMPARISON OR CONTROL IN CLINICAL RESEARCH PROGRAM: ICD-10-CM

## 2024-01-19 DIAGNOSIS — Z12.31 BREAST CANCER SCREENING BY MAMMOGRAM: ICD-10-CM

## 2024-01-19 PROCEDURE — 36415 COLL VENOUS BLD VENIPUNCTURE: CPT

## 2024-01-19 PROCEDURE — 77063 BREAST TOMOSYNTHESIS BI: CPT

## 2024-01-19 PROCEDURE — 77067 SCR MAMMO BI INCL CAD: CPT

## 2024-01-22 ENCOUNTER — OFFICE VISIT (OUTPATIENT)
Dept: PODIATRY | Facility: CLINIC | Age: 44
End: 2024-01-22
Payer: COMMERCIAL

## 2024-01-22 VITALS
HEART RATE: 92 BPM | SYSTOLIC BLOOD PRESSURE: 120 MMHG | DIASTOLIC BLOOD PRESSURE: 78 MMHG | HEIGHT: 66 IN | WEIGHT: 160 LBS | BODY MASS INDEX: 25.71 KG/M2

## 2024-01-22 DIAGNOSIS — M87.00 AVASCULAR BONE NECROSIS (HCC): ICD-10-CM

## 2024-01-22 DIAGNOSIS — G89.29 CHRONIC HEEL PAIN, RIGHT: Primary | ICD-10-CM

## 2024-01-22 DIAGNOSIS — M79.671 CHRONIC HEEL PAIN, RIGHT: Primary | ICD-10-CM

## 2024-01-22 PROCEDURE — 99204 OFFICE O/P NEW MOD 45 MIN: CPT | Performed by: PODIATRIST

## 2024-01-22 NOTE — PROGRESS NOTES
"Assessment/Plan:       Diagnoses and all orders for this visit:    Chronic heel pain, right  -     X-ray calcaneus right 2+ views; Future    Avascular bone necrosis (HCC)  Comments:  talar dome collapse, no acute pain on exam today, monitor for now.      Diagnosis and options discussed with patient  Patient agreeable to the plan as stated below    Regarding plantar fascitis:  1. Discussed diagnosis and treatment options  2. Provided home therapy and stretching exercises  3. Deferred injections today, but did discuss the possibility.   4. Stressed compliance with home/formal PT and arch supports.    Regarding her XR finding of chronic talar AVN. She states this occurred as a teenager from extended use of steroids for her Crohns. She has multiple joints that developed AVN. At the moment her ankle does not hurt and is surprisingly functional. I did recommend daily therapy exercises, proper shoes, weight control.       Subjective:      Patient ID: Ana Stroud is a 43 y.o. female.    Patient has right heel pain. She gets some joint pains secondary to Crohns disease. She was on steroids as a teenager for Crohns and has been told she has \"AVN in multiple joints.\" She likes to exercise. She does Yoga and Barre3 a lot.         The following portions of the patient's history were reviewed and updated as appropriate: allergies, current medications, past family history, past medical history, past social history, past surgical history, and problem list.    Review of Systems    As stated in HPI, otherwise normal      Objective:      /78 (BP Location: Right arm, Patient Position: Sitting, Cuff Size: Standard)   Pulse 92   Ht 5' 5.5\" (1.664 m)   Wt 72.6 kg (160 lb)   LMP 01/11/2024 (Exact Date)   BMI 26.22 kg/m²          Physical Exam  Vitals reviewed.   Constitutional:       Appearance: She is not ill-appearing or diaphoretic.   Cardiovascular:      Rate and Rhythm: Normal rate.      Pulses: Normal pulses.    "        Dorsalis pedis pulses are 2+ on the right side.        Posterior tibial pulses are 2+ on the right side.   Pulmonary:      Effort: Pulmonary effort is normal. No respiratory distress.   Musculoskeletal:      Right foot: Decreased range of motion (right ankle crepitus with DF but no pain.).        Feet:    Feet:      Right foot:      Protective Sensation:   10 sites sensed.   Skin:     Capillary Refill: Capillary refill takes less than 2 seconds.      Findings: No erythema or rash.   Neurological:      Mental Status: She is alert and oriented to person, place, and time.      Sensory: No sensory deficit.      Gait: Gait normal.   Psychiatric:         Mood and Affect: Mood normal.             XRay right heel views of the 2 views personally read by Dr. Saunders in office today and discussed with patient:    There is no acute fracture or dislocation.  Severe degenerative changes to talar dome.  No lytic or blastic osseous lesion.  Soft tissues are unremarkable.  Heel spurs noted

## 2024-02-18 DIAGNOSIS — F51.01 PRIMARY INSOMNIA: ICD-10-CM

## 2024-02-19 ENCOUNTER — TELEPHONE (OUTPATIENT)
Dept: PSYCHIATRY | Facility: CLINIC | Age: 44
End: 2024-02-19

## 2024-02-20 RX ORDER — CLONAZEPAM 1 MG/1
1 TABLET ORAL
Qty: 30 TABLET | Refills: 0 | Status: SHIPPED | OUTPATIENT
Start: 2024-02-20

## 2024-03-05 ENCOUNTER — TELEPHONE (OUTPATIENT)
Age: 44
End: 2024-03-05

## 2024-03-05 ENCOUNTER — PREP FOR PROCEDURE (OUTPATIENT)
Age: 44
End: 2024-03-05

## 2024-03-05 DIAGNOSIS — Z87.19 HISTORY OF CROHN'S DISEASE: Primary | ICD-10-CM

## 2024-03-05 NOTE — TELEPHONE ENCOUNTER
03/05/24  Screened by: Marichuy Canales    Referring Provider Dr. Ball    Pre- Screening:     BMI 26.22  Has patient been referred for a routine screening Colonoscopy? yes  Is the patient between 45-75 years old? yes      Previous Colonoscopy yes   If yes:    Date: 2022    Facility:     Reason:       Does the patient want to see a Gastroenterologist prior to their procedure OR are they having any GI symptoms? no    Has the patient been hospitalized or had abdominal surgery in the past 6 months? no    Does the patient use supplemental oxygen? no    Does the patient take Coumadin, Lovenox, Plavix, Elliquis, Xarelto, or other blood thinning medication? no    Has the patient had a stroke, cardiac event, or stent placed in the past year? no    If patient is between 45yrs - 49yrs, please advise patient that we will have to confirm benefits & coverage with their insurance company for a routine screening colonoscopy.

## 2024-03-05 NOTE — TELEPHONE ENCOUNTER
Scheduled date of colonoscopy (as of today): 6/17/24    Physician performing colonoscopy: Dr. Jaiyeola    Location of colonoscopy: [facility]

## 2024-03-09 LAB — GENE DIS ANL INTERP-IMP: NORMAL

## 2024-03-25 ENCOUNTER — APPOINTMENT (OUTPATIENT)
Dept: LAB | Facility: MEDICAL CENTER | Age: 44
End: 2024-03-25

## 2024-04-05 DIAGNOSIS — F51.01 PRIMARY INSOMNIA: ICD-10-CM

## 2024-04-05 RX ORDER — CLONAZEPAM 1 MG/1
1 TABLET ORAL
Qty: 30 TABLET | Refills: 0 | Status: SHIPPED | OUTPATIENT
Start: 2024-04-05

## 2024-04-14 LAB
APOB+LDLR+PCSK9 GENE MUT ANL BLD/T: NOT DETECTED
BRCA1+BRCA2 DEL+DUP + FULL MUT ANL BLD/T: NOT DETECTED
MLH1+MSH2+MSH6+PMS2 GN DEL+DUP+FUL M: NOT DETECTED

## 2024-05-17 ENCOUNTER — APPOINTMENT (OUTPATIENT)
Dept: LAB | Facility: MEDICAL CENTER | Age: 44
End: 2024-05-17

## 2024-05-17 DIAGNOSIS — Z00.8 HEALTH EXAMINATION IN POPULATION SURVEY: ICD-10-CM

## 2024-05-17 LAB
CHOLEST SERPL-MCNC: 177 MG/DL
EST. AVERAGE GLUCOSE BLD GHB EST-MCNC: 111 MG/DL
HBA1C MFR BLD: 5.5 %
HDLC SERPL-MCNC: 88 MG/DL
LDLC SERPL CALC-MCNC: 64 MG/DL (ref 0–100)
NONHDLC SERPL-MCNC: 89 MG/DL
TRIGL SERPL-MCNC: 126 MG/DL

## 2024-05-17 PROCEDURE — 83036 HEMOGLOBIN GLYCOSYLATED A1C: CPT

## 2024-05-17 PROCEDURE — 36415 COLL VENOUS BLD VENIPUNCTURE: CPT

## 2024-05-17 PROCEDURE — 80061 LIPID PANEL: CPT

## 2024-06-04 ENCOUNTER — TELEPHONE (OUTPATIENT)
Dept: GASTROENTEROLOGY | Facility: MEDICAL CENTER | Age: 44
End: 2024-06-04

## 2024-06-04 ENCOUNTER — ANESTHESIA (OUTPATIENT)
Dept: ANESTHESIOLOGY | Facility: HOSPITAL | Age: 44
End: 2024-06-04

## 2024-06-04 ENCOUNTER — ANESTHESIA EVENT (OUTPATIENT)
Dept: ANESTHESIOLOGY | Facility: HOSPITAL | Age: 44
End: 2024-06-04

## 2024-06-05 ENCOUNTER — OFFICE VISIT (OUTPATIENT)
Dept: FAMILY MEDICINE CLINIC | Facility: CLINIC | Age: 44
End: 2024-06-05
Payer: COMMERCIAL

## 2024-06-05 VITALS
OXYGEN SATURATION: 98 % | HEIGHT: 65 IN | TEMPERATURE: 97.6 F | RESPIRATION RATE: 16 BRPM | WEIGHT: 161 LBS | DIASTOLIC BLOOD PRESSURE: 70 MMHG | SYSTOLIC BLOOD PRESSURE: 120 MMHG | HEART RATE: 67 BPM | BODY MASS INDEX: 26.82 KG/M2

## 2024-06-05 DIAGNOSIS — Z00.00 WELL ADULT EXAM: Primary | ICD-10-CM

## 2024-06-05 PROCEDURE — 99396 PREV VISIT EST AGE 40-64: CPT | Performed by: FAMILY MEDICINE

## 2024-06-05 NOTE — PROGRESS NOTES
"Assessment/Plan: Wellness exam done.  Vaccines reviewed.  Labs up-to-date.  Patient up-to-date with gynecologic care as well as mammograms.  Patient will be having colonoscopy this month colorectal screening.  Patient will continue to try to exercise and diet appropriately.       Diagnoses and all orders for this visit:    Well adult exam            Subjective:        Patient ID: Ana Stroud is a 44 y.o. female.      Patient here for wellness exam.  Labs and vaccines reviewed.  Patient up-to-date with mammogram, gynecologic care and will be up-to-date with colorectal screening with colonoscopy in the next few weeks.  Ongoing arthritic pain left hip as well as bilateral knees right elbow.          The following portions of the patient's history were reviewed and updated as appropriate: allergies, current medications, past family history, past medical history, past social history, past surgical history and problem list.      Review of Systems   Constitutional: Negative.    HENT: Negative.     Eyes: Negative.    Respiratory: Negative.     Cardiovascular: Negative.    Gastrointestinal: Negative.    Endocrine: Negative.    Genitourinary: Negative.    Musculoskeletal:  Positive for arthralgias and gait problem.   Skin: Negative.    Allergic/Immunologic: Negative.    Hematological: Negative.    Psychiatric/Behavioral: Negative.             Objective:        Depression Screening and Follow-up Plan: Patient was screened for depression during today's encounter. They screened negative with a PHQ-2 score of 2.            /70   Pulse 67   Temp 97.6 °F (36.4 °C)   Resp 16   Ht 5' 5\" (1.651 m)   Wt 73 kg (161 lb)   SpO2 98%   BMI 26.79 kg/m²          Physical Exam    "

## 2024-06-14 RX ORDER — SODIUM CHLORIDE 9 MG/ML
125 INJECTION, SOLUTION INTRAVENOUS CONTINUOUS
Status: CANCELLED | OUTPATIENT
Start: 2024-06-14

## 2024-06-16 ENCOUNTER — ANESTHESIA EVENT (OUTPATIENT)
Dept: ANESTHESIOLOGY | Facility: HOSPITAL | Age: 44
End: 2024-06-16

## 2024-06-16 ENCOUNTER — ANESTHESIA (OUTPATIENT)
Dept: ANESTHESIOLOGY | Facility: HOSPITAL | Age: 44
End: 2024-06-16

## 2024-06-16 NOTE — ANESTHESIA PREPROCEDURE EVALUATION
Procedure:  PRE-OP ONLY    Relevant Problems   ANESTHESIA   (+) PONV (postoperative nausea and vomiting)      CARDIO   (+) Raynaud's disease without gangrene      NEURO/PSYCH   (+) Anxiety disorder   (+) Chronic left SI joint pain      Surgery/Wound/Pain   (+) Status post bilateral salpingectomy      Other   (+) Autoimmune disease (HCC)   (+) Crohn's disease (HCC)             Anesthesia Plan  ASA Score- 3     Anesthesia Type- IV sedation with anesthesia with ASA Monitors.         Additional Monitors:     Airway Plan:            Plan Factors-    Chart reviewed. EKG reviewed.  Existing labs reviewed. Patient summary reviewed.                  Induction-     Postoperative Plan-         Informed Consent-

## 2024-06-17 ENCOUNTER — HOSPITAL ENCOUNTER (OUTPATIENT)
Dept: GASTROENTEROLOGY | Facility: MEDICAL CENTER | Age: 44
Setting detail: OUTPATIENT SURGERY
Discharge: HOME | End: 2024-06-17
Payer: COMMERCIAL

## 2024-06-17 ENCOUNTER — ANESTHESIA (OUTPATIENT)
Dept: GASTROENTEROLOGY | Facility: MEDICAL CENTER | Age: 44
End: 2024-06-17

## 2024-06-17 ENCOUNTER — ANESTHESIA EVENT (OUTPATIENT)
Dept: GASTROENTEROLOGY | Facility: MEDICAL CENTER | Age: 44
End: 2024-06-17

## 2024-06-17 VITALS
SYSTOLIC BLOOD PRESSURE: 117 MMHG | HEART RATE: 85 BPM | BODY MASS INDEX: 26.66 KG/M2 | RESPIRATION RATE: 20 BRPM | OXYGEN SATURATION: 98 % | WEIGHT: 160 LBS | DIASTOLIC BLOOD PRESSURE: 69 MMHG | HEIGHT: 65 IN | TEMPERATURE: 98.7 F

## 2024-06-17 DIAGNOSIS — Z87.19 HISTORY OF CROHN'S DISEASE: ICD-10-CM

## 2024-06-17 LAB
EXT PREGNANCY TEST URINE: NEGATIVE
EXT. CONTROL: NORMAL

## 2024-06-17 PROCEDURE — 45380 COLONOSCOPY AND BIOPSY: CPT | Performed by: INTERNAL MEDICINE

## 2024-06-17 PROCEDURE — 88305 TISSUE EXAM BY PATHOLOGIST: CPT | Performed by: PATHOLOGY

## 2024-06-17 PROCEDURE — 81025 URINE PREGNANCY TEST: CPT | Performed by: ANESTHESIOLOGY

## 2024-06-17 RX ORDER — LIDOCAINE HYDROCHLORIDE 20 MG/ML
INJECTION, SOLUTION EPIDURAL; INFILTRATION; INTRACAUDAL; PERINEURAL AS NEEDED
Status: DISCONTINUED | OUTPATIENT
Start: 2024-06-17 | End: 2024-06-17

## 2024-06-17 RX ORDER — PROPOFOL 10 MG/ML
INJECTION, EMULSION INTRAVENOUS AS NEEDED
Status: DISCONTINUED | OUTPATIENT
Start: 2024-06-17 | End: 2024-06-17

## 2024-06-17 RX ORDER — SODIUM CHLORIDE 9 MG/ML
125 INJECTION, SOLUTION INTRAVENOUS CONTINUOUS
Status: DISCONTINUED | OUTPATIENT
Start: 2024-06-17 | End: 2024-06-21 | Stop reason: HOSPADM

## 2024-06-17 RX ORDER — PROPOFOL 10 MG/ML
INJECTION, EMULSION INTRAVENOUS CONTINUOUS PRN
Status: DISCONTINUED | OUTPATIENT
Start: 2024-06-17 | End: 2024-06-17

## 2024-06-17 RX ADMIN — PROPOFOL 100 MG: 10 INJECTION, EMULSION INTRAVENOUS at 15:37

## 2024-06-17 RX ADMIN — PROPOFOL 50 MG: 10 INJECTION, EMULSION INTRAVENOUS at 15:38

## 2024-06-17 RX ADMIN — PROPOFOL 50 MG: 10 INJECTION, EMULSION INTRAVENOUS at 15:39

## 2024-06-17 RX ADMIN — PROPOFOL 100 MCG/KG/MIN: 10 INJECTION, EMULSION INTRAVENOUS at 15:37

## 2024-06-17 RX ADMIN — LIDOCAINE HYDROCHLORIDE 100 MG: 20 INJECTION, SOLUTION EPIDURAL; INFILTRATION; INTRACAUDAL at 15:37

## 2024-06-17 RX ADMIN — PROPOFOL 100 MG: 10 INJECTION, EMULSION INTRAVENOUS at 15:46

## 2024-06-17 NOTE — ANESTHESIA PREPROCEDURE EVALUATION
Procedure:  COLONOSCOPY    Relevant Problems   ANESTHESIA   (+) PONV (postoperative nausea and vomiting)      CARDIO   (+) Raynaud's disease without gangrene      NEURO/PSYCH   (+) Anxiety disorder   (+) Chronic left SI joint pain      Obstetrics/Gynecology   (+) Endometriosis      Surgery/Wound/Pain   (+) Status post bilateral salpingectomy      Orthopedic/Musculoskeletal   (+) Chronic pain of right knee      Other   (+) Autoimmune disease (HCC)   (+) Crohn's disease (HCC)        Physical Exam    Airway    Mallampati score: II  TM Distance: >3 FB  Neck ROM: full     Dental   No notable dental hx     Cardiovascular  Rhythm: regular, Rate: normal, Cardiovascular exam normal    Pulmonary  Pulmonary exam normal Breath sounds clear to auscultation    Other Findings  post-pubertal.      Anesthesia Plan  ASA Score- 3     Anesthesia Type- IV sedation with anesthesia with ASA Monitors.         Additional Monitors:     Airway Plan:            Plan Factors-Exercise tolerance (METS): >4 METS.    Chart reviewed. EKG reviewed.  Existing labs reviewed. Patient summary reviewed.    Patient is not a current smoker.              Induction-     Postoperative Plan-         Informed Consent- Anesthetic plan and risks discussed with patient.

## 2024-06-17 NOTE — ANESTHESIA POSTPROCEDURE EVALUATION
Post-Op Assessment Note    CV Status:  Stable    Pain management: adequate       Mental Status:  Alert   Hydration Status:  Stable   PONV Controlled:  None   Airway Patency:  Patent     Post Op Vitals Reviewed: Yes    No anethesia notable event occurred.    Staff: Anesthesiologist               BP 96/60 (06/17/24 1605)    Temp      Pulse 97 (06/17/24 1605)   Resp 18 (06/17/24 1605)    SpO2 96 % (06/17/24 1605)

## 2024-06-17 NOTE — H&P
H&P EXAM - Outpatient Endoscopy   Ana Stroud 44 y.o. female MRN: [id number]    [facility] GI LAB PRE/PST   Encounter: [id number]        History and Physical -  [facility]  Ana Stroud 44 y.o. female MRN: [id number]                  HPI: Ana Stroud is a 44 y.o. year old female who presents for history of crohn's disease      REVIEW OF SYSTEMS: Per the HPI, and otherwise unremarkable.    Historical Information   Past Medical History:   Diagnosis Date    Anxiety     Arthritis     assoc w Crohn's    Avascular necrosis (HCC)     right ankle, right hip, left elbow    Cancer (HCC)     melanoma    Crohn's disease (HCC)     Depression     Disease of thyroid gland     during pregnancy    Endometriosis     Female infertility     GERD (gastroesophageal reflux disease)     Gestational diabetes mellitus     Gestational    Inflammatory bowel disease     Malignant melanoma (HCC)     on back, had wide excision    Orbital myositis     Bilateral    PONV (postoperative nausea and vomiting)     Right ovarian cyst 2020    Thyroid disease     nodules     Past Surgical History:   Procedure Laterality Date    ABDOMINOPLASTY       SECTION      CHOLECYSTECTOMY      CYSTOSCOPY N/A 2020    Procedure: CYSTOSCOPY;  Surgeon: Katherine Wong MD;  Location: [facility];  Service: Gynecology    DENTAL SURGERY      DIAGNOSTIC LAPAROSCOPY  2003    for endometriosis    DILATION AND CURETTAGE OF UTERUS      EGD      HERNIA REPAIR      KNEE SURGERY Right     torn meniscus    OOPHORECTOMY Right     2020    MD LAPAROSCOPY SURG CHOLECYSTECTOMY N/A 2022    Procedure: LAP KENIA W/ ROBOTICS;  Surgeon: Erwin Mariscal MD;  Location[facility];  Service: General    MD LAPAROSCOPY W/RMVL ADNEXAL STRUCTURES Bilateral 2020    Procedure: SALPINGECTOMY;  Surgeon: Katherine Wong MD;  [facility];  Service: Gynecology     NJ LAPAROSCOPY W/RMVL ADNEXAL STRUCTURES Right 2020    Procedure: OOPHORECTOMY;  Surgeon: Katherine Wong MD;  [facility];  Service: Gynecology    NJ LAPS ABD PRTM&OMENTUM DX W/WO SPEC BR/WA SPX N/A 2020    Procedure: DX LAP;  Surgeon: Katherine Wong MD;  [facility];  Service: Gynecology    NJ LAPS FULG/EXC OVARY VISCERA/PERITONEAL SURFACE N/A 2020    Procedure: FULG OF ENDOMETRIOSIS;  Surgeon: Katherine Wong MD;  [facility];  Service: Gynecology    SALPINGECTOMY Bilateral     SKIN LESION EXCISION      melanoma of back    TUBAL LIGATION       Social History   Social History     Substance and Sexual Activity   Alcohol Use Yes    Alcohol/week: 1.0 standard drink of alcohol    Types: 1 Glasses of wine per week    Comment: Social use     Social History     Substance and Sexual Activity   Drug Use Never     Social History     Tobacco Use   Smoking Status Never    Passive exposure: Never   Smokeless Tobacco Never     Family History   Problem Relation Age of Onset    Diabetes Mother     Hypertension Mother     Colon cancer Mother          06/10/21    Pulmonary embolism Mother     Cancer Mother         colon cancer    Hyperlipidemia Father     Hypertension Father     Heart disease Father         stent placed    Bipolar disorder Son     No Known Problems Daughter     No Known Problems Daughter     Lung cancer Maternal Grandmother     Cancer Maternal Grandmother         Lung cancer    Colon cancer Paternal Grandmother 60    No Known Problems Paternal Aunt     No Known Problems Paternal Aunt     Anxiety disorder Family     Coronary artery disease Family     Depression Family     Stroke Family         stroke syndrome    Breast cancer Half-Sister         around age 55       Meds/Allergies     Not in a hospital admission.    Allergies   Allergen Reactions    Other Anaphylaxis     Annotation - [id number]: [place] bug spray    Scopolamine Visual Disturbance    Latex  "Rash    Tape [Medical Tape] Rash       Objective     BP (!) 171/95   Pulse 98   Temp 98.7 °F (37.1 °C)   Resp 20   Ht 5' 5\" (1.651 m)   Wt 72.6 kg (160 lb)   LMP  (LMP Unknown)   SpO2 98%   BMI 26.63 kg/m²       PHYSICAL EXAM    Gen: NAD  CV: RRR  CHEST: Clear  ABD: soft, NT/ND  EXT: no edema      ASSESSMENT/PLAN:  This is a 44 y.o. year old female here for colonoscopy, and she is stable and optimized for her procedure.            "

## 2024-06-19 PROCEDURE — 88305 TISSUE EXAM BY PATHOLOGIST: CPT | Performed by: PATHOLOGY

## 2024-06-19 NOTE — RESULT ENCOUNTER NOTE
Results relayed via Mychart  Solitary aphthous ulcer in the terminal ileum with mild to moderate active ileitis otherwise unremarkable pathology  Obtain CT enterography in 6 months and repeat colonoscopy in 1 year

## 2024-07-05 PROBLEM — Z00.00 WELL ADULT EXAM: Status: RESOLVED | Noted: 2024-06-05 | Resolved: 2024-07-05

## 2024-07-22 ENCOUNTER — OFFICE VISIT (OUTPATIENT)
Dept: PHYSICAL THERAPY | Facility: MEDICAL CENTER | Age: 44
End: 2024-07-22
Payer: COMMERCIAL

## 2024-07-22 DIAGNOSIS — M25.521 RIGHT ELBOW PAIN: Primary | ICD-10-CM

## 2024-07-22 PROCEDURE — 97110 THERAPEUTIC EXERCISES: CPT | Performed by: PHYSICAL THERAPIST

## 2024-07-22 PROCEDURE — 97161 PT EVAL LOW COMPLEX 20 MIN: CPT | Performed by: PHYSICAL THERAPIST

## 2024-07-22 NOTE — PROGRESS NOTES
PT Evaluation     Today's date: 2024  Patient name: Ana Stroud  : 1980  MRN: [id number]  Referring provider: Eve Garland PT  Dx:   Encounter Diagnosis     ICD-10-CM    1. Right elbow pain  M25.521                      Assessment  Impairments: abnormal muscle firing, abnormal muscle tone, abnormal or restricted ROM, activity intolerance, impaired physical strength, lacks appropriate home exercise program, pain with function, participation limitations and activity limitations  Functional limitations: lifting, sleeping, recreation  Symptom irritability: low    Assessment details: Ana Stroud is a pleasant 44 y.o. female who presents with chronic R lateral elbow pain for the last 3 months. No further referral is necessary at this time based upon examination results.    Primary movement impairment is soft tissue hypertonicity in R wrist extensors and triceps, which contributes to stress on R lateral epicondylar structures, and limits functional activity tolerance. Patient also presents with R median/ulnar neural tension, which further contributes to recurrence of symptoms when sleeping. In addition, postural control dysfunction and concurrent weakness of bilateral scapular stabilizers cause excessive strain on R elbow when lifting. Patient was educated in an illustrated HEP for elbow and neural mobility and was able to complete exercises without pain. Patient was also educated to sleep with R elbow in EXT to minimize pain. Patient would benefit from skilled PT services to address the listed impairments to facilitate a return to PLOF. Thank you for the referral.     Barriers to therapy: none  Understanding of Dx/Px/POC: good     Prognosis: good  Prognosis details: Positive prognostic factors include positive attitude towards recovery. Negative prognostic factors include chronicity of symptoms, avascular necrosis in the contralateral elbow.    Goals  STG:  Patient will be independent  with home exercise program.   Patient will be able to perform a proper scapular retraction to demonstrate improved postural control for lifting.  LTG:  Patient will demonstrate at least 50% reduction in palpable soft tissue tension in R triceps/wrist extensors to maximize ability to utilize R elbow during ADL.  Patient will be able to sleep without R elbow pain.  Patient will return to yoga with modifications as necessary.  Patient will be able to manage symptoms independently.     Plan  Patient would benefit from: skilled physical therapy  Referral necessary: No  Planned modality interventions: cryotherapy and thermotherapy: hydrocollator packs    Planned therapy interventions: abdominal trunk stabilization, activity modification, IASTM, joint mobilization, kinesiology taping, manual therapy, massage, motor coordination training, neuromuscular re-education, postural training, patient/caregiver education, strengthening, stretching, therapeutic activities, therapeutic exercise, flexibility, functional ROM exercises, graded activity, graded exercise, home exercise program, behavior modification and body mechanics training    Frequency: 2x week  Duration in weeks: 6  Plan of Care beginning date: 7/22/2024  Plan of Care expiration date: 9/6/2024  Treatment plan discussed with: patient  Plan details: Prognosis is above given PT services 2x/week tapering to 1x/week for 6 additional weeks after this week and given HEP adherence.        Subjective Evaluation    History of Present Illness  Mechanism of injury: This is a 44 y.o. female presenting with R elbow pain for the last 3 months. She did some painting over a few days along with sanding. She developed soreness after this, but the pain progressively worsened a few days later. She continues to have pain in the outside of her R elbow and the back of her R upper arm. She also has some pain radiating into her R 4th and 5th digits. The pain wakes her up in the middle of the  night, and she is attempting to sleep in a propped/seated position due to pain when lying on her R side. She trialed Voltaren gel with no significant relief. 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 She also has a hx of melanoma that was removed in .              Not a recurrent problem   Quality of life: good    Patient Goals  Patient goals for therapy: decreased pain, increased strength and return to sport/leisure activities  Patient goal: to be able to sleep, to return to yoga, to be able to perform household chores  Pain  Current pain rating: 3  At best pain rating: 3  At worst pain ratin  Location: R lateral elbow/R upper arm/R digits 4 and 5  Quality: dull ache and sharp  Alleviating factors: compression sleeve for sleeping.  Aggravating factors: lifting (sleeping)    Social Support    Employment status: working ([employer])  Hand dominance: right      Diagnostic Tests  No diagnostic tests performed        Objective     Palpation     Right   Hypertonic in the triceps and wrist extensors.   Tenderness of the triceps and wrist extensors.     Tenderness     Right Elbow   Tenderness in the lateral epicondyle.     Right Wrist/Hand   Tenderness in the lateral epicondyle.     Active Range of Motion   Cervical/Thoracic Spine       Cervical    Flexion:  WFL  Extension:  WFL  Left rotation:  WFL  Right rotation:  WFL  Left Shoulder   Flexion: 125 degrees   External rotation BTH: C5   Internal rotation BTB: T10     Right Shoulder   Flexion: 135 degrees   External rotation BTH: C7 with pain  Internal rotation BTB: T12 with pain    Left Elbow   Flexion: WFL  Extension: 10 degrees   Forearm supination: with pain  Forearm pronation: with pain    Right Elbow   Flexion: WFL  Extension: -5 (hyperextension) degrees   Forearm supination: WFL and with pain  Forearm pronation: WFL    Left Wrist   Normal active range of  "motion    Right Wrist   Normal active range of motion    Passive Range of Motion     Left Wrist   Normal passive range of motion    Right Wrist   Normal passive range of motion    Joint Play     Left Elbow   Hypomobile in the proximal radioulnar joint.    Right Elbow   Joints within functional limits are the proximal radioulnar joint.     Strength/Myotome Testing     Left Shoulder     Planes of Motion   Flexion: 5   External rotation at 0°: 5   Internal rotation at 0°: 5     Isolated Muscles   Lower trapezius: 3+   Middle trapezius: 3+     Right Shoulder     Planes of Motion   Flexion: 5   External rotation at 0°: 5   Internal rotation at 0°: 5     Isolated Muscles   Lower trapezius: 3+   Middle trapezius: 3+     Left Elbow   Flexion: 5  Extension: 5  Forearm supination: 5  Forearm pronation: 5    Right Elbow   Flexion: 5  Extension: 5 (pain)  Forearm supination: 5 (pain)  Forearm pronation: 5    Left Wrist/Hand   Wrist extension: 5  Wrist flexion: 5    Right Wrist/Hand   Wrist extension: 4+ (pain)  Wrist flexion: 5    Tests     Left Shoulder   Negative ULTT1, ULTT2, ULTT3 and ULTT4.     Right Shoulder   Positive ULTT1 and ULTT4.   Negative ULTT2 and ULTT3.     Right Elbow   Positive Maudsley's and Mill's.   Negative Cozen's.              Precautions: avascular necrosis in L elbow/L hip/R ankle/R knee, hx of melanoma removal (2008), hx of benign cyst removal in R lateral upper arm (2021), Crohn's disease    *LATEX AND ADHESIVE ALLERGY    HEP: wrist extensor stretch, median nerve glides, table slides (EXT)  Manuals 7/22            STM to R distal triceps/wrist extensors NV                                                   Neuro Re-Ed             Median nerve glides X10 HEP            Prone retraction             Prone ext                                                                              Ther Ex             Supine ceiling punches for EXT NV            Table slides for EXT 5\"x20  HEP            Wrist EXT " "stretch 30\"x4 HEP            Pball rolls NV            Wrist rolls NV            Pulleys NV            HEP education and instruction x8'                         Ther Activity                                       Gait Training                                       Modalities                                            "

## 2024-07-25 ENCOUNTER — OFFICE VISIT (OUTPATIENT)
Dept: PHYSICAL THERAPY | Facility: MEDICAL CENTER | Age: 44
End: 2024-07-25
Payer: COMMERCIAL

## 2024-07-25 DIAGNOSIS — M25.521 RIGHT ELBOW PAIN: Primary | ICD-10-CM

## 2024-07-25 PROCEDURE — 97110 THERAPEUTIC EXERCISES: CPT | Performed by: PHYSICAL THERAPIST

## 2024-07-25 PROCEDURE — 97140 MANUAL THERAPY 1/> REGIONS: CPT | Performed by: PHYSICAL THERAPIST

## 2024-07-25 PROCEDURE — 97112 NEUROMUSCULAR REEDUCATION: CPT | Performed by: PHYSICAL THERAPIST

## 2024-07-25 NOTE — PROGRESS NOTES
"Daily Note     Today's date: 2024  Patient name: Ana Stroud  : 1980  MRN: [id number]  Referring provider: Eve Garland, PT  Dx:   Encounter Diagnosis     ICD-10-CM    1. Right elbow pain  M25.521                      Subjective: Patient reports that she is noticing improvements with less pain in her elbow, and she was able to sleep for a longer period of time last night. She has adjusted her desk at work to ensure her shoulder/elbow position is 90-90. She also has been sleeping with her elbow straight.      Objective: See treatment diary below      Assessment: Performed STM to R wrist EXTs/triceps to address soft tissue restrictions, and patient responded well to manual therapy with report of decreased tightness post-tx. Initiated wrist/elbow mobility, median neural mobility, and proximal postural control program as outlined below. Performed prone retraction/extension bilaterally to improve neuromotor control and to reduce compensatory stress on R elbow during ADL. Tactile cueing provided during prone retraction/ext to prevent UT/triceps compensation. Patient tolerated treatment well and completed program without pain. Patient would benefit from continued PT to address impairments to maximize function.      Plan: Continue per plan of care.      Precautions: avascular necrosis in L elbow/L hip/R ankle/R knee, hx of melanoma removal (), hx of benign cyst removal in R lateral upper arm (), Crohn's disease    *LATEX AND ADHESIVE ALLERGY    HEP: wrist extensor stretch, median nerve glides, table slides (EXT)  Manuals            STM to R distal triceps/wrist extensors NV KP                                                  Neuro Re-Ed             Median nerve glides X10 HEP x20           Prone retraction  3x5 b/l           Prone ext  2x5 b/l                                                                            Ther Ex             Supine ceiling punches for EXT NV 5\"x20 wand    " "       Table slides for EXT 5\"x20  HEP 5\"x20           Wrist EXT stretch 30\"x4 HEP 30\"x4           Pball rolls NV 5\"x20 FWD on plinth           Wrist rolls NV 5\"x20           Pulleys NV 3'           HEP education and instruction x8'                         Ther Activity                                       Gait Training                                       Modalities                                            "

## 2024-07-29 ENCOUNTER — OFFICE VISIT (OUTPATIENT)
Dept: PHYSICAL THERAPY | Facility: MEDICAL CENTER | Age: 44
End: 2024-07-29
Payer: COMMERCIAL

## 2024-07-29 DIAGNOSIS — M25.521 RIGHT ELBOW PAIN: Primary | ICD-10-CM

## 2024-07-29 PROCEDURE — 97140 MANUAL THERAPY 1/> REGIONS: CPT | Performed by: PHYSICAL THERAPIST

## 2024-07-29 PROCEDURE — 97112 NEUROMUSCULAR REEDUCATION: CPT | Performed by: PHYSICAL THERAPIST

## 2024-07-29 PROCEDURE — 97110 THERAPEUTIC EXERCISES: CPT | Performed by: PHYSICAL THERAPIST

## 2024-07-29 NOTE — PROGRESS NOTES
"Daily Note     Today's date: 2024  Patient name: Ana Stroud  : 1980  MRN: [id number]  Referring provider: Eve Garland, PT  Dx:   Encounter Diagnosis     ICD-10-CM    1. Right elbow pain  M25.521                      Subjective: Patient reports that her elbow is feeling better each day. She reports that she is able to sleep with less pain. She also notes that she has less pain in the mornings if she does her exercise routine prior to going to bed.       Objective: See treatment diary below      Assessment: Continued with STM to R distal triceps/wrist extensors to address soft tissue restrictions, and patient tolerated manual therapy well. Continued with elbow/wrist mobility and proximal neuromotor control program as outlined below. Tactile cueing provided to prevent compensation from UT/triceps during prone scapular activation interventions. However, patient demonstrated improved middle trap engagement today compared to prior sessions. Patient tolerated treatment well and completed program without pain. Patient would benefit from continued PT to further improve mobility and neuromotor control to achieve goals.      Plan: Continue per plan of care.      Precautions: avascular necrosis in L elbow/L hip/R ankle/R knee, hx of melanoma removal (), hx of benign cyst removal in R lateral upper arm (), Crohn's disease    *LATEX AND ADHESIVE ALLERGY    HEP: wrist extensor stretch, median nerve glides, table slides (EXT)  Manuals           STM to R distal triceps/wrist extensors NV KP KP                                                 Neuro Re-Ed             Median nerve glides X10 HEP x20 x20          Prone retraction  3x5 b/l 3x5 b/l          Prone ext  2x5 b/l 2x5 b/l                                                                           Ther Ex             Supine ceiling punches for EXT NV 5\"x20 wand 5\"x20 wand          Table slides for EXT 5\"x20  HEP 5\"x20 5\"x30        " "  Wrist EXT stretch 30\"x4 HEP 30\"x4 30\"x4          Pball rolls NV 5\"x20 FWD on plinth 10\"x20 FWD on plinth          Wrist rolls NV 5\"x20 5\"x20           Pulleys NV 3' 3'          HEP education and instruction x8'                         Ther Activity                                       Gait Training                                       Modalities                                              "

## 2024-08-01 ENCOUNTER — OFFICE VISIT (OUTPATIENT)
Dept: PHYSICAL THERAPY | Facility: MEDICAL CENTER | Age: 44
End: 2024-08-01
Payer: COMMERCIAL

## 2024-08-01 DIAGNOSIS — M25.521 RIGHT ELBOW PAIN: Primary | ICD-10-CM

## 2024-08-01 PROCEDURE — 97110 THERAPEUTIC EXERCISES: CPT | Performed by: PHYSICAL THERAPIST

## 2024-08-01 PROCEDURE — 97112 NEUROMUSCULAR REEDUCATION: CPT | Performed by: PHYSICAL THERAPIST

## 2024-08-01 PROCEDURE — 97140 MANUAL THERAPY 1/> REGIONS: CPT | Performed by: PHYSICAL THERAPIST

## 2024-08-01 NOTE — PROGRESS NOTES
"Daily Note     Today's date: 2024  Patient name: Ana Stroud  : 1980  MRN: [id number]  Referring provider: Eve Garland, PT  Dx:   Encounter Diagnosis     ICD-10-CM    1. Right elbow pain  M25.521                      Subjective: Patient reports that she has some soreness in her elbow today after lifting a sheet pan last night and sleeping the wrong way. She has been having less tightness in her R hand after modifying her position at work.      Objective: See treatment diary below      Assessment: Continued with STM to R distal triceps/wrist extensors to address soft tissue restrictions. Patient demonstrated slightly increased soft tissue tightness in R distal triceps today compared to last session. She responded well to manual therapy with report of decreased tightness post-tx. Continued with wrist/elbow mobility and proximal neuromotor control program. Improved MT activation noted during prone retraction. Patient tolerated treatment well and completed program without pain. Patient would benefit from continued PT to improve wrist/elbow mobility and postural control to achieve goals.       Plan: Continue per plan of care.      Precautions: avascular necrosis in L elbow/L hip/R ankle/R knee, hx of melanoma removal (), hx of benign cyst removal in R lateral upper arm (), Crohn's disease    *LATEX AND ADHESIVE ALLERGY    HEP: wrist extensor stretch, median nerve glides, table slides (EXT)  Manuals          STM to R distal triceps/wrist extensors NV KP KP KP                                                Neuro Re-Ed             Median nerve glides X10 HEP x20 x20 x20         Prone retraction  3x5 b/l 3x5 b/l 3x5 b/l         Prone ext  2x5 b/l 2x5 b/l 3x5 b/l                                                                          Ther Ex             Supine ceiling punches for EXT NV 5\"x20 wand 5\"x20 wand 5\"x30 wand         Table slides for EXT 5\"x20  HEP 5\"x20 5\"x30 5\"x30 " "        Wrist EXT stretch 30\"x4 HEP 30\"x4 30\"x4 30\"x4         Pball rolls NV 5\"x20 FWD on plinth 10\"x20 FWD on plinth 10\"x20 FWD on plinth         Wrist rolls NV 5\"x20 5\"x20  5\"x30         Pulleys NV 3' 3' 3'         HEP education and instruction x8'                         Ther Activity                                       Gait Training                                       Modalities                                                "

## 2024-08-06 ENCOUNTER — OFFICE VISIT (OUTPATIENT)
Dept: PHYSICAL THERAPY | Facility: MEDICAL CENTER | Age: 44
End: 2024-08-06
Payer: COMMERCIAL

## 2024-08-06 DIAGNOSIS — M25.521 RIGHT ELBOW PAIN: Primary | ICD-10-CM

## 2024-08-06 PROCEDURE — 97140 MANUAL THERAPY 1/> REGIONS: CPT | Performed by: PHYSICAL THERAPIST

## 2024-08-06 PROCEDURE — 97112 NEUROMUSCULAR REEDUCATION: CPT | Performed by: PHYSICAL THERAPIST

## 2024-08-06 PROCEDURE — 97110 THERAPEUTIC EXERCISES: CPT | Performed by: PHYSICAL THERAPIST

## 2024-08-06 NOTE — PROGRESS NOTES
Daily Note     Today's date: 2024  Patient name: Ana Stroud  : 1980  MRN: [id number]  Referring provider: Eve Garland, PT  Dx:   Encounter Diagnosis     ICD-10-CM    1. Right elbow pain  M25.521                      Subjective: Patient reports that she feels relief after each PT session, and she is noticing improvements in her elbow overall. She also has less tightness in her hand since doing her nerve glides. However, she has been having some pain in her elbow over the last few days, especially at night.      Objective: See treatment diary below      Assessment: Continued with manual therapy to address soft tissue restrictions in R elbow and wrist musculature, and patient responded well to manuals with report of decreased tightness post-tx. Continued with wrist/elbow mobility and proximal postural control program. Min tactile cueing provided to prevent UT compensation during prone retraction, but her ability to engage middle trap is continuing to improve. Patient responded well to session today with report of decreased pain and stiffness post-tx. Patient was educated to utilize MHP at home prior to going to bed to minimize tightness and pain at night. Patient would benefit from continued PT to improve wrist/elbow mobility and proximal neuromotor control to maximize function.      Plan: Continue per plan of care.      Precautions: avascular necrosis in L elbow/L hip/R ankle/R knee, hx of melanoma removal (), hx of benign cyst removal in R lateral upper arm (), Crohn's disease    *LATEX AND ADHESIVE ALLERGY    HEP: wrist extensor stretch, median nerve glides, table slides (EXT)  Manuals         STM to R distal triceps/wrist extensors NV KP KP KP KP                                               Neuro Re-Ed             Median nerve glides X10 HEP x20 x20 x20 x20        Prone retraction  3x5 b/l 3x5 b/l 3x5 b/l 3x5 b/l        Prone ext  2x5 b/l 2x5 b/l 3x5 b/l 3x5  "b/l                                                                         Ther Ex             Supine ceiling punches for EXT NV 5\"x20 wand 5\"x20 wand 5\"x30 wand 5\"x30 wand        Table slides for EXT 5\"x20  HEP 5\"x20 5\"x30 5\"x30 5\"x30        Wrist EXT stretch 30\"x4 HEP 30\"x4 30\"x4 30\"x4 30\"x4        Pball rolls NV 5\"x20 FWD on plinth 10\"x20 FWD on plinth 10\"x20 FWD on plinth 10\"x20 FWD on plinth        Wrist rolls NV 5\"x20 5\"x20  5\"x30 5\"x30        Pulleys NV 3' 3' 3' 3'        HEP education and instruction x8'                         Ther Activity                                       Gait Training                                       Modalities                                                  "

## 2024-08-08 ENCOUNTER — OFFICE VISIT (OUTPATIENT)
Dept: PHYSICAL THERAPY | Facility: MEDICAL CENTER | Age: 44
End: 2024-08-08
Payer: COMMERCIAL

## 2024-08-08 DIAGNOSIS — M25.521 RIGHT ELBOW PAIN: Primary | ICD-10-CM

## 2024-08-08 PROCEDURE — 97140 MANUAL THERAPY 1/> REGIONS: CPT | Performed by: PHYSICAL THERAPIST

## 2024-08-08 PROCEDURE — 97110 THERAPEUTIC EXERCISES: CPT | Performed by: PHYSICAL THERAPIST

## 2024-08-08 PROCEDURE — 97112 NEUROMUSCULAR REEDUCATION: CPT | Performed by: PHYSICAL THERAPIST

## 2024-08-08 NOTE — PROGRESS NOTES
"Daily Note     Today's date: 2024  Patient name: Ana Stroud  : 1980  MRN: [id number]  Referring provider: Eve Garland, PT  Dx:   Encounter Diagnosis     ICD-10-CM    1. Right elbow pain  M25.521                      Subjective: Patient reports that she felt some relief after last session with less pain. However, she has been having some pain at night again the past few nights.      Objective: See treatment diary below      Assessment: Continued with STM to R triceps/wrist extensors to address soft tissue restrictions, and patient responded well to manual therapy with report of decreased pain post-tx. Continued with wrist/elbow mobility and proximal postural control program. Held progressions due to baseline soreness. Significant improvement in middle trap and lower trap engagement during prone scapular activation interventions today. Increased end-range hold time during wrist rolls and table slides to further improve tissue mobility. Patient tolerated treatment well and completed program without pain. Patient would benefit from continued PT to further improve mobility and postural control to maximize function.      Plan: Continue per plan of care.      Precautions: avascular necrosis in L elbow/L hip/R ankle/R knee, hx of melanoma removal (), hx of benign cyst removal in R lateral upper arm (), Crohn's disease    *LATEX AND ADHESIVE ALLERGY    HEP: wrist extensor stretch, median nerve glides, table slides (EXT)  Manuals        STM to R distal triceps/wrist extensors NV KP KP KP KP KP                                              Neuro Re-Ed             Median nerve glides X10 HEP x20 x20 x20 x20 x20       Prone retraction  3x5 b/l 3x5 b/l 3x5 b/l 3x5 b/l 3x5 b/l       Prone ext  2x5 b/l 2x5 b/l 3x5 b/l 3x5 b/l 3x5 b/l                                                                        Ther Ex             Supine ceiling punches for EXT NV 5\"x20 wand 5\"x20 " "wand 5\"x30 wand 5\"x30 wand 5\"x30 wand       Table slides for EXT 5\"x20  HEP 5\"x20 5\"x30 5\"x30 5\"x30 10\"x20       Wrist EXT stretch 30\"x4 HEP 30\"x4 30\"x4 30\"x4 30\"x4 30\"x4       Pball rolls NV 5\"x20 FWD on plinth 10\"x20 FWD on plinth 10\"x20 FWD on plinth 10\"x20 FWD on plinth 10\"x20 FWD on plinth       Wrist rolls NV 5\"x20 5\"x20  5\"x30 5\"x30 10\"x20       Pulleys NV 3' 3' 3' 3' 5'       HEP education and instruction x8'                         Ther Activity                                       Gait Training                                       Modalities                                                    "

## 2024-08-12 ENCOUNTER — OFFICE VISIT (OUTPATIENT)
Dept: PHYSICAL THERAPY | Facility: MEDICAL CENTER | Age: 44
End: 2024-08-12
Payer: COMMERCIAL

## 2024-08-12 DIAGNOSIS — M25.521 RIGHT ELBOW PAIN: Primary | ICD-10-CM

## 2024-08-12 PROCEDURE — 97140 MANUAL THERAPY 1/> REGIONS: CPT | Performed by: PHYSICAL THERAPIST

## 2024-08-12 PROCEDURE — 97112 NEUROMUSCULAR REEDUCATION: CPT | Performed by: PHYSICAL THERAPIST

## 2024-08-12 PROCEDURE — 97110 THERAPEUTIC EXERCISES: CPT | Performed by: PHYSICAL THERAPIST

## 2024-08-12 NOTE — PROGRESS NOTES
"Daily Note     Today's date: 2024  Patient name: Ana Stroud  : 1980  MRN: [id number]  Referring provider: Eve Garland, PT  Dx:   Encounter Diagnosis     ICD-10-CM    1. Right elbow pain  M25.521                      Subjective: Patient reports that her elbow is feeling better with less pain and stiffness. She was able to do some light lifting over the past weekend when helping her family member move, and she did not have any pain.      Objective: See treatment diary below      Assessment: Continued with STM to R triceps/wrist extensors to address very mild remaining soft tissue restrictions, and patient responded well to manuals with decreased palpable soft tissue tension post-tx. Able to progress reps for prone scapular activation interventions, which demonstrates an improvement in proximal neuromotor control. Increased end-range hold time for wand FLX mobility TE to further improve flexibility. Patient tolerated treatment well and completed program without pain. Patient would benefit from continued PT to further improve wrist/elbow mobility and postural control to achieve goals.      Plan: Continue per plan of care.      Precautions: avascular necrosis in L elbow/L hip/R ankle/R knee, hx of melanoma removal (), hx of benign cyst removal in R lateral upper arm (), Crohn's disease    *LATEX AND ADHESIVE ALLERGY    HEP: wrist extensor stretch, median nerve glides, table slides (EXT)  Manuals       STM to R distal triceps/wrist extensors NV KP KP KP KP KP KP                                             Neuro Re-Ed             Median nerve glides X10 HEP x20 x20 x20 x20 x20 x20      Prone retraction  3x5 b/l 3x5 b/l 3x5 b/l 3x5 b/l 3x5 b/l 4x5 b/l      Prone ext  2x5 b/l 2x5 b/l 3x5 b/l 3x5 b/l 3x5 b/l 4x5 b/l                                                                       Ther Ex             Supine ceiling punches for EXT NV 5\"x20 wand 5\"x20 wand " "5\"x30 wand 5\"x30 wand 5\"x30 wand 10\"x30 wand      Table slides for EXT 5\"x20  HEP 5\"x20 5\"x30 5\"x30 5\"x30 10\"x20 HEP      Wrist EXT stretch 30\"x4 HEP 30\"x4 30\"x4 30\"x4 30\"x4 30\"x4 30\"x4      Pball rolls NV 5\"x20 FWD on plinth 10\"x20 FWD on plinth 10\"x20 FWD on plinth 10\"x20 FWD on plinth 10\"x20 FWD on plinth 10\"x20 FWD on plinth      Wrist rolls NV 5\"x20 5\"x20  5\"x30 5\"x30 10\"x20 10\"x20      Pulleys NV 3' 3' 3' 3' 5' 5'      HEP education and instruction x8'                         Ther Activity                                       Gait Training                                       Modalities                                                      "

## 2024-08-16 ENCOUNTER — OFFICE VISIT (OUTPATIENT)
Dept: PHYSICAL THERAPY | Facility: MEDICAL CENTER | Age: 44
End: 2024-08-16
Payer: COMMERCIAL

## 2024-08-16 DIAGNOSIS — M25.521 RIGHT ELBOW PAIN: Primary | ICD-10-CM

## 2024-08-16 PROCEDURE — 97140 MANUAL THERAPY 1/> REGIONS: CPT | Performed by: PHYSICAL THERAPIST

## 2024-08-16 PROCEDURE — 97110 THERAPEUTIC EXERCISES: CPT | Performed by: PHYSICAL THERAPIST

## 2024-08-16 PROCEDURE — 97112 NEUROMUSCULAR REEDUCATION: CPT | Performed by: PHYSICAL THERAPIST

## 2024-08-16 NOTE — PROGRESS NOTES
Daily Note     Today's date: 2024  Patient name: Ana Stroud  : 1980  MRN: [id number]  Referring provider: Eve Garland, PT  Dx:   Encounter Diagnosis     ICD-10-CM    1. Right elbow pain  M25.521                      Subjective: Patient reports that she has some soreness in her elbow today, but the region of soreness is smaller compared to previously and is more isolated to her lateral elbow. She notes that she is improving overall and feels relief after each PT session.       Objective: See treatment diary below      Assessment: Patient continues to respond well to STM to L wrist EXTs/distal triceps with report of decreased tightness post-tx. Continued with wrist/elbow mobility TE and proximal neuromotor control program as outlined below. Held progressions due to baseline soreness. Patient continues to demonstrate improvements with proximal scapular muscular activation with less compensation from UT noted. Patient tolerated treatment well and completed program without pain. Patient would benefit from continued PT to further improve mobility and proximal postural control to achieve goals.      Plan: Continue per plan of care.      Precautions: avascular necrosis in L elbow/L hip/R ankle/R knee, hx of melanoma removal (), hx of benign cyst removal in R lateral upper arm (), Crohn's disease    *LATEX AND ADHESIVE ALLERGY    HEP: wrist extensor stretch, median nerve glides, table slides (EXT)  Manuals      STM to R distal triceps/wrist extensors NV KP KP KP KP KP KP KP                                            Neuro Re-Ed             Median nerve glides X10 HEP x20 x20 x20 x20 x20 x20 x20     Prone retraction  3x5 b/l 3x5 b/l 3x5 b/l 3x5 b/l 3x5 b/l 4x5 b/l 4x5 b/l     Prone ext  2x5 b/l 2x5 b/l 3x5 b/l 3x5 b/l 3x5 b/l 4x5 b/l 4x5 b/l                                                                      Ther Ex             Supine ceiling punches for  "EXT NV 5\"x20 wand 5\"x20 wand 5\"x30 wand 5\"x30 wand 5\"x30 wand 10\"x30 wand 10\"x30 wand     Table slides for EXT 5\"x20  HEP 5\"x20 5\"x30 5\"x30 5\"x30 10\"x20 HEP      Wrist EXT stretch 30\"x4 HEP 30\"x4 30\"x4 30\"x4 30\"x4 30\"x4 30\"x4 30\"x4     Pball rolls NV 5\"x20 FWD on plinth 10\"x20 FWD on plinth 10\"x20 FWD on plinth 10\"x20 FWD on plinth 10\"x20 FWD on plinth 10\"x20 FWD on plinth 10\"x20 FWD on plinth     Wrist rolls NV 5\"x20 5\"x20  5\"x30 5\"x30 10\"x20 10\"x20 10\"x20     Pulleys NV 3' 3' 3' 3' 5' 5' 5'     HEP education and instruction x8'                         Ther Activity                                       Gait Training                                       Modalities                                                        "

## 2024-08-17 DIAGNOSIS — F51.01 PRIMARY INSOMNIA: ICD-10-CM

## 2024-08-19 ENCOUNTER — TELEPHONE (OUTPATIENT)
Age: 44
End: 2024-08-19

## 2024-08-19 ENCOUNTER — APPOINTMENT (OUTPATIENT)
Dept: PHYSICAL THERAPY | Facility: MEDICAL CENTER | Age: 44
End: 2024-08-19
Payer: COMMERCIAL

## 2024-08-19 RX ORDER — CLONAZEPAM 1 MG/1
1 TABLET ORAL
Qty: 30 TABLET | Refills: 0 | Status: SHIPPED | OUTPATIENT
Start: 2024-08-19

## 2024-08-19 NOTE — TELEPHONE ENCOUNTER
Contacted pt off [facility] wait list in regards to verify needs of service and offer an appt. LVM for pt to contact intake dept for scheduling.        First Attempt to schedule.

## 2024-08-21 ENCOUNTER — TELEPHONE (OUTPATIENT)
Dept: GASTROENTEROLOGY | Facility: CLINIC | Age: 44
End: 2024-08-21

## 2024-08-23 ENCOUNTER — OFFICE VISIT (OUTPATIENT)
Dept: PHYSICAL THERAPY | Facility: MEDICAL CENTER | Age: 44
End: 2024-08-23
Payer: COMMERCIAL

## 2024-08-23 DIAGNOSIS — M25.521 RIGHT ELBOW PAIN: Primary | ICD-10-CM

## 2024-08-23 PROCEDURE — 97112 NEUROMUSCULAR REEDUCATION: CPT | Performed by: PHYSICAL THERAPIST

## 2024-08-23 PROCEDURE — 97110 THERAPEUTIC EXERCISES: CPT | Performed by: PHYSICAL THERAPIST

## 2024-08-23 PROCEDURE — 97140 MANUAL THERAPY 1/> REGIONS: CPT | Performed by: PHYSICAL THERAPIST

## 2024-08-23 NOTE — PROGRESS NOTES
Daily Note     Today's date: 2024  Patient name: Ana Stroud  : 1980  MRN: [id number]  Referring provider: Eve Garland, PT  Dx:   Encounter Diagnosis     ICD-10-CM    1. Right elbow pain  M25.521                      Subjective: Patient reports that her elbow is feeling better, but she has been having some difficulty doing her exercises at home due to L-sided neck pain. She notes that her neck is slowly improving as well.      Objective: See treatment diary below      Assessment: Performed STM to R triceps/wrist EXTs to address soft tissue tightness, and patient continues to respond well to manual therapy with decreased palpable tissue tension post-tx. Continued with neural mobility, wrist/elbow flexibility, and proximal postural control program. Held prone scapular activation interventions on the L due to L-sided neck soreness. Added scapular retractions today to improve postural control with cueing provided to prevent triceps compensation. Patient tolerated treatment well, demonstrated appropriate fatigue post-tx, and completed program without pain. Patient would benefit from continued PT to further improve elbow mobility and postural control to achieve goals.      Plan: Continue per plan of care.      Precautions: avascular necrosis in L elbow/L hip/R ankle/R knee, hx of melanoma removal (), hx of benign cyst removal in R lateral upper arm (), Crohn's disease    *LATEX AND ADHESIVE ALLERGY    HEP: wrist extensor stretch, median nerve glides, table slides (EXT)  Manuals  8 8    STM to R distal triceps/wrist extensors NV KP KP KP KP [facility] KP                                           Neuro Re-Ed             Median nerve glides X10 HEP x20 x20 x20 x20 x20 x20 x20 x20    Prone retraction  3x5 b/l 3x5 b/l 3x5 b/l 3x5 b/l 3x5 b/l 4x5 b/l 4x5 b/l 3x10 R    Prone ext  2x5 b/l 2x5 b/l 3x5 b/l 3x5 b/l 3x5 b/l 4x5 b/l 4x5 b/l 3x10 R    Scapular  "retractions         2x10                                                        Ther Ex             Supine ceiling punches for EXT NV 5\"x20 wand 5\"x20 wand 5\"x30 wand 5\"x30 wand 5\"x30 wand 10\"x30 wand 10\"x30 wand 10\"x30 wand    Table slides for EXT 5\"x20  HEP 5\"x20 5\"x30 5\"x30 5\"x30 10\"x20 HEP      Wrist EXT stretch 30\"x4 HEP 30\"x4 30\"x4 30\"x4 30\"x4 30\"x4 30\"x4 30\"x4 30\"x4    Pball rolls NV 5\"x20 FWD on plinth 10\"x20 FWD on plinth 10\"x20 FWD on plinth 10\"x20 FWD on plinth 10\"x20 FWD on plinth 10\"x20 FWD on plinth 10\"x20 FWD on plinth 10\"x20 FWD on plinth    Wrist rolls NV 5\"x20 5\"x20  5\"x30 5\"x30 10\"x20 10\"x20 10\"x20 10\"x20    Pulleys NV 3' 3' 3' 3' 5' 5' 5' 6'    HEP education and instruction x8'                         Ther Activity                                       Gait Training                                       Modalities                                                          "

## 2024-08-23 NOTE — TELEPHONE ENCOUNTER
Contacted patient off of [facility]  to verify needs of services in attempts to offer patient an appointment. LVM for patient to contact intake dept  in regards to an appointment. Final attempt.

## 2024-08-26 ENCOUNTER — OFFICE VISIT (OUTPATIENT)
Dept: PHYSICAL THERAPY | Facility: MEDICAL CENTER | Age: 44
End: 2024-08-26
Payer: COMMERCIAL

## 2024-08-26 DIAGNOSIS — M25.521 RIGHT ELBOW PAIN: Primary | ICD-10-CM

## 2024-08-26 PROCEDURE — 97112 NEUROMUSCULAR REEDUCATION: CPT | Performed by: PHYSICAL THERAPIST

## 2024-08-26 PROCEDURE — 97110 THERAPEUTIC EXERCISES: CPT | Performed by: PHYSICAL THERAPIST

## 2024-08-26 PROCEDURE — 97140 MANUAL THERAPY 1/> REGIONS: CPT | Performed by: PHYSICAL THERAPIST

## 2024-08-26 NOTE — PROGRESS NOTES
"Daily Note     Today's date: 2024  Patient name: Ana Stroud  : 1980  MRN: [id number]  Referring provider: Eve Garland, PT  Dx:   Encounter Diagnosis     ICD-10-CM    1. Right elbow pain  M25.521                      Subjective: Patient reports that her elbow is feeling improved, and her neck is also feeling better today.      Objective: See treatment diary below      Assessment: Continued with STM to R triceps/wrist EXTs to address very mild remaining soft tissue tightness, and patient tolerated manual therapy well. She continues to demonstrate improved middle trap/lower trap activation during prone scapular activation interventions, which demonstrates an improvement in proximal neuromotor control. Patient tolerated treatment well and completed program without pain. Patient would benefit from continued PT to improve wrist/elbow flexibility and proximal postural control to achieve goals.      Plan: Continue per plan of care.      Precautions: avascular necrosis in L elbow/L hip/R ankle/R knee, hx of melanoma removal (), hx of benign cyst removal in R lateral upper arm (), Crohn's disease    *LATEX AND ADHESIVE ALLERGY    HEP: wrist extensor stretch, median nerve glides, table slides (EXT)  Manuals    STM to R distal triceps/wrist extensors NV KP KP KP KP KP KP KP KP KP                                          Neuro Re-Ed             Median nerve glides X10 HEP x20 x20 x20 x20 x20 x20 x20 x20 x20   Prone retraction  3x5 b/l 3x5 b/l 3x5 b/l 3x5 b/l 3x5 b/l 4x5 b/l 4x5 b/l 3x10 R 3x10 R   Prone ext  2x5 b/l 2x5 b/l 3x5 b/l 3x5 b/l 3x5 b/l 4x5 b/l 4x5 b/l 3x10 R 3x10 R   Scapular retractions         2x10 2x10                                                       Ther Ex             Supine ceiling punches for EXT NV 5\"x20 wand 5\"x20 wand 5\"x30 wand 5\"x30 wand 5\"x30 wand 10\"x30 wand 10\"x30 wand 10\"x30 wand 10\"x30 wand   Table slides for EXT " "5\"x20  HEP 5\"x20 5\"x30 5\"x30 5\"x30 10\"x20 HEP      Wrist EXT stretch 30\"x4 HEP 30\"x4 30\"x4 30\"x4 30\"x4 30\"x4 30\"x4 30\"x4 30\"x4 30\"x4   Pball rolls NV 5\"x20 FWD on plinth 10\"x20 FWD on plinth 10\"x20 FWD on plinth 10\"x20 FWD on plinth 10\"x20 FWD on plinth 10\"x20 FWD on plinth 10\"x20 FWD on plinth 10\"x20 FWD on plinth 10\"x30 FWD on plinth   Wrist rolls NV 5\"x20 5\"x20  5\"x30 5\"x30 10\"x20 10\"x20 10\"x20 10\"x20 10\"x30   Pulleys NV 3' 3' 3' 3' 5' 5' 5' 6' 6'   HEP education and instruction x8'                         Ther Activity                                       Gait Training                                       Modalities                                                            "

## 2024-08-30 ENCOUNTER — EVALUATION (OUTPATIENT)
Dept: PHYSICAL THERAPY | Facility: MEDICAL CENTER | Age: 44
End: 2024-08-30
Payer: COMMERCIAL

## 2024-08-30 DIAGNOSIS — M25.521 RIGHT ELBOW PAIN: Primary | ICD-10-CM

## 2024-08-30 PROCEDURE — 97110 THERAPEUTIC EXERCISES: CPT | Performed by: PHYSICAL THERAPIST

## 2024-08-30 PROCEDURE — 97140 MANUAL THERAPY 1/> REGIONS: CPT | Performed by: PHYSICAL THERAPIST

## 2024-08-30 PROCEDURE — 97112 NEUROMUSCULAR REEDUCATION: CPT | Performed by: PHYSICAL THERAPIST

## 2024-08-30 NOTE — PROGRESS NOTES
Discharge Summary    Today's date: 2024  Patient name: Ana Stroud  : 1980  MRN: [id number]  Referring provider: Eve Garland, PT  Dx:   Encounter Diagnosis     ICD-10-CM    1. Right elbow pain  M25.521                      Subjective: Patient reports that her elbow is feeling significantly improved. She is able to sleep without limitation, and she is now able to lift objects and open doors without difficulty. She did not attempt returning to yoga yet, but she is very pleased with her overall progress. She is able to complete all of her daily tasks, and she feels confident in her home exercise program. She feels ready to be discharged to her HEP.      Objective: See treatment diary below      Assessment: Patient has demonstrated excellent progress with reducing the soft tissue tightness in her R triceps and wrist extensors and improving her R median neural mobility, which has restored her ability to reach and has facilitated a reduction in her pain level at night. Patient has also demonstrated good progress with improving the strength of her scapular stabilizers, which has improved her postural control and has reduced the compensatory stress on her R elbow when lifting objects. Patient has met all of her goals for PT except for returning to yoga because she did not attempt. However, patient is independent in an illustrated HEP for continued elbow mobility, neural mobility, and postural control. Patient tolerated treatment well today and completed program without pain. Patient is agreeable to be discharged to her HEP.    Goals  STG:  Patient will be independent with home exercise program.- met   Patient will be able to perform a proper scapular retraction to demonstrate improved postural control for lifting.- met  LTG:  Patient will demonstrate at least 50% reduction in palpable soft tissue tension in R triceps/wrist extensors to maximize ability to utilize R elbow during ADL.- met  Patient  "will be able to sleep without R elbow pain.- met  Patient will return to yoga with modifications as necessary.- not met (pt did not attempt; independent in an illustrated HEP for continued elbow mobility and postural control)  Patient will be able to manage symptoms independently.- met     Plan: Discharge to HEP.     Precautions: avascular necrosis in L elbow/L hip/R ankle/R knee, hx of melanoma removal (2008), hx of benign cyst removal in R lateral upper arm (2021), Crohn's disease    *LATEX AND ADHESIVE ALLERGY    HEP: wrist extensor stretch, median nerve glides, table slides (EXT)  Manuals 7/22 7/25 7/29 8/1 8/6 8/8 8/12 8/16 8/23 8/26 8/30   STM to R distal triceps/wrist extensors NV KP KP KP KP KP KP KP KP KP KP                                             Neuro Re-Ed              Median nerve glides X10 HEP x20 x20 x20 x20 x20 x20 x20 x20 x20 x20   Prone retraction  3x5 b/l 3x5 b/l 3x5 b/l 3x5 b/l 3x5 b/l 4x5 b/l 4x5 b/l 3x10 R 3x10 R 3x10 R   Prone ext  2x5 b/l 2x5 b/l 3x5 b/l 3x5 b/l 3x5 b/l 4x5 b/l 4x5 b/l 3x10 R 3x10 R 3x10 R   Scapular retractions         2x10 2x10 2x10                                                           Ther Ex              Supine ceiling punches for EXT NV 5\"x20 wand 5\"x20 wand 5\"x30 wand 5\"x30 wand 5\"x30 wand 10\"x30 wand 10\"x30 wand 10\"x30 wand 10\"x30 wand 10\"x30 wand   Table slides for EXT 5\"x20  HEP 5\"x20 5\"x30 5\"x30 5\"x30 10\"x20 HEP       Wrist EXT stretch 30\"x4 HEP 30\"x4 30\"x4 30\"x4 30\"x4 30\"x4 30\"x4 30\"x4 30\"x4 30\"x4 30\"x4   Pball rolls NV 5\"x20 FWD on plinth 10\"x20 FWD on plinth 10\"x20 FWD on plinth 10\"x20 FWD on plinth 10\"x20 FWD on plinth 10\"x20 FWD on plinth 10\"x20 FWD on plinth 10\"x20 FWD on plinth 10\"x30 FWD on plinth 10\"x30 FWD on plinth   Wrist rolls NV 5\"x20 5\"x20  5\"x30 5\"x30 10\"x20 10\"x20 10\"x20 10\"x20 10\"x30 10\"x30   Pulleys NV 3' 3' 3' 3' 5' 5' 5' 6' 6' 6'   HEP education and instruction x8'                           Ther Activity                                  "         Gait Training                                          Modalities

## 2024-10-27 DIAGNOSIS — N80.9 ENDOMETRIOSIS: ICD-10-CM

## 2024-10-28 RX ORDER — NORGESTIMATE AND ETHINYL ESTRADIOL 0.25-0.035
1 KIT ORAL DAILY
Qty: 112 TABLET | Refills: 0 | Status: SHIPPED | OUTPATIENT
Start: 2024-10-28

## 2024-11-19 ENCOUNTER — APPOINTMENT (OUTPATIENT)
Dept: LAB | Facility: MEDICAL CENTER | Age: 44
End: 2024-11-19
Payer: COMMERCIAL

## 2024-11-19 ENCOUNTER — OFFICE VISIT (OUTPATIENT)
Dept: OBGYN CLINIC | Facility: MEDICAL CENTER | Age: 44
End: 2024-11-19
Payer: COMMERCIAL

## 2024-11-19 VITALS
DIASTOLIC BLOOD PRESSURE: 74 MMHG | BODY MASS INDEX: 26.99 KG/M2 | HEIGHT: 65 IN | WEIGHT: 162 LBS | SYSTOLIC BLOOD PRESSURE: 118 MMHG

## 2024-11-19 DIAGNOSIS — N93.9 ABNORMAL UTERINE BLEEDING (AUB): ICD-10-CM

## 2024-11-19 DIAGNOSIS — Z01.419 WELL WOMAN EXAM WITH ROUTINE GYNECOLOGICAL EXAM: Primary | ICD-10-CM

## 2024-11-19 DIAGNOSIS — Z12.11 COLON CANCER SCREENING: ICD-10-CM

## 2024-11-19 DIAGNOSIS — Z12.31 BREAST CANCER SCREENING BY MAMMOGRAM: ICD-10-CM

## 2024-11-19 LAB
ESTRADIOL SERPL-MCNC: <15 PG/ML
FSH SERPL-ACNC: 0.7 MIU/ML
TSH SERPL DL<=0.05 MIU/L-ACNC: 0.45 UIU/ML (ref 0.45–4.5)

## 2024-11-19 PROCEDURE — 83001 ASSAY OF GONADOTROPIN (FSH): CPT

## 2024-11-19 PROCEDURE — S0612 ANNUAL GYNECOLOGICAL EXAMINA: HCPCS | Performed by: OBSTETRICS & GYNECOLOGY

## 2024-11-19 PROCEDURE — G0476 HPV COMBO ASSAY CA SCREEN: HCPCS | Performed by: OBSTETRICS & GYNECOLOGY

## 2024-11-19 PROCEDURE — 84443 ASSAY THYROID STIM HORMONE: CPT

## 2024-11-19 PROCEDURE — 36415 COLL VENOUS BLD VENIPUNCTURE: CPT

## 2024-11-19 PROCEDURE — 82670 ASSAY OF TOTAL ESTRADIOL: CPT

## 2024-11-19 PROCEDURE — G0145 SCR C/V CYTO,THINLAYER,RESCR: HCPCS | Performed by: OBSTETRICS & GYNECOLOGY

## 2024-11-19 NOTE — PROGRESS NOTES
Assessment   44 y.o.  presenting for annual exam.     Plan   Diagnoses and all orders for this visit:    Well woman exam with routine gynecological exam  -     Liquid-based pap, screening  -     HPV High Risk  - Mammo ordered  - Colonoscopy current  - Return in 1yr for yearly    Breast cancer screening by mammogram  -     Mammo screening bilateral w 3d and cad; Future    Colon cancer screening  - Follows with GI for crohns  - Up to date    Abnormal uterine bleeding (AUB)  -     US pelvis complete w transvaginal; Future  -     TSH, 3rd generation with Free T4 reflex; Future  -     Follicle stimulating hormone; Future  -     Estradiol; Future    __________________________________________________________________      Subjective     Ana SEJAL Stroud is a 44 y.o.  with regular menses who is sexually active and currently using contraception (cOCP, also s/p BTL) presenting for annual exam.      Irregular bleeding noted on cOCP. Has been using continuously as usual except for noted below. Reports that since July, has spotting mid-cycle. Over time, has become a full menstrual-like flow. In July, took a 5d pill-free interval to attempt to manage. Continued to have irregularity so did similar in October. After this, has been bleeding somewhat continuously since 10/29. Light but persistent. No missed pills, consistent with timing. Some odor to the brown spotting. Does not note it any other times. Mother with hx hysterectomy for fibroids. Only notable change recently is more frequent BMs. Hx steroid suppository and bentyl use with crohns symptoms; no systemic steroid use.      GYN  Complaints: denies  Denies change in menstrual cycle, dysmenorrhea, dyspareunia, genital discharge, genital ulcers, irregular/heavy menses, pelvic pain and vulvar/vaginal symptoms  Menarche at age 11  Dysmenorrhea: less cramping.   Cyclic symptoms include breast tenderness, acne  Sexually active: Yes - single partner - male  Hx STI:  denies   Hx Abnormal pap: denies  Last pap:  - NILM, HPV neg     OB     Pregnancy complications: triplets  All 3 in college (2 at [facility], 1 in [place])  S/p salpingectomy for sterilization        Complaints: denies  Denies urinary frequency, hematuria, urinary incontinence and dysuria     BREAST  Complaints: denies  Denies: breast lump, breast tenderness, changed mole, dryness, pruritus, rash, skin color change and skin lesion(s)  Last mammogram:  - birads1  Personal hx: denies  Family hx: denies fhx of breast, uterine, ovarian cancers  Grandmother with colon ca (60s, paternal)  Mother with colon ca (61yo)  Son with bipolar 1 (dx )  Patient does do regular self-exams     GENERAL  PMH reviewed/updated and is as below.   Patient does follow with a PCP.  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  Denies domestic violence.  Exercise: barre3/dance exercises   Diet: nothing specific     SCREENING  Cervical Ca: pap collected  Breast Ca: mammo slip given, clinicians breast exam done  Colon Ca:  - colonoscopy - repeat 1yr (follows due to crohns, new changes and rec CT 6mo/ cscope in 1yr as opposed to 1 & 2yr as typical)  Genetics:  - helix - negative      Past Medical History:   Diagnosis Date    Anxiety     Arthritis     assoc w Crohn's    Avascular necrosis (HCC)     right ankle, right hip, left elbow    Cancer (HCC)     melanoma    Crohn's disease (HCC)     Depression     Disease of thyroid gland 2003    during pregnancy    Endometriosis     Female infertility     GERD (gastroesophageal reflux disease)     Gestational diabetes mellitus     Gestational    Inflammatory bowel disease     Malignant melanoma (HCC)     on back, had wide excision    Orbital myositis     Bilateral    PONV (postoperative nausea and vomiting)     Right ovarian cyst 2020    Thyroid disease     nodules       Past Surgical History:   Procedure Laterality Date    ABDOMINOPLASTY       SECTION       CHOLECYSTECTOMY  2022    CYSTOSCOPY N/A 06/22/2020    Procedure: CYSTOSCOPY;  Surgeon: Katherine Wong MD;  Location: [facility];  Service: Gynecology    DENTAL SURGERY      DIAGNOSTIC LAPAROSCOPY  06/2003    for endometriosis    DILATION AND CURETTAGE OF UTERUS      EGD      HERNIA REPAIR  2007    KNEE SURGERY Right     torn meniscus    OOPHORECTOMY Right     June 2020    DC LAPAROSCOPY SURG CHOLECYSTECTOMY N/A 06/30/2022    Procedure: LAP KENIA W/ ROBOTICS;  Surgeon: Erwin Mariscal MD;  [facility];  Service: General    DC LAPAROSCOPY W/RMVL ADNEXAL STRUCTURES Bilateral 06/22/2020    Procedure: SALPINGECTOMY;  Surgeon: Katherine Wong MD;  [facility];  Service: Gynecology    DC LAPAROSCOPY W/RMVL ADNEXAL STRUCTURES Right 06/22/2020    Procedure: OOPHORECTOMY;  Surgeon: Katherine Wong MD;  [facility];  Service: Gynecology    DC LAPS ABD PRTM&OMENTUM DX W/WO SPEC BR/WA SPX N/A 06/22/2020    Procedure: DX LAP;  Surgeon: Katherine Wong MD;  [facility];  Service: Gynecology    DC LAPS FULG/EXC OVARY VISCERA/PERITONEAL SURFACE N/A 06/22/2020    Procedure: FULG OF ENDOMETRIOSIS;  Surgeon: Katherine Wong MD;  [facility];  Service: Gynecology    SALPINGECTOMY Bilateral     SKIN LESION EXCISION  2008    melanoma of back    TUBAL LIGATION           Current Outpatient Medications:     Calcium Carbonate-Vitamin D (CALCIUM PLUS VITAMIN D PO), Take by mouth daily , Disp: , Rfl:     clonazePAM (KlonoPIN) 1 mg tablet, Take 1 tablet (1 mg total) by mouth daily at bedtime, Disp: 30 tablet, Rfl: 0    dicyclomine (BENTYL) 20 mg tablet, Take 1 tablet (20 mg total) by mouth every 6 (six) hours As needed for abdominal cramping, Disp: 120 tablet, Rfl: 1    hydrocortisone (ANUSOL-HC) 25 mg suppository, Insert 1 suppository (25 mg total) into the rectum 2 (two) times a day, Disp: 12 suppository, Rfl: 0    Multiple Vitamins-Minerals (WOMENS MULTIVITAMIN) TABS, Take 1 tablet by  "mouth daily , Disp: , Rfl:     norgestimate-ethinyl estradiol (Sprintec 28) 0.25-35 MG-MCG per tablet, Take 1 tablet by mouth daily . Skip inactive pills and start new pack immediately., Disp: 112 tablet, Rfl: 0    Allergies   Allergen Reactions    Other Anaphylaxis     Annotation - [id number]: [place] bug spray    Scopolamine Visual Disturbance    Latex Rash    Tape [Medical Tape] Rash       Social History     Tobacco Use    Smoking status: Never     Passive exposure: Never    Smokeless tobacco: Never   Vaping Use    Vaping status: Never Used   Substance Use Topics    Alcohol use: Yes     Alcohol/week: 1.0 standard drink of alcohol     Types: 1 Glasses of wine per week     Comment: Social use    Drug use: Never             Objective  /74   Ht 5' 5\" (1.651 m)   Wt 73.5 kg (162 lb)   LMP 10/29/2024 (Exact Date)   BMI 26.96 kg/m²      Physical Exam:  Physical Exam  Exam conducted with a chaperone present.   Constitutional:       General: She is not in acute distress.     Appearance: Normal appearance. She is well-developed. She is not ill-appearing, toxic-appearing or diaphoretic.   HENT:      Head: Normocephalic and atraumatic.   Eyes:      General: No scleral icterus.        Right eye: No discharge.         Left eye: No discharge.      Conjunctiva/sclera: Conjunctivae normal.   Cardiovascular:      Rate and Rhythm: Normal rate.   Pulmonary:      Effort: Pulmonary effort is normal. No accessory muscle usage or respiratory distress.   Chest:   Breasts:     Breasts are symmetrical.      Right: No inverted nipple, mass, nipple discharge, skin change or tenderness.      Left: No inverted nipple, mass, nipple discharge, skin change or tenderness.   Abdominal:      General: There is no distension.      Palpations: Abdomen is soft. There is no mass.      Tenderness: There is no abdominal tenderness. There is no guarding or rebound.   Genitourinary:     General: Normal vulva.      Exam position: Lithotomy position. "      Labia:         Right: No rash, tenderness or lesion.         Left: No rash, tenderness or lesion.       Urethra: No prolapse, urethral swelling or urethral lesion.      Vagina: No signs of injury. Bleeding present. No vaginal discharge, erythema, tenderness or lesions.      Cervix: No cervical motion tenderness, discharge, friability, lesion or erythema.      Uterus: Not enlarged, not fixed and not tender.       Adnexa:         Right: No mass, tenderness or fullness.          Left: No mass, tenderness or fullness.        Rectum: No external hemorrhoid. Normal anal tone.   Lymphadenopathy:      Upper Body:      Right upper body: No axillary or pectoral adenopathy.      Left upper body: No axillary or pectoral adenopathy.   Skin:     General: Skin is warm and dry.      Findings: No erythema or rash.   Neurological:      Mental Status: She is alert.   Psychiatric:         Mood and Affect: Mood normal.         Behavior: Behavior normal.         Thought Content: Thought content normal.         Judgment: Judgment normal.

## 2024-11-25 ENCOUNTER — HOSPITAL ENCOUNTER (OUTPATIENT)
Dept: ULTRASOUND IMAGING | Facility: HOSPITAL | Age: 44
Discharge: HOME | End: 2024-11-25
Attending: OBSTETRICS & GYNECOLOGY
Payer: COMMERCIAL

## 2024-11-25 DIAGNOSIS — N93.9 ABNORMAL UTERINE BLEEDING (AUB): ICD-10-CM

## 2024-11-25 LAB
LAB AP GYN PRIMARY INTERPRETATION: NORMAL
Lab: NORMAL

## 2024-11-25 PROCEDURE — 76856 US EXAM PELVIC COMPLETE: CPT

## 2024-11-25 PROCEDURE — 76830 TRANSVAGINAL US NON-OB: CPT

## 2024-11-27 ENCOUNTER — RESULTS FOLLOW-UP (OUTPATIENT)
Dept: OBGYN CLINIC | Facility: MEDICAL CENTER | Age: 44
End: 2024-11-27

## 2024-11-27 DIAGNOSIS — N93.9 ABNORMAL UTERINE BLEEDING (AUB): Primary | ICD-10-CM

## 2024-11-27 NOTE — TELEPHONE ENCOUNTER
Pt is aware of results,pt agrees to stop taking BC ,due to her bleeding anyway,she is aware to have labs rechecked at 6 wks of stopping BC,labs in system already,pt also would like to speak with provider,msg routed to her.

## 2024-11-27 NOTE — RESULT ENCOUNTER NOTE
Please call pt to discuss results    Your pap is normal.     Your hormonal labs are inconclusive. Your TSH is normal. Your FSH is in the premenopausal range but your estradiol level is suppressed, which is more typical for post-menopause. The FSH is the level that can be most easily affected by birth control use. I suspect this means at minimum you are likely to be shaan-menopausal.     The next step in determination typically involves stopping OCP to reassess baseline menses and recheck labs off meds. However, she is using this for medical suppression of endometriosis (s/p tubal so ok contraception wise), . If she feels comfortable attempting, recommend stopping for 2-3mo, recheck labs after at least 6wk off pill (FSH, estradiol). If unsure, I recommend consultation so we can discuss in detail.

## 2024-11-27 NOTE — TELEPHONE ENCOUNTER
----- Message from Katherine Wong MD sent at 11/27/2024  9:36 AM EST -----  Please call pt to discuss results    Your pap is normal.     Your hormonal labs are inconclusive. Your TSH is normal. Your FSH is in the premenopausal range but your estradiol level is suppressed, which is more typical for post-menopause. The FSH is the level that can be most easily affected by birth control use. I suspect this means at minimum you are likely to be shaan-menopausal.     The next step in determination typically involves stopping OCP to reassess baseline menses and recheck labs off meds. However, she is using this for medical suppression of endometriosis (s/p tubal so ok contraception wise), . If she feels comfortable attempting, recommend stopping for 2-3mo, recheck labs after at least 6wk off pill (FSH, estradiol). If unsure, I recommend consultation so we can discuss in detail.

## 2024-12-13 ENCOUNTER — TELEMEDICINE (OUTPATIENT)
Dept: OBGYN CLINIC | Facility: CLINIC | Age: 44
End: 2024-12-13
Payer: COMMERCIAL

## 2024-12-13 DIAGNOSIS — N89.8 VAGINAL DRYNESS: ICD-10-CM

## 2024-12-13 DIAGNOSIS — N93.9 ABNORMAL UTERINE BLEEDING (AUB): Primary | ICD-10-CM

## 2024-12-13 DIAGNOSIS — N80.03 ADENOMYOSIS: ICD-10-CM

## 2024-12-13 DIAGNOSIS — N80.9 ENDOMETRIOSIS DETERMINED BY LAPAROSCOPY: ICD-10-CM

## 2024-12-13 DIAGNOSIS — N95.1 PERIMENOPAUSE: ICD-10-CM

## 2024-12-13 PROCEDURE — 99214 OFFICE O/P EST MOD 30 MIN: CPT | Performed by: OBSTETRICS & GYNECOLOGY

## 2024-12-13 NOTE — PROGRESS NOTES
Virtual Regular Visit  Name: Ana Stroud      : 1980      MRN: [id number]  Encounter Provider: Katherine Wong MD  Encounter Date: 2024   Encounter department: [facility] [place]      Assessment & Plan  Abnormal uterine bleeding (AUB)  Adenomyosis  Endometriosis  Perimenopause    Orders:    CBC and Platelet; Future    Ferritin; Future  - Repeat FSH, E2 off OCP    Vaginal dryness    Orders:    estradiol (ESTRACE VAGINAL) 0.1 mg/g vaginal cream; Apply 1g into vagina nightly for 1 week, then apply every 3rd night    ___________________________________      Encounter provider Katherine Wong MD    The patient was identified by name and date of birth. Ana Stroud was informed that this is a telemedicine visit and that the visit is being conducted through the Epic Embedded platform. She agrees to proceed..  My office door was closed. No one else was in the room.  She acknowledged consent and understanding of privacy and security of the video platform. The patient has agreed to participate and understands they can discontinue the visit at any time.    Verification of patient location:  Patient is located at Home in the following state in which I hold an active license [place] :    Patient is aware this is a billable service.     History of Present Illness       Subjective:  Ms Stroud is a 44 y.o.  presenting to discuss AUB, recent US testing.    Pt reports stopped birth control as discussed around , but still took a  few weeks to fully stop bleeding. Heavy initially. Reports if didn't work from home, unsure how she would have left the house with flow that bad. Saturating coverage and passing clots. Increasing fatigue with time. Denies dizziness, palpitations, sob. Minimal cramping/pelvic pain throughout. Recent hx notable for increasing vaginal dryness, worse since stopping OCP. Has been using coconut oil for relief, but often not  enough.    Reviewed symptoms, labs, and pelvic US in detail. Discussed symptoms and suppressed estrogens are suspicious for perimenopause, which may indicate OCP as etiology of bleeding concerns. Discussed trial off with repeat labs may offer confirmation. Discussed risk/benefit of this intervention in terms of endometriosis symptoms, and may require mgmt in short term during trial. Reviewed adenomyosis finding, which can contribute to menstrual heaviness/discomfort. Not unexpected with known hx endo and alone would not be clear source of prolonged flow. Discussed mgmt options for dryness and agreeable to topical estrogens.       HPI  Review of Systems   Constitutional:  Positive for fatigue. Negative for chills and fever.   Respiratory:  Negative for shortness of breath.    Cardiovascular:  Negative for chest pain and palpitations.   Gastrointestinal:  Negative for abdominal distention, abdominal pain, constipation, diarrhea, nausea and vomiting.   Genitourinary:  Positive for menstrual problem. Negative for dysuria, flank pain, frequency, pelvic pain, urgency, vaginal bleeding, vaginal discharge and vaginal pain.   Neurological:  Negative for dizziness, light-headedness and headaches.   Psychiatric/Behavioral:  The patient is nervous/anxious.        Objective   LMP 10/29/2024 (Exact Date)     Physical Exam  Constitutional:       General: She is not in acute distress.     Appearance: Normal appearance. She is well-developed. She is not ill-appearing, toxic-appearing or diaphoretic.   Eyes:      General: No scleral icterus.        Right eye: No discharge.         Left eye: No discharge.      Conjunctiva/sclera: Conjunctivae normal.   Pulmonary:      Effort: Pulmonary effort is normal. No respiratory distress.   Skin:     Coloration: Skin is not jaundiced or pale.      Findings: No bruising or erythema.   Neurological:      Mental Status: She is alert.   Psychiatric:         Behavior: Behavior normal.          Reviewed  FSH, LH, E2, TSH (11/19/24)  Pelvic US 11/25/24      Visit Time  Total Visit Duration: 15min

## 2024-12-17 ENCOUNTER — HOSPITAL ENCOUNTER (OUTPATIENT)
Dept: CT IMAGING | Facility: HOSPITAL | Age: 44
Discharge: HOME | End: 2024-12-17
Attending: INTERNAL MEDICINE
Payer: COMMERCIAL

## 2024-12-17 DIAGNOSIS — Z87.19 HISTORY OF CROHN'S DISEASE: ICD-10-CM

## 2024-12-17 PROCEDURE — 74177 CT ABD & PELVIS W/CONTRAST: CPT

## 2024-12-17 RX ADMIN — IOHEXOL 100 ML: 350 INJECTION, SOLUTION INTRAVENOUS at 08:17

## 2024-12-24 ENCOUNTER — RESULTS FOLLOW-UP (OUTPATIENT)
Dept: GASTROENTEROLOGY | Facility: MEDICAL CENTER | Age: 44
End: 2024-12-24

## 2024-12-24 NOTE — RESULT ENCOUNTER NOTE
Results relayed via [facility].  No evidence of active Crohn's disease    kings Esteban this is a CT scan result for mutual patient.  Her Crohn's is in remission, but CT also shows stable left adrenal nodule which appears to be benign.  The CT report recommended biochemical evaluation to rule out a functioning adenoma if she has not already had this in the past.

## 2024-12-27 ENCOUNTER — OFFICE VISIT (OUTPATIENT)
Age: 44
End: 2024-12-27
Payer: COMMERCIAL

## 2024-12-27 ENCOUNTER — APPOINTMENT (OUTPATIENT)
Dept: LAB | Facility: MEDICAL CENTER | Age: 44
End: 2024-12-27
Payer: COMMERCIAL

## 2024-12-27 VITALS
BODY MASS INDEX: 26.33 KG/M2 | TEMPERATURE: 98.3 F | DIASTOLIC BLOOD PRESSURE: 76 MMHG | HEIGHT: 65 IN | SYSTOLIC BLOOD PRESSURE: 120 MMHG | WEIGHT: 158 LBS | OXYGEN SATURATION: 98 % | HEART RATE: 104 BPM

## 2024-12-27 DIAGNOSIS — E27.9 ADRENAL NODULE (HCC): Primary | ICD-10-CM

## 2024-12-27 DIAGNOSIS — R10.9 RIGHT FLANK PAIN: ICD-10-CM

## 2024-12-27 LAB
BACTERIA UR QL AUTO: ABNORMAL /HPF
BILIRUB UR QL STRIP: NEGATIVE
CAOX CRY URNS QL MICRO: ABNORMAL /HPF
CLARITY UR: CLEAR
COLOR UR: YELLOW
GLUCOSE UR STRIP-MCNC: NEGATIVE MG/DL
HGB UR QL STRIP.AUTO: NEGATIVE
KETONES UR STRIP-MCNC: NEGATIVE MG/DL
LEUKOCYTE ESTERASE UR QL STRIP: NEGATIVE
MUCOUS THREADS UR QL AUTO: ABNORMAL
NITRITE UR QL STRIP: NEGATIVE
NON-SQ EPI CELLS URNS QL MICRO: ABNORMAL /HPF
PH UR STRIP.AUTO: 6 [PH]
PROT UR STRIP-MCNC: ABNORMAL MG/DL
RBC #/AREA URNS AUTO: ABNORMAL /HPF
SP GR UR STRIP.AUTO: 1.02 (ref 1–1.03)
UROBILINOGEN UR STRIP-ACNC: <2 MG/DL
WBC #/AREA URNS AUTO: ABNORMAL /HPF

## 2024-12-27 PROCEDURE — 87086 URINE CULTURE/COLONY COUNT: CPT | Performed by: FAMILY MEDICINE

## 2024-12-27 PROCEDURE — 99214 OFFICE O/P EST MOD 30 MIN: CPT | Performed by: FAMILY MEDICINE

## 2024-12-27 PROCEDURE — 81001 URINALYSIS AUTO W/SCOPE: CPT | Performed by: FAMILY MEDICINE

## 2024-12-27 NOTE — PROGRESS NOTES
"Assessment/Plan:       Diagnoses and all orders for this visit:    Adrenal nodule (HCC)  -     Comprehensive metabolic panel; Future  -     CBC and differential; Future  -     TSH, 3rd generation with Free T4 reflex; Future  -     Lipid panel; Future  -     Cortisol, Free, Urine, 24 Hour; Future  -     Aldosterone/Renin Ratio; Future  -     Cortisol Level, AM Specimen; Future  -     MRI abdomen wo contrast; Future    Right flank pain  Comments:  Check UA CNS            Subjective:        Patient ID: Ana Stroud is a 44 y.o. female.      Patient is here to follow-up on abnormal CAT scan.  Patient with ongoing palpitations.  No significant change urination but patient does have some right flank pain.  Recent CAT scan showed left adrenal nodule.          The following portions of the patient's history were reviewed and updated as appropriate: allergies, current medications, past family history, past medical history, past social history, past surgical history and problem list.      Review of Systems   Constitutional: Negative.    HENT:  Positive for tinnitus.    Eyes: Negative.    Respiratory: Negative.     Cardiovascular: Negative.    Gastrointestinal:  Positive for abdominal pain.   Endocrine: Negative.    Genitourinary:  Positive for flank pain.   Skin: Negative.    Allergic/Immunologic: Negative.    Neurological: Negative.    Hematological: Negative.    Psychiatric/Behavioral: Negative.             Objective:        Depression Screening and Follow-up Plan: Patient was screened for depression during today's encounter. They screened negative with a PHQ-2 score of 0.            /76 (BP Location: Right arm, Patient Position: Sitting, Cuff Size: Standard)   Pulse 104   Temp 98.3 °F (36.8 °C) (Temporal)   Ht 5' 5\" (1.651 m)   Wt 71.7 kg (158 lb)   SpO2 98%   BMI 26.29 kg/m²          Physical Exam  Vitals and nursing note reviewed.   Constitutional:       General: She is not in acute distress.     " Appearance: Normal appearance. She is not ill-appearing, toxic-appearing or diaphoretic.   HENT:      Head: Normocephalic and atraumatic.      Right Ear: Tympanic membrane, ear canal and external ear normal. There is no impacted cerumen.      Left Ear: Tympanic membrane, ear canal and external ear normal. There is no impacted cerumen.      Nose: Nose normal. No congestion or rhinorrhea.      Mouth/Throat:      Mouth: Mucous membranes are moist.      Pharynx: No oropharyngeal exudate or posterior oropharyngeal erythema.   Eyes:      General: No scleral icterus.        Right eye: No discharge.         Left eye: No discharge.   Cardiovascular:      Rate and Rhythm: Normal rate and regular rhythm.      Pulses: Normal pulses.      Heart sounds: Normal heart sounds. No murmur heard.     No friction rub. No gallop.   Pulmonary:      Effort: Pulmonary effort is normal. No respiratory distress.      Breath sounds: Normal breath sounds. No stridor. No wheezing, rhonchi or rales.   Chest:      Chest wall: No tenderness.   Musculoskeletal:         General: Tenderness present. No swelling, deformity or signs of injury.      Cervical back: Normal range of motion and neck supple. No rigidity. No muscular tenderness.      Right lower leg: No edema.      Left lower leg: No edema.   Lymphadenopathy:      Cervical: No cervical adenopathy.   Skin:     General: Skin is warm and dry.      Capillary Refill: Capillary refill takes less than 2 seconds.      Coloration: Skin is not jaundiced.      Findings: No bruising, erythema, lesion or rash.   Neurological:      Mental Status: She is alert and oriented to person, place, and time.      Cranial Nerves: No cranial nerve deficit.      Sensory: No sensory deficit.      Motor: No weakness.      Coordination: Coordination normal.      Gait: Gait normal.   Psychiatric:         Mood and Affect: Mood normal.         Behavior: Behavior normal.         Thought Content: Thought content normal.          Judgment: Judgment normal.

## 2024-12-28 LAB — BACTERIA UR CULT: ABNORMAL

## 2024-12-30 ENCOUNTER — TELEPHONE (OUTPATIENT)
Age: 44
End: 2024-12-30

## 2025-01-03 ENCOUNTER — APPOINTMENT (OUTPATIENT)
Dept: LAB | Facility: MEDICAL CENTER | Age: 45
End: 2025-01-03
Payer: COMMERCIAL

## 2025-01-03 DIAGNOSIS — N93.9 ABNORMAL UTERINE BLEEDING (AUB): ICD-10-CM

## 2025-01-03 DIAGNOSIS — E27.9 ADRENAL NODULE (HCC): ICD-10-CM

## 2025-01-03 LAB
ALBUMIN SERPL BCG-MCNC: 3.9 G/DL (ref 3.5–5)
ALP SERPL-CCNC: 85 U/L (ref 34–104)
ALT SERPL W P-5'-P-CCNC: 21 U/L (ref 7–52)
ANION GAP SERPL CALCULATED.3IONS-SCNC: 8 MMOL/L (ref 4–13)
AST SERPL W P-5'-P-CCNC: 23 U/L (ref 13–39)
BASOPHILS # BLD AUTO: 0.03 THOUSANDS/ΜL (ref 0–0.1)
BASOPHILS NFR BLD AUTO: 1 % (ref 0–1)
BILIRUB SERPL-MCNC: 0.64 MG/DL (ref 0.2–1)
BUN SERPL-MCNC: 10 MG/DL (ref 5–25)
CALCIUM SERPL-MCNC: 9.1 MG/DL (ref 8.4–10.2)
CHLORIDE SERPL-SCNC: 102 MMOL/L (ref 96–108)
CHOLEST SERPL-MCNC: 203 MG/DL (ref ?–200)
CO2 SERPL-SCNC: 26 MMOL/L (ref 21–32)
CORTIS AM PEAK SERPL-MCNC: 12.4 UG/DL (ref 6.7–22.6)
CREAT SERPL-MCNC: 0.73 MG/DL (ref 0.6–1.3)
EOSINOPHIL # BLD AUTO: 0.19 THOUSAND/ΜL (ref 0–0.61)
EOSINOPHIL NFR BLD AUTO: 3 % (ref 0–6)
ERYTHROCYTE [DISTWIDTH] IN BLOOD BY AUTOMATED COUNT: 12.6 % (ref 11.6–15.1)
GFR SERPL CREATININE-BSD FRML MDRD: 99 ML/MIN/1.73SQ M
GLUCOSE P FAST SERPL-MCNC: 91 MG/DL (ref 65–99)
HCT VFR BLD AUTO: 39.2 % (ref 34.8–46.1)
HDLC SERPL-MCNC: 84 MG/DL
HGB BLD-MCNC: 12.6 G/DL (ref 11.5–15.4)
IMM GRANULOCYTES # BLD AUTO: 0.03 THOUSAND/UL (ref 0–0.2)
IMM GRANULOCYTES NFR BLD AUTO: 1 % (ref 0–2)
LDLC SERPL CALC-MCNC: 95 MG/DL (ref 0–100)
LYMPHOCYTES # BLD AUTO: 2.84 THOUSANDS/ΜL (ref 0.6–4.47)
LYMPHOCYTES NFR BLD AUTO: 43 % (ref 14–44)
MCH RBC QN AUTO: 28.9 PG (ref 26.8–34.3)
MCHC RBC AUTO-ENTMCNC: 32.1 G/DL (ref 31.4–37.4)
MCV RBC AUTO: 90 FL (ref 82–98)
MONOCYTES # BLD AUTO: 0.43 THOUSAND/ΜL (ref 0.17–1.22)
MONOCYTES NFR BLD AUTO: 7 % (ref 4–12)
NEUTROPHILS # BLD AUTO: 3.09 THOUSANDS/ΜL (ref 1.85–7.62)
NEUTS SEG NFR BLD AUTO: 45 % (ref 43–75)
NONHDLC SERPL-MCNC: 119 MG/DL
NRBC BLD AUTO-RTO: 0 /100 WBCS
PLATELET # BLD AUTO: 335 THOUSANDS/UL (ref 149–390)
PMV BLD AUTO: 9.5 FL (ref 8.9–12.7)
POTASSIUM SERPL-SCNC: 3.9 MMOL/L (ref 3.5–5.3)
PROT SERPL-MCNC: 6.7 G/DL (ref 6.4–8.4)
RBC # BLD AUTO: 4.36 MILLION/UL (ref 3.81–5.12)
SODIUM SERPL-SCNC: 136 MMOL/L (ref 135–147)
TRIGL SERPL-MCNC: 119 MG/DL (ref ?–150)
TSH SERPL DL<=0.05 MIU/L-ACNC: 0.92 UIU/ML (ref 0.45–4.5)
WBC # BLD AUTO: 6.61 THOUSAND/UL (ref 4.31–10.16)

## 2025-01-03 PROCEDURE — 82088 ASSAY OF ALDOSTERONE: CPT

## 2025-01-03 PROCEDURE — 84443 ASSAY THYROID STIM HORMONE: CPT

## 2025-01-03 PROCEDURE — 82533 TOTAL CORTISOL: CPT

## 2025-01-03 PROCEDURE — 80061 LIPID PANEL: CPT

## 2025-01-03 PROCEDURE — 80053 COMPREHEN METABOLIC PANEL: CPT

## 2025-01-03 PROCEDURE — 84244 ASSAY OF RENIN: CPT

## 2025-01-03 PROCEDURE — 85025 COMPLETE CBC W/AUTO DIFF WBC: CPT

## 2025-01-03 PROCEDURE — 36415 COLL VENOUS BLD VENIPUNCTURE: CPT

## 2025-01-05 ENCOUNTER — HOSPITAL ENCOUNTER (OUTPATIENT)
Dept: MRI IMAGING | Facility: HOSPITAL | Age: 45
Discharge: HOME | End: 2025-01-05
Attending: FAMILY MEDICINE
Payer: COMMERCIAL

## 2025-01-05 DIAGNOSIS — E27.9 ADRENAL NODULE (HCC): ICD-10-CM

## 2025-01-05 PROCEDURE — 74181 MRI ABDOMEN W/O CONTRAST: CPT

## 2025-01-06 RX ORDER — ESTRADIOL 0.1 MG/G
CREAM VAGINAL
Qty: 42.5 G | Refills: 1 | Status: SHIPPED | OUTPATIENT
Start: 2025-01-06

## 2025-01-09 ENCOUNTER — RESULTS FOLLOW-UP (OUTPATIENT)
Age: 45
End: 2025-01-09

## 2025-01-09 DIAGNOSIS — R79.89 HIGH ALDOSTERONE LEVEL: Primary | ICD-10-CM

## 2025-01-10 LAB
ALDOST SERPL-MCNC: 10 NG/DL (ref 0–30)
ALDOST/RENIN PLAS-RTO: >59.9 {RATIO} (ref 0–30)
RENIN PLAS-CCNC: <0.167 NG/ML/HR (ref 0.17–5.38)

## 2025-01-23 ENCOUNTER — HOSPITAL ENCOUNTER (OUTPATIENT)
Dept: MAMMOGRAPHY | Facility: MEDICAL CENTER | Age: 45
Discharge: HOME | End: 2025-01-23
Payer: COMMERCIAL

## 2025-01-23 ENCOUNTER — APPOINTMENT (OUTPATIENT)
Dept: LAB | Facility: MEDICAL CENTER | Age: 45
End: 2025-01-23
Payer: COMMERCIAL

## 2025-01-23 DIAGNOSIS — N93.9 ABNORMAL UTERINE BLEEDING (AUB): ICD-10-CM

## 2025-01-23 DIAGNOSIS — Z12.31 BREAST CANCER SCREENING BY MAMMOGRAM: ICD-10-CM

## 2025-01-23 LAB
ERYTHROCYTE [DISTWIDTH] IN BLOOD BY AUTOMATED COUNT: 12.3 % (ref 11.6–15.1)
ESTRADIOL SERPL-MCNC: 101.7 PG/ML
FERRITIN SERPL-MCNC: 14 NG/ML (ref 11–307)
FSH SERPL-ACNC: 18.9 MIU/ML
HCT VFR BLD AUTO: 39 % (ref 34.8–46.1)
HGB BLD-MCNC: 12.6 G/DL (ref 11.5–15.4)
MCH RBC QN AUTO: 28.8 PG (ref 26.8–34.3)
MCHC RBC AUTO-ENTMCNC: 32.3 G/DL (ref 31.4–37.4)
MCV RBC AUTO: 89 FL (ref 82–98)
PLATELET # BLD AUTO: 331 THOUSANDS/UL (ref 149–390)
PMV BLD AUTO: 9.7 FL (ref 8.9–12.7)
RBC # BLD AUTO: 4.37 MILLION/UL (ref 3.81–5.12)
WBC # BLD AUTO: 6.39 THOUSAND/UL (ref 4.31–10.16)

## 2025-01-23 PROCEDURE — 83001 ASSAY OF GONADOTROPIN (FSH): CPT

## 2025-01-23 PROCEDURE — 77067 SCR MAMMO BI INCL CAD: CPT

## 2025-01-23 PROCEDURE — 77063 BREAST TOMOSYNTHESIS BI: CPT

## 2025-01-23 PROCEDURE — 82670 ASSAY OF TOTAL ESTRADIOL: CPT

## 2025-01-23 PROCEDURE — 85027 COMPLETE CBC AUTOMATED: CPT

## 2025-01-23 PROCEDURE — 82728 ASSAY OF FERRITIN: CPT

## 2025-02-13 ENCOUNTER — OFFICE VISIT (OUTPATIENT)
Dept: GASTROENTEROLOGY | Facility: MEDICAL CENTER | Age: 45
End: 2025-02-13
Payer: COMMERCIAL

## 2025-02-13 ENCOUNTER — TELEPHONE (OUTPATIENT)
Dept: GASTROENTEROLOGY | Facility: MEDICAL CENTER | Age: 45
End: 2025-02-13

## 2025-02-13 VITALS
HEIGHT: 65 IN | HEART RATE: 83 BPM | DIASTOLIC BLOOD PRESSURE: 72 MMHG | TEMPERATURE: 97.7 F | WEIGHT: 163.6 LBS | BODY MASS INDEX: 27.26 KG/M2 | SYSTOLIC BLOOD PRESSURE: 114 MMHG | OXYGEN SATURATION: 99 %

## 2025-02-13 DIAGNOSIS — K50.80 CROHN'S DISEASE OF BOTH SMALL AND LARGE INTESTINE WITHOUT COMPLICATION (HCC): Primary | ICD-10-CM

## 2025-02-13 DIAGNOSIS — K64.9 HEMORRHOIDS, UNSPECIFIED HEMORRHOID TYPE: ICD-10-CM

## 2025-02-13 PROCEDURE — 99214 OFFICE O/P EST MOD 30 MIN: CPT | Performed by: INTERNAL MEDICINE

## 2025-02-13 RX ORDER — SODIUM CHLORIDE, SODIUM LACTATE, POTASSIUM CHLORIDE, CALCIUM CHLORIDE 600; 310; 30; 20 MG/100ML; MG/100ML; MG/100ML; MG/100ML
125 INJECTION, SOLUTION INTRAVENOUS CONTINUOUS
OUTPATIENT
Start: 2025-02-13

## 2025-02-13 NOTE — ASSESSMENT & PLAN NOTE
She has a history of ileocolonic Crohn's disease diagnosed at age 16 and is not currently on treatment.  Her colonoscopy in 2020 showed mild proctitis with biopsy showing mild chronic active colitis and ileal findings on colonoscopy were notable for an area of ulcerated mucosa in the terminal ileum and biopsies were unremarkable.  She was treated with a course of Canasa and budesonide at that time with resolution of her symptoms and endoscopic and pathologic remission seen on 2022 colonoscopy off maintenance treatment  Her most recent colonoscopy showed 1 focal aphthous ulcer in the terminal ileum, pathology notable for mild to moderate active ileitis and colonic biopsies were normal.  CT enterography showed no significant small bowel inflammation.  I discussed options of starting a course of budesonide given her mild ileal inflammation seen on June 2024 colonoscopy versus continue to monitor her Crohn's disease off medications and scheduling surveillance colonoscopy in June 2025.  We will plan to repeat colonoscopy with terminal ileal intubation in June 2025 and if she has evidence of ongoing disease or worsening inflammation she may benefit from a trial of budesonide.  She does not require maintenance treatment at this time however, we discussed that she may require initiation of maintenance treatment for Crohn's disease if she continues to show evidence of recurrent inflammation requiring oral steroids.    - Obtain laboratory work prior to the follow-up visit including B12, vitamin D and iron studies  - Your next colonoscopy is due June 2025    Health maintenance:  - Yearly flu shot. Avoid live vaccines  - Pneumovax and Prevnar 13 every 5 years  - Routine pap smear with GYN  - Yearly skin exam     Colonoscopy

## 2025-02-13 NOTE — PROGRESS NOTES
Name: Ana Stroud      : 1980      MRN: [id number]  Encounter Provider: Diana M Jaiyeola, MD  Encounter Date: 2025   Encounter department: [facility] [place]  :  Assessment & Plan  Crohn's disease of both small and large intestine without complication (HCC)  She has a history of ileocolonic Crohn's disease diagnosed at age 16 and is not currently on treatment.  Her colonoscopy in  showed mild proctitis with biopsy showing mild chronic active colitis and ileal findings on colonoscopy were notable for an area of ulcerated mucosa in the terminal ileum and biopsies were unremarkable.  She was treated with a course of Canasa and budesonide at that time with resolution of her symptoms and endoscopic and pathologic remission seen on  colonoscopy off maintenance treatment  Her most recent colonoscopy showed 1 focal aphthous ulcer in the terminal ileum, pathology notable for mild to moderate active ileitis and colonic biopsies were normal.  CT enterography showed no significant small bowel inflammation.  I discussed options of starting a course of budesonide given her mild ileal inflammation seen on 2024 colonoscopy versus continue to monitor her Crohn's disease off medications and scheduling surveillance colonoscopy in 2025.  We will plan to repeat colonoscopy with terminal ileal intubation in 2025 and if she has evidence of ongoing disease or worsening inflammation she may benefit from a trial of budesonide.  She does not require maintenance treatment at this time however, we discussed that she may require initiation of maintenance treatment for Crohn's disease if she continues to show evidence of recurrent inflammation requiring oral steroids.    - Obtain laboratory work prior to the follow-up visit including B12, vitamin D and iron studies  - Your next colonoscopy is due 2025    Health maintenance:  - Yearly flu shot. Avoid live vaccines  -  Pneumovax and Prevnar 13 every 5 years  - Routine pap smear with GYN  - Yearly skin exam     Colonoscopy         Hemorrhoids, unspecified hemorrhoid type  She previously has significant irritation and bleeding from her hemorrhoids which is significantly improved with the use of oral fiber supplementation and increasing dietary fiber           History of Present Illness   HPI  Ana Stroud is a 44 y.o. female who presents for follow-up evaluation.  She has a history of ileocolonic Crohn's disease diagnosed at age 16, currently not on treatment.    She was last seen in the GI office in September 2023.  She underwent colonoscopy in June 2024 showing mild ulcerated mucosa in the terminal ileum, scarred mucosa in the rectum, internal and external hemorrhoids otherwise normal exam.  Small bowel biopsy showed mild to moderate active ileitis, colonic biopsies were normal.    Interval history: She underwent CT enterography December 2024 showing no signs of bowel inflammation, 11 mm adrenal nodule.  She is scheduled for a consultation with endocrinology in March.    With initiation of psyllium and oatmeal she has had resolution of her left upper quadrant abdominal pain and is having 1 formed bowel movement per day.  She will have episodes after eating of urgency associated with a loose stool.  She denies rectal bleeding.  Her symptoms from her hemorrhoids have also resolved with fiber supplementation.  She notes joint discomfort      She is continue to follow-up with GYN for abnormal uterine bleeding and is scheduled for a follow-up in March.    1/2025 hemoglobin 12.6, platelets 335, liver enzymes are within normal limits, ferritin 14    MRI of the abdomen without contrast showed stable 11 mm adrenal nodule consistent with adenoma.    IBD history:  She was diagnosed with Crohn's disease at age 16 after presenting with abdominal pain and bloody bowel movements.  She had previously had GI issues several years prior to  this.  She reports undergoing a colonoscopy and was diagnosed with inflammatory bowel disease of the small large intestine.  She was initially on high-dose steroids for several years and TPN for bowel rest.  She was ultimately transition to 5 ASA agents including Pentasa and Asacol.  However these were discontinued due to nausea.  She was ultimately placed on Imuran, the dose of which she cannot recall.  She remain on Imuran to her age 20s up until the time that she was trying to get pregnant and discontinue the medication.  She resumed the medication after her pregnancy and at age 27 was diagnosed with melanoma.  Afterwards the Imuran was discontinued and she has been off all medications since.  She does note being diagnosed with avascular necrosis given her long use of steroids early on in her disease course.  She has no history of fistula or perianal disease     Prior treatments  5ASA: Pentasa, Asacol initially at diagnosis. Stopped due to nausea and GI upset  Steroids: yes. None for >10 years  Imuran: yes, on for 6-8 years. Stopped due to history of melanoma  Biologics: none prior      Extra intestinal manifestations:  At the time of her diagnosis she presented also with erythema nodosum.  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  For this she received high-dose of oral prednisone 80 mg daily.  It has been 10 years since her last steroid use..  She does note intermittent joint discomfort particularly her ankles     February 2021 CT enterography showed subtle changes of chronic ileal inflammatory bowel disease with mild fibrofatty proliferation in the terminal ileum and scar versus small chronic ileal ileal fistula otherwise normal small bowel  March 2021 fecal calprotectin normal        Review of Systems   Constitutional:  Negative for chills and fever.   HENT:  Negative for ear pain and sore throat.    Eyes:   "Negative for pain and visual disturbance.   Respiratory:  Negative for cough and shortness of breath.    Cardiovascular:  Negative for chest pain and palpitations.   Gastrointestinal:  Positive for diarrhea. Negative for abdominal pain and vomiting.   Genitourinary:  Negative for dysuria and hematuria.   Musculoskeletal:  Positive for arthralgias. Negative for back pain.   Skin:  Negative for color change and rash.   Neurological:  Negative for seizures and syncope.   All other systems reviewed and are negative.         Objective   /72 (BP Location: Left arm, Patient Position: Sitting, Cuff Size: Standard)   Pulse 83   Temp 97.7 °F (36.5 °C) (Tympanic)   Ht 5' 5\" (1.651 m)   Wt 74.2 kg (163 lb 9.6 oz)   LMP 12/31/2024   SpO2 99%   BMI 27.22 kg/m²      Physical Exam  Vitals and nursing note reviewed.   Constitutional:       General: She is not in acute distress.     Appearance: She is well-developed.   HENT:      Head: Normocephalic and atraumatic.   Eyes:      Conjunctiva/sclera: Conjunctivae normal.   Cardiovascular:      Rate and Rhythm: Normal rate and regular rhythm.      Heart sounds: No murmur heard.  Pulmonary:      Effort: Pulmonary effort is normal. No respiratory distress.      Breath sounds: Normal breath sounds.   Abdominal:      Palpations: Abdomen is soft.      Tenderness: There is no abdominal tenderness.   Musculoskeletal:         General: No swelling.      Cervical back: Neck supple.   Skin:     General: Skin is warm and dry.      Capillary Refill: Capillary refill takes less than 2 seconds.   Neurological:      Mental Status: She is alert.   Psychiatric:         Mood and Affect: Mood normal.           "

## 2025-02-13 NOTE — TELEPHONE ENCOUNTER
Procedure: Colonoscopy  Date: 6/20/25  Physician performing: Dr. Jaiyeola  Location of procedure:  [facility]  Instructions given to patient: Miralax  Diabetic: No  Clearances: N/A

## 2025-02-23 ENCOUNTER — TELEMEDICINE (OUTPATIENT)
Dept: OTHER | Facility: HOSPITAL | Age: 45
End: 2025-02-23
Payer: COMMERCIAL

## 2025-02-23 DIAGNOSIS — M54.50 ACUTE LEFT-SIDED LOW BACK PAIN WITHOUT SCIATICA: Primary | ICD-10-CM

## 2025-02-23 PROCEDURE — 99213 OFFICE O/P EST LOW 20 MIN: CPT | Performed by: NURSE PRACTITIONER

## 2025-02-23 RX ORDER — METHOCARBAMOL 500 MG/1
500 TABLET, FILM COATED ORAL 3 TIMES DAILY PRN
Qty: 15 TABLET | Refills: 0 | Status: SHIPPED | OUTPATIENT
Start: 2025-02-23 | End: 2025-02-25

## 2025-02-23 NOTE — PROGRESS NOTES
Virtual Regular Visit  Name: Ana Stroud      : 1980      MRN: [id number]  Encounter Provider: CHRISTEL Duran  Encounter Date: 2025   Encounter department: VIRTUAL CARE       Verification of patient location:  Patient is located at Home in the following state in which I hold an active license [place] :  Assessment & Plan  Acute left-sided low back pain without sciatica    Orders:    methocarbamol (ROBAXIN) 500 mg tablet; Take 1 tablet (500 mg total) by mouth 3 (three) times a day as needed for muscle spasms        Encounter provider CHRISTEL Duran    The patient was identified by name and date of birth. Ana Stroud was informed that this is a telemedicine visit and that the visit is being conducted through the Epic Embedded platform. She agrees to proceed..  My office door was closed. No one else was in the room.  She acknowledged consent and understanding of privacy and security of the video platform. The patient has agreed to participate and understands they can discontinue the visit at any time.    Patient is aware this is a billable service.     History obtained from: patient  History of Present Illness     Back Pain  This is a new problem. The current episode started today. The problem occurs constantly. The problem is unchanged. The pain is present in the sacro-iliac. The quality of the pain is described as aching. The pain radiates to the left thigh. The pain is moderate. The pain is The same all the time. The symptoms are aggravated by bending (started while making her bed). Stiffness is present All day. Pertinent negatives include no abdominal pain, bladder incontinence, bowel incontinence, chest pain, dysuria, fever, headaches, leg pain, numbness, paresthesias, tingling or weakness. Risk factors: hx of arthritis in hip. She has tried nothing for the symptoms. The treatment provided no relief.     Review of Systems   Constitutional:  Negative for chills,  diaphoresis, fatigue and fever.   Respiratory:  Negative for cough, chest tightness and shortness of breath.    Cardiovascular:  Negative for chest pain.   Gastrointestinal:  Negative for abdominal pain, blood in stool, bowel incontinence, diarrhea, nausea and vomiting.   Genitourinary:  Negative for bladder incontinence, decreased urine volume, difficulty urinating, dysuria, flank pain, frequency, hematuria and urgency.   Musculoskeletal:  Positive for back pain. Negative for arthralgias, gait problem, joint swelling, myalgias, neck pain and neck stiffness.   Skin:  Negative for rash.   Neurological:  Negative for dizziness, tingling, syncope, weakness, light-headedness, numbness, headaches and paresthesias.       Objective   There were no vitals taken for this visit.    Physical Exam  Constitutional:       General: She is not in acute distress.     Appearance: She is well-developed. She is not diaphoretic.   Pulmonary:      Effort: Pulmonary effort is normal.   Abdominal:      Tenderness: There is no abdominal tenderness.   Musculoskeletal:      Cervical back: Normal. No bony tenderness.      Thoracic back: Normal. No bony tenderness.      Lumbar back: Normal. No bony tenderness.   Skin:     Coloration: Skin is not pale.   Neurological:      Mental Status: She is alert and oriented to person, place, and time.         Visit Time  Total Visit Duration: 15 minutes not including the time spent for establishing the audio/video connection.

## 2025-02-23 NOTE — PATIENT INSTRUCTIONS
Take medication as directed. Do not take medication before driving or with alcohol. It can make you drowsy. Apply heat. If pain persists, or if you develop any new or worsening symptoms proceed to ER, follow up with pcp in 3-5 days

## 2025-02-25 ENCOUNTER — OFFICE VISIT (OUTPATIENT)
Age: 45
End: 2025-02-25
Payer: COMMERCIAL

## 2025-02-25 VITALS
OXYGEN SATURATION: 99 % | HEIGHT: 65 IN | HEART RATE: 88 BPM | TEMPERATURE: 98.7 F | SYSTOLIC BLOOD PRESSURE: 122 MMHG | DIASTOLIC BLOOD PRESSURE: 80 MMHG | BODY MASS INDEX: 27.32 KG/M2 | WEIGHT: 164 LBS

## 2025-02-25 DIAGNOSIS — M53.3 CHRONIC LEFT SI JOINT PAIN: Primary | ICD-10-CM

## 2025-02-25 DIAGNOSIS — G89.29 CHRONIC LEFT SI JOINT PAIN: Primary | ICD-10-CM

## 2025-02-25 PROCEDURE — 99213 OFFICE O/P EST LOW 20 MIN: CPT | Performed by: FAMILY MEDICINE

## 2025-02-25 RX ORDER — NORGESTIMATE AND ETHINYL ESTRADIOL 0.25-0.035
KIT ORAL
COMMUNITY

## 2025-02-25 NOTE — PROGRESS NOTES
Assessment/Plan:       Diagnoses and all orders for this visit:    Chronic left SI joint pain  Comments:  Referral to orthopedics.  Aleve or Motrin as directed.  Patient may use ice.  Orders:  -     Ambulatory Referral to Orthopedic Surgery; Future    Other orders  -     norgestimate-ethinyl estradiol (Sprintec 28) 0.25-35 MG-MCG per tablet; Take by mouth (Patient not taking: Reported on 2/25/2025)  -     Cholecalciferol 50 MCG (2000 UT) TABS; Take by mouth  -     Calcium 250 MG CAPS; Take by mouth            Subjective:        Patient ID: Ana Stroud is a 44 y.o. female.      Back Pain  This is a new problem. The current episode started yesterday. The problem occurs constantly. The problem is unchanged. The pain is present in the sacro-iliac. The quality of the pain is described as aching and cramping. The pain does not radiate. The pain is at a severity of 8/10. The pain is The same all the time. The symptoms are aggravated by bending, coughing, position, sitting, standing and twisting. Stiffness is present All day. Pertinent negatives include no abdominal pain, bladder incontinence, bowel incontinence, chest pain, dysuria, fever, headaches, leg pain, numbness, paresis, paresthesias, pelvic pain, perianal numbness, tingling, weakness or weight loss. Risk factors include history of cancer and history of steroid use.         The following portions of the patient's history were reviewed and updated as appropriate: allergies, current medications, past family history, past medical history, past social history, past surgical history and problem list.      Review of Systems   Constitutional:  Negative for fever and weight loss.   Cardiovascular:  Negative for chest pain.   Gastrointestinal:  Negative for abdominal pain and bowel incontinence.   Genitourinary:  Negative for bladder incontinence, dysuria and pelvic pain.   Musculoskeletal:  Positive for arthralgias and back pain.   Neurological:  Negative for  "tingling, weakness, numbness, headaches and paresthesias.           Objective:        Depression Screening and Follow-up Plan: Patient was screened for depression during today's encounter. They screened negative with a PHQ-2 score of 0.              /80 (BP Location: Right arm, Patient Position: Sitting, Cuff Size: Standard)   Pulse 88   Temp 98.7 °F (37.1 °C) (Temporal)   Ht 5' 5\" (1.651 m)   Wt 74.4 kg (164 lb)   SpO2 99%   BMI 27.29 kg/m²          Physical Exam  Vitals and nursing note reviewed.   Constitutional:       General: She is not in acute distress.     Appearance: Normal appearance. She is not ill-appearing, toxic-appearing or diaphoretic.   Musculoskeletal:         General: Tenderness present.      Comments: Left SI joint pain.  Negative straight leg raise bilaterally.  Gross sensation light touch bilateral lower extremities intact.         "

## 2025-02-26 ENCOUNTER — TELEPHONE (OUTPATIENT)
Age: 45
End: 2025-02-26

## 2025-02-26 ENCOUNTER — APPOINTMENT (OUTPATIENT)
Dept: LAB | Facility: MEDICAL CENTER | Age: 45
End: 2025-02-26

## 2025-02-26 DIAGNOSIS — K50.80 CROHN'S DISEASE OF BOTH SMALL AND LARGE INTESTINE WITHOUT COMPLICATION (HCC): Primary | ICD-10-CM

## 2025-02-26 RX ORDER — BUDESONIDE 3 MG/1
CAPSULE, COATED PELLETS ORAL
Qty: 203 CAPSULE | Refills: 0 | Status: SHIPPED | OUTPATIENT
Start: 2025-02-26 | End: 2025-05-14

## 2025-02-26 NOTE — TELEPHONE ENCOUNTER
Patient states that she is having SI joint pain.  States that she saw her PCP yesterday for same reason. States that  the last time she had this pain it was the precursor to a flare.  Not currently having any flare symptoms. States that she typically does not get a lot of GI symptoms when she does flare and it's usually joint pain.  States that she does not take any maintenance mediations for her Crohn's diease.  Was treated with Budesonide with last flare.  Please review and advise.

## 2025-02-27 ENCOUNTER — APPOINTMENT (OUTPATIENT)
Dept: LAB | Facility: MEDICAL CENTER | Age: 45
End: 2025-02-27
Payer: COMMERCIAL

## 2025-02-27 DIAGNOSIS — K50.80 CROHN'S DISEASE OF BOTH SMALL AND LARGE INTESTINE WITHOUT COMPLICATION (HCC): ICD-10-CM

## 2025-02-27 PROCEDURE — 83993 ASSAY FOR CALPROTECTIN FECAL: CPT

## 2025-02-28 ENCOUNTER — RESULTS FOLLOW-UP (OUTPATIENT)
Dept: GASTROENTEROLOGY | Facility: MEDICAL CENTER | Age: 45
End: 2025-02-28

## 2025-02-28 LAB — CALPROTECTIN STL-MCNC: 30.5 ÂΜG/G

## 2025-03-04 NOTE — PROGRESS NOTES
Name: Ana Stroud      : 1980      MRN: [id number]  Encounter Provider: Niels Branch DO  Encounter Date: 3/5/2025   Encounter department: [facility] [place]      Assessment & Plan  Adrenal nodule (HCC)  Patient with small 1.1 cm benign-appearing adrenal nodule, stable on imaging from  when retroactively compared  Discussed that even benign nodules can have hormonal activity however this would be less likely at her nodule size  PCP did initiate workup  Aldosterone was normal, plasma renin activity was low  No current obvious etiology of the low renin.  Patient never with hypertension, hypokalemia, low suspicion for a primary hyperaldosteronism  Patient did recently initiate on 3-month steroid taper for her Crohn's  This impacts adrenal testing    When off of steroids for at least 1 month, will repeat aldosterone and plasma renin activity, and obtain salivary cortisol x 2  Discussed relatively low utility of repeat imaging, but can re-assess after testing results  6m fu    Would   Orders:    Ambulatory Referral to Endocrinology    Aldosterone; Future    Renin Activity, Plasma; Future    SALIVARY CORTISOL, TWO SPECIMENS; Future    Iron deficiency  Ferritin 14  W heavy periods  Advised PCP fu/consideration of adding more iron supplementation          6m fu    History of Present Illness     Ana Stroud is a 44 y.o. female with past medical history significant for Crohn's disease, adrenal nodule, melanoma, presenting consult for incidental adrenal nodule. PCP initiated workup.      1/3/2025 7 AM aldosterone 10, cortisol 12.5, plasma renin activity <0.167    Crohns dx a child, (dx w erythema nodosum) and has SI joint inflammation, bad rxn to biologics, managed mostly off meds. Recent colonoscopy told active in SI.   Patient is now on a prolonged budesonide pill taper , started 25 by GI. Did also take 2021.   Has been on long term  steroids as a child for crohns and an eye condition, which caused avascular necrosis complications , was taking about 80mg prednisone daily   Never hypokalemia   Never had high blood pressure, tends to run low 110s  Would have vasovagal symptoms late teens early 20s   Been off OCP since November , stopped due to heavy bleeding (has endometriosis and adenomyosits) is on estrogen cream only  Has one ovary and no tubes left    Has had subclinical hyperthyroidism on and off over years, and enlarged thyroid  2019 TPO thyroglobulin ab neg   Patient was pregnant w triplets in early 20s, got PTU , was seeing Dr. Osorio for a while after.  Does not recall being diagnosed with Graves', was told her thyroid dysfunction to be transient.  Has small thyroid nodules followed by PCP    11 mm left adrenal nodule first formally noted on December 2024, read as stable from prior imaging which would have been 2021    No family hx adrenal issues , pituitary issues, parathyroid isses  Dad with hypothyroid, Mathieu  Daughter w EDS, Renauds, vitiligo  Trans son being worked up for EDS  Son w Mathieu     Pertinent Medical History           Review of Systems   Constitutional:  Negative for unexpected weight change (difficult time losing, stable).        Individual chin chairs  In past 5y, hair shed   Cardiovascular:  Positive for palpitations (random).   Genitourinary:         +had bleeding this fall, not sure if period or bleed,has bene getting regularly since  Fairly heavy, but not unusual for her   Neurological:  Negative for syncope (nor presyncope recentlt).   Hematological:  Does not bruise/bleed easily.        Old striae    as per HPI  Pertinent Medical History           Current Outpatient Medications on File Prior to Visit   Medication Sig Dispense Refill    budesonide (ENTOCORT EC) 3 MG capsule Take 3 capsules (9 mg total) by mouth daily for 56 days, THEN 2 capsules (6 mg total) daily for 14 days, THEN 1 capsule (3 mg total)  "daily for 7 days. 203 capsule 0    Calcium Carbonate-Vitamin D (CALCIUM PLUS VITAMIN D PO) Take by mouth daily       Cholecalciferol 50 MCG (2000 UT) TABS Take by mouth (Patient taking differently: Take by mouth Vitamin D3)      estradiol (ESTRACE VAGINAL) 0.1 mg/g vaginal cream Apply 1g into vagina nightly for 1 week, then apply every 3rd night 42.5 g 1    Multiple Vitamins-Minerals (WOMENS MULTIVITAMIN) TABS Take 1 tablet by mouth daily       tretinoin (RETIN-A) 0.025 % cream APPLY EVERY NIGHT AT BEDTIME TO FACE AS TOLERATED WITH MOISTURIZER      norgestimate-ethinyl estradiol (Sprintec 28) 0.25-35 MG-MCG per tablet Take by mouth (Patient not taking: Reported on 3/5/2025)       No current facility-administered medications on file prior to visit.      Social History     Tobacco Use    Smoking status: Never     Passive exposure: Never    Smokeless tobacco: Never   Vaping Use    Vaping status: Never Used   Substance and Sexual Activity    Alcohol use: Yes     Alcohol/week: 1.0 standard drink of alcohol     Types: 1 Glasses of wine per week     Comment: Social use    Drug use: Never    Sexual activity: Yes     Partners: Male     Birth control/protection: Female Sterilization        Medical History Reviewed by provider this encounter:     .    Objective   /78 (BP Location: Right arm, Patient Position: Sitting, Cuff Size: Adult)   Ht 5' 5\" (1.651 m)   Wt 73.9 kg (163 lb)   BMI 27.12 kg/m²      Body mass index is 27.12 kg/m².  Wt Readings from Last 3 Encounters:   03/05/25 73.9 kg (163 lb)   02/25/25 74.4 kg (164 lb)   02/13/25 74.2 kg (163 lb 9.6 oz)     Physical Exam  Vitals reviewed.   Constitutional:       General: She is not in acute distress.     Appearance: She is well-developed.   HENT:      Head: Normocephalic and atraumatic.   Eyes:      Conjunctiva/sclera: Conjunctivae normal.   Neck:      Thyroid: Thyromegaly present. No thyroid mass or thyroid tenderness.   Cardiovascular:      Rate and Rhythm: " Normal rate and regular rhythm.      Heart sounds: No murmur heard.  Pulmonary:      Effort: Pulmonary effort is normal. No respiratory distress.      Breath sounds: Normal breath sounds.   Abdominal:      Palpations: Abdomen is soft.      Tenderness: There is no abdominal tenderness.   Musculoskeletal:         General: No swelling.      Cervical back: Neck supple.   Skin:     General: Skin is warm and dry.      Capillary Refill: Capillary refill takes less than 2 seconds.      Comments: Old stretch marks abdomen    Neurological:      Mental Status: She is alert.   Psychiatric:         Mood and Affect: Mood normal.       Labs:   Lab Results   Component Value Date    HGBA1C 5.5 05/17/2024    HGBA1C 5.2 05/20/2023    HGBA1C 5.2 06/18/2022     Lab Results   Component Value Date    CREATININE 0.73 01/03/2025    CREATININE 0.86 05/20/2023    CREATININE 0.79 02/18/2023    BUN 10 01/03/2025    K 3.9 01/03/2025     01/03/2025    CO2 26 01/03/2025     GFR, Calculated   Date Value Ref Range Status   10/01/2020 104 >60 mL/min/1.73m2 Final     Comment:     mL/min per 1.73 square meters                                            Normal Function or Mild Renal    Disease (if clinically at risk):  >or=60  Moderately Decreased:                30-59  Severely Decreased:                  15-29  Renal Failure:                         <15                                            -American GFR: multiply reported GFR by 1.16    Please note that the eGFR is based on the CKD-EPI calculation, and is not intended to be used for drug dosing.                                            Note: Calculated GFR may not be an accurate indicator of renal function if the patient's renal function is not in a steady state.    Ordering Provider: WALDO PROCTOR  Report Copied to : [facility]     eGFR   Date Value Ref Range Status   01/03/2025 99 ml/min/1.73sq m Final     Lab Results   Component Value Date    HDL 84 01/03/2025    TRIG  119 01/03/2025     Lab Results   Component Value Date    ALT 21 01/03/2025    AST 23 01/03/2025    ALKPHOS 85 01/03/2025     Lab Results   Component Value Date    QPN8FAFIKUHP 0.924 01/03/2025    [id number] 0.454 11/19/2024    [id number] 0.588 02/18/2023     Lab Results   Component Value Date    FREET4 1.03 09/24/2019       Patient Instructions   Will need to wait for steroids to complete and wash out before adrenal labs    Repeat aldosterone/renin at least 1 month after stopping steroids  Do salivary cortisols as well then    Salivary cortisol- sample collection instructions:     - Do not brush your teeth before collecting the sample.  - Do not eat or drink 15 minutes before collecting the sample.  - Do not touch the swab with your fingers.  - Collect the sample between 10 p.m. and midnight.   Samples (two) should be collected on nonconsecutive days      Discussed with the patient and all questioned fully answered. She will call me if any problems arise.

## 2025-03-05 ENCOUNTER — OFFICE VISIT (OUTPATIENT)
Dept: ENDOCRINOLOGY | Facility: CLINIC | Age: 45
End: 2025-03-05
Payer: COMMERCIAL

## 2025-03-05 VITALS
BODY MASS INDEX: 27.16 KG/M2 | SYSTOLIC BLOOD PRESSURE: 120 MMHG | HEIGHT: 65 IN | DIASTOLIC BLOOD PRESSURE: 78 MMHG | WEIGHT: 163 LBS

## 2025-03-05 DIAGNOSIS — E27.9 ADRENAL NODULE (HCC): Primary | ICD-10-CM

## 2025-03-05 DIAGNOSIS — E61.1 IRON DEFICIENCY: ICD-10-CM

## 2025-03-05 PROCEDURE — 99244 OFF/OP CNSLTJ NEW/EST MOD 40: CPT | Performed by: INTERNAL MEDICINE

## 2025-03-05 RX ORDER — TRETINOIN 0.25 MG/G
CREAM TOPICAL
COMMUNITY
Start: 2025-02-24

## 2025-03-05 NOTE — PATIENT INSTRUCTIONS
Will need to wait for steroids to complete and wash out before adrenal labs    Repeat aldosterone/renin at least 1 month after stopping steroids  Do salivary cortisols as well then    Salivary cortisol- sample collection instructions:     - Do not brush your teeth before collecting the sample.  - Do not eat or drink 15 minutes before collecting the sample.  - Do not touch the swab with your fingers.  - Collect the sample between 10 p.m. and midnight.   Samples (two) should be collected on nonconsecutive days

## 2025-03-05 NOTE — ASSESSMENT & PLAN NOTE
Ferritin 14  W heavy periods  Advised PCP fu/consideration of adding more iron supplementation

## 2025-03-05 NOTE — ASSESSMENT & PLAN NOTE
Patient with small 1.1 cm benign-appearing adrenal nodule, stable on imaging from 2021 when retroactively compared  Discussed that even benign nodules can have hormonal activity however this would be less likely at her nodule size  PCP did initiate workup  Aldosterone was normal, plasma renin activity was low  No current obvious etiology of the low renin.  Patient never with hypertension, hypokalemia, low suspicion for a primary hyperaldosteronism  Patient did recently initiate on 3-month steroid taper for her Crohn's  This impacts adrenal testing    When off of steroids for at least 1 month, will repeat aldosterone and plasma renin activity, and obtain salivary cortisol x 2  Discussed relatively low utility of repeat imaging, but can re-assess after testing results  6m fu    Would   Orders:    Ambulatory Referral to Endocrinology    Aldosterone; Future    Renin Activity, Plasma; Future    SALIVARY CORTISOL, TWO SPECIMENS; Future

## 2025-03-17 ENCOUNTER — OFFICE VISIT (OUTPATIENT)
Dept: OBGYN CLINIC | Facility: MEDICAL CENTER | Age: 45
End: 2025-03-17
Payer: COMMERCIAL

## 2025-03-17 VITALS — BODY MASS INDEX: 26.61 KG/M2 | HEIGHT: 65 IN | WEIGHT: 159.7 LBS

## 2025-03-17 DIAGNOSIS — N80.9 ENDOMETRIOSIS: Primary | ICD-10-CM

## 2025-03-17 DIAGNOSIS — N93.9 ABNORMAL UTERINE BLEEDING (AUB): ICD-10-CM

## 2025-03-17 PROCEDURE — 99213 OFFICE O/P EST LOW 20 MIN: CPT | Performed by: OBSTETRICS & GYNECOLOGY

## 2025-03-17 RX ORDER — LEVONORGESTREL AND ETHINYL ESTRADIOL 0.15-0.03
1 KIT ORAL DAILY
Qty: 91 TABLET | Refills: 3 | Status: SHIPPED | OUTPATIENT
Start: 2025-03-17

## 2025-03-17 NOTE — PROGRESS NOTES
Assessment:  44 y.o.  who presents as a follow up of AUB in setting of endometriosis mgmt.    Plan:  Diagnoses and all orders for this visit:    Endometriosis  Abnormal uterine bleeding (AUB)  -     levonorgestrel-ethinyl estradiol (SEASONALE) 0.15-0.03 MG per tablet; Take 1 tablet by mouth daily      __________________________________________________________________    Subjective   Ana Stroud is a 44 y.o.  who presents as a follow up of AUB in setting of mgmt of endometriosis with OCP. At last visit, decided to stop OCP for better lab assessment.    Patient reports had menses 3x since stopping OCP, monthly each time and regular interval. 1st felt normal, minimal cramping overall. Progressively worse as time went on. 3/1 LMP, clots and cramping.  Bleeding in between as well. Light overall. Endo symptoms seem to be slowly returning though and would like to review results, discuss next steps.     Reviewed testing to date, incl comparison of FSH/estradiol on vs off OCP. Notable inc in FSH, however alone limited in definitively diagnosing menopause (and less than expected for same) and with symptom suggesting against this. The elevated level suggests psb perimenopause and her symptom changes may reflect need for alteration to her OCP. Agreeable to change.         The following portions of the patient's history were reviewed and updated as appropriate: allergies, current medications, past medical history, past social history, past surgical history, and problem list.    Review of Systems  Review of Systems   Constitutional:  Positive for fatigue. Negative for chills and fever.   Respiratory:  Negative for shortness of breath.    Cardiovascular:  Negative for chest pain and palpitations.   Gastrointestinal:  Negative for abdominal distention, abdominal pain, constipation, diarrhea, nausea and vomiting.   Endocrine: Positive for heat intolerance.   Genitourinary:  Positive for dyspareunia (dryness),  "menstrual problem, pelvic pain and vaginal bleeding. Negative for dysuria, flank pain, frequency, vaginal discharge and vaginal pain.   Skin:  Negative for rash and wound.   Neurological:  Negative for dizziness, light-headedness and headaches.            Objective  Ht 5' 5\" (1.651 m)   Wt 72.4 kg (159 lb 11.2 oz)   LMP 03/01/2025   BMI 26.58 kg/m²      Physical Exam:  Physical Exam  Constitutional:       General: She is not in acute distress.     Appearance: Normal appearance. She is not ill-appearing, toxic-appearing or diaphoretic.   Eyes:      General: No scleral icterus.        Right eye: No discharge.         Left eye: No discharge.      Conjunctiva/sclera: Conjunctivae normal.   Cardiovascular:      Rate and Rhythm: Normal rate.   Pulmonary:      Effort: Pulmonary effort is normal. No respiratory distress.   Abdominal:      General: There is no distension.      Palpations: There is no mass.      Tenderness: There is no abdominal tenderness. There is no guarding or rebound.      Hernia: No hernia is present.   Musculoskeletal:         General: No swelling.   Skin:     General: Skin is warm and dry.      Coloration: Skin is not jaundiced or pale.      Findings: No bruising or erythema.   Neurological:      Mental Status: She is alert.   Psychiatric:         Mood and Affect: Mood normal.         Behavior: Behavior normal.         Thought Content: Thought content normal.         Judgment: Judgment normal.           Lab Review  FSH, E2, CBC, ferritin (1/2025)    "

## 2025-04-19 NOTE — TELEPHONE ENCOUNTER
Patients GI provider:  Dr Flaco Fortuner     Number to return call: [phone]    Reason for call: Pt calling stated she is due to have a repeat colonoscopy, patient requesting for a call back to schedule     Scheduled procedure/appointment date if applicable: Apt/procedure n/a
Scheduled date of colonoscopy (as of today):5/19/22  Physician performing colonoscopy: Rodríguez Michel  Location of colonoscopy: [place]  Bowel prep reviewed with patient: Miralax/Dulcolax  Instructions reviewed with patient by: Jada/lissett  Clearances: N/A
4 = No assist / stand by assistance

## 2025-06-03 ENCOUNTER — APPOINTMENT (OUTPATIENT)
Dept: LAB | Facility: MEDICAL CENTER | Age: 45
End: 2025-06-03

## 2025-06-03 ENCOUNTER — TELEPHONE (OUTPATIENT)
Dept: GASTROENTEROLOGY | Facility: MEDICAL CENTER | Age: 45
End: 2025-06-03

## 2025-06-03 DIAGNOSIS — Z00.8 HEALTH EXAMINATION IN POPULATION SURVEY: ICD-10-CM

## 2025-06-03 LAB
CHOLEST SERPL-MCNC: 173 MG/DL (ref ?–200)
HDLC SERPL-MCNC: 70 MG/DL
LDLC SERPL CALC-MCNC: 72 MG/DL (ref 0–100)
NONHDLC SERPL-MCNC: 103 MG/DL
TRIGL SERPL-MCNC: 154 MG/DL (ref ?–150)

## 2025-06-03 PROCEDURE — 80061 LIPID PANEL: CPT

## 2025-06-03 PROCEDURE — 83036 HEMOGLOBIN GLYCOSYLATED A1C: CPT

## 2025-06-03 PROCEDURE — 36415 COLL VENOUS BLD VENIPUNCTURE: CPT

## 2025-06-04 LAB
EST. AVERAGE GLUCOSE BLD GHB EST-MCNC: 111 MG/DL
HBA1C MFR BLD: 5.5 %

## 2025-06-06 ENCOUNTER — ANESTHESIA EVENT (OUTPATIENT)
Dept: ANESTHESIOLOGY | Facility: HOSPITAL | Age: 45
End: 2025-06-06

## 2025-06-06 ENCOUNTER — ANESTHESIA (OUTPATIENT)
Dept: ANESTHESIOLOGY | Facility: HOSPITAL | Age: 45
End: 2025-06-06

## 2025-06-09 ENCOUNTER — OFFICE VISIT (OUTPATIENT)
Age: 45
End: 2025-06-09
Payer: COMMERCIAL

## 2025-06-09 VITALS
HEART RATE: 108 BPM | TEMPERATURE: 98.5 F | HEIGHT: 65 IN | OXYGEN SATURATION: 98 % | SYSTOLIC BLOOD PRESSURE: 138 MMHG | BODY MASS INDEX: 27.76 KG/M2 | WEIGHT: 166.6 LBS | DIASTOLIC BLOOD PRESSURE: 82 MMHG

## 2025-06-09 DIAGNOSIS — Z11.4 SCREENING FOR HIV (HUMAN IMMUNODEFICIENCY VIRUS): ICD-10-CM

## 2025-06-09 DIAGNOSIS — Z00.00 ANNUAL PHYSICAL EXAM: ICD-10-CM

## 2025-06-09 DIAGNOSIS — Z11.59 NEED FOR HEPATITIS C SCREENING TEST: ICD-10-CM

## 2025-06-09 DIAGNOSIS — F41.9 ANXIETY DISORDER, UNSPECIFIED TYPE: Primary | ICD-10-CM

## 2025-06-09 PROCEDURE — 99396 PREV VISIT EST AGE 40-64: CPT | Performed by: FAMILY MEDICINE

## 2025-06-09 PROCEDURE — 99213 OFFICE O/P EST LOW 20 MIN: CPT | Performed by: FAMILY MEDICINE

## 2025-06-09 RX ORDER — CLONAZEPAM 1 MG/1
1 TABLET ORAL
Qty: 90 TABLET | Refills: 1 | Status: SHIPPED | OUTPATIENT
Start: 2025-06-09

## 2025-06-09 RX ORDER — CLONAZEPAM 1 MG/1
TABLET ORAL
COMMUNITY
Start: 2025-05-17 | End: 2025-06-09 | Stop reason: SDUPTHER

## 2025-06-09 NOTE — PROGRESS NOTES
Adult Annual Physical  Name: Ana Stroud      : 1980      MRN: [id number]  Encounter Provider: Jaspreet Ball DO  Encounter Date: 2025   Encounter department: [facility]    :  Assessment & Plan  Anxiety disorder, unspecified type  Patient use clonazepam at night.  Patient will continue to work with a psychologist/counselor.  Orders:    clonazePAM (KlonoPIN) 1 mg tablet; Take 1 tablet (1 mg total) by mouth daily at bedtime    Annual physical exam  Completed at this time.  Patient want laboratory studies done.       Screening for HIV (human immunodeficiency virus)    Orders:    HIV 1/2 AB/AG w Reflex [facility] for 2 yr old and above; Future    Need for hepatitis C screening test    Orders:    Hepatitis C antibody; Future        Preventive Screenings:  - Diabetes Screening: screening up-to-date and risks/benefits discussed  - Cholesterol Screening: risks/benefits discussed and screening up-to-date   - Hepatitis C screening: risks/benefits discussed, patient agrees to screening and orders placed   - HIV screening: risks/benefits discussed, patient agrees to screening and orders placed   - Cervical cancer screening: screening up-to-date and risks/benefits discussed   - Breast cancer screening: screening up-to-date and risks/benefits discussed   - Colon cancer screening: screening up-to-date   - Lung cancer screening: screening not indicated     Immunizations:    - Risks/benefits immunizations discussed      Counseling/Anticipatory Guidance:  - Alcohol: discussed moderation in alcohol intake and recommendations for healthy alcohol use.   - Drug use: discussed harms of illicit drug use and how it can negatively impact mental/physical health.   - Tobacco use: discussed harms of tobacco use and management options for quitting.   - Dental health: discussed importance of regular tooth brushing, flossing, and dental visits.   - Diet: discussed recommendations for a healthy/well-balanced  diet.   - Exercise: the importance of regular exercise/physical activity was discussed. Recommend exercise 3-5 times per week for at least 30 minutes.   - Injury prevention: discussed safety/seat belts, safety helmets, smoke detectors, carbon monoxide detectors, and smoking near bedding or upholstery.       Depression Screening and Follow-up Plan: Patient was screened for depression during today's encounter. They screened negative with a PHQ-2 score of 2.          History of Present Illness     Adult Annual Physical:  Patient presents for annual physical. Patient is here for wellness exam.  Patient with panic attacks and increased anxiety.  Patient not depressed..     Diet and Physical Activity:  - Diet/Nutrition: well balanced diet and portion control.  - Exercise: moderate cardiovascular exercise, strength training exercises and 3-4 times a week on average.    Depression Screening:  - PHQ-2 Score: 2    General Health:  - Sleep: sleeps poorly and 4-6 hours of sleep on average.  - Hearing: normal hearing right ear and normal hearing left ear. pulsatile tinnitus still active  - Vision: most recent eye exam > 1 year ago and wears glasses and contacts.  [facility]  - Dental: regular dental visits, brushes teeth twice daily and floss regularly. [facility]    /GYN Health:  - Follows with GYN: yes.   - Menopause: perimenopausal.   - History of STDs: no  - Contraception: oral contraceptives and tubal ligation.      Advanced Care Planning:  - Has an advanced directive?: no    - Has a durable medical POA?: no      Review of Systems   Constitutional: Negative.    HENT: Negative.     Eyes: Negative.    Respiratory: Negative.     Cardiovascular: Negative.    Gastrointestinal: Negative.    Endocrine: Negative.    Genitourinary: Negative.    Musculoskeletal:  Positive for arthralgias.   Skin: Negative.    Allergic/Immunologic: Negative.    Neurological: Negative.    Hematological: Negative.   "  Psychiatric/Behavioral:  The patient is nervous/anxious.          Objective   /82 (BP Location: Right arm, Patient Position: Sitting, Cuff Size: Standard)   Pulse (!) 108   Temp 98.5 °F (36.9 °C) (Temporal)   Ht 5' 5\" (1.651 m)   Wt 75.6 kg (166 lb 9.6 oz)   SpO2 98%   BMI 27.72 kg/m²     Physical Exam  Vitals and nursing note reviewed.   Constitutional:       General: She is not in acute distress.     Appearance: Normal appearance. She is well-developed. She is not ill-appearing, toxic-appearing or diaphoretic.   HENT:      Head: Normocephalic and atraumatic.      Right Ear: Tympanic membrane, ear canal and external ear normal.      Left Ear: Tympanic membrane, ear canal and external ear normal.      Nose: Nose normal.      Mouth/Throat:      Mouth: Mucous membranes are moist.      Pharynx: No oropharyngeal exudate or posterior oropharyngeal erythema.     Eyes:      General:         Right eye: No discharge.         Left eye: No discharge.     Neck:      Thyroid: No thyromegaly.      Vascular: No carotid bruit.      Trachea: No tracheal deviation.     Cardiovascular:      Rate and Rhythm: Normal rate and regular rhythm.      Pulses: Normal pulses.      Heart sounds: Normal heart sounds. No murmur heard.     No gallop.   Pulmonary:      Effort: Pulmonary effort is normal. No respiratory distress.      Breath sounds: Normal breath sounds. No stridor. No wheezing or rales.   Chest:      Chest wall: No tenderness.   Abdominal:      General: Bowel sounds are normal.     Musculoskeletal:         General: Tenderness present. No deformity.      Cervical back: Normal range of motion and neck supple.      Right lower leg: No edema.      Left lower leg: No edema.   Lymphadenopathy:      Cervical: No cervical adenopathy.     Skin:     General: Skin is warm and dry.      Capillary Refill: Capillary refill takes less than 2 seconds.      Coloration: Skin is not pale.      Findings: No erythema or rash. "     Neurological:      Mental Status: She is alert and oriented to person, place, and time. Mental status is at baseline.      Cranial Nerves: No cranial nerve deficit.      Motor: No abnormal muscle tone.      Coordination: Coordination normal.      Deep Tendon Reflexes: Reflexes normal.     Psychiatric:         Behavior: Behavior normal.         Thought Content: Thought content normal.         Judgment: Judgment normal.      Comments: Anxiety

## 2025-06-09 NOTE — ASSESSMENT & PLAN NOTE
Patient use clonazepam at night.  Patient will continue to work with a psychologist/counselor.  Orders:    clonazePAM (KlonoPIN) 1 mg tablet; Take 1 tablet (1 mg total) by mouth daily at bedtime

## 2025-06-09 NOTE — PATIENT INSTRUCTIONS
"Patient Education     Routine physical for adults   The Basics   Written by the doctors and editors at [facility]   What is a physical? -- A physical is a routine visit, or \"check-up,\" with your doctor. You might also hear it called a \"wellness visit\" or \"preventive visit.\"  During each visit, the doctor will:   Ask about your physical and mental health   Ask about your habits, behaviors, and lifestyle   Do an exam   Give you vaccines if needed   Talk to you about any medicines you take   Give advice about your health   Answer your questions  Getting regular check-ups is an important part of taking care of your health. It can help your doctor find and treat any problems you have. But it's also important for preventing health problems.  A routine physical is different from a \"sick visit.\" A sick visit is when you see a doctor because of a health concern or problem. Since physicals are scheduled ahead of time, you can think about what you want to ask the doctor.  How often should I get a physical? -- It depends on your age and health. In general, for people age 21 years and older:   If you are younger than 50 years, you might be able to get a physical every 3 years.   If you are 50 years or older, your doctor might recommend a physical every year.  If you have an ongoing health condition, like diabetes or high blood pressure, your doctor will probably want to see you more often.  What happens during a physical? -- In general, each visit will include:   Physical exam - The doctor or nurse will check your height, weight, heart rate, and blood pressure. They will also look at your eyes and ears. They will ask about how you are feeling and whether you have any symptoms that bother you.   Medicines - It's a good idea to bring a list of all the medicines you take to each doctor visit. Your doctor will talk to you about your medicines and answer any questions. Tell them if you are having any side effects that bother you. You " "should also tell them if you are having trouble paying for any of your medicines.   Habits and behaviors - This includes:   Your diet   Your exercise habits   Whether you smoke, drink alcohol, or use drugs   Whether you are sexually active   Whether you feel safe at home  Your doctor will talk to you about things you can do to improve your health and lower your risk of health problems. They will also offer help and support. For example, if you want to quit smoking, they can give you advice and might prescribe medicines. If you want to improve your diet or get more physical activity, they can help you with this, too.   Lab tests, if needed - The tests you get will depend on your age and situation. For example, your doctor might want to check your:   Cholesterol   Blood sugar   Iron level   Vaccines - The recommended vaccines will depend on your age, health, and what vaccines you already had. Vaccines are very important because they can prevent certain serious or deadly infections.   Discussion of screening - \"Screening\" means checking for diseases or other health problems before they cause symptoms. Your doctor can recommend screening based on your age, risk, and preferences. This might include tests to check for:   Cancer, such as breast, prostate, cervical, ovarian, colorectal, prostate, lung, or skin cancer   Sexually transmitted infections, such as chlamydia and gonorrhea   Mental health conditions like depression and anxiety  Your doctor will talk to you about the different types of screening tests. They can help you decide which screenings to have. They can also explain what the results might mean.   Answering questions - The physical is a good time to ask the doctor or nurse questions about your health. If needed, they can refer you to other doctors or specialists, too.  Adults older than 65 years often need other care, too. As you get older, your doctor will talk to you about:   How to prevent falling at " home   Hearing or vision tests   Memory testing   How to take your medicines safely   Making sure that you have the help and support you need at home  All topics are updated as new evidence becomes available and our peer review process is complete.  This topic retrieved from [employer] on: May 02, 2024.  Topic 891393 Version 1.0  Release: 32.4.3 - C32.122  © 2024 UpToDate, Inc. and/or its affiliates. All rights reserved.  Consumer Information Use and Disclaimer   Disclaimer: This generalized information is a limited summary of diagnosis, treatment, and/or medication information. It is not meant to be comprehensive and should be used as a tool to help the user understand and/or assess potential diagnostic and treatment options. It does NOT include all information about conditions, treatments, medications, side effects, or risks that may apply to a specific patient. It is not intended to be medical advice or a substitute for the medical advice, diagnosis, or treatment of a health care provider based on the health care provider's examination and assessment of a patient's specific and unique circumstances. Patients must speak with a health care provider for complete information about their health, medical questions, and treatment options, including any risks or benefits regarding use of medications. This information does not endorse any treatments or medications as safe, effective, or approved for treating a specific patient. UpToDate, Inc. and its affiliates disclaim any warranty or liability relating to this information or the use thereof.The use of this information is governed by the Terms of Use, available at https://www.woltersWaveRxuwer.com/en/know/clinical-effectiveness-terms. 2024© UpToDate, Inc. and its affiliates and/or licensors. All rights reserved.  Copyright   © 2024 UpToDate, Inc. and/or its affiliates. All rights reserved.

## 2025-06-20 ENCOUNTER — ANESTHESIA EVENT (OUTPATIENT)
Dept: GASTROENTEROLOGY | Facility: MEDICAL CENTER | Age: 45
End: 2025-06-20
Payer: COMMERCIAL

## 2025-06-20 ENCOUNTER — HOSPITAL ENCOUNTER (OUTPATIENT)
Dept: GASTROENTEROLOGY | Facility: MEDICAL CENTER | Age: 45
Setting detail: OUTPATIENT SURGERY
Discharge: HOME | End: 2025-06-20
Payer: COMMERCIAL

## 2025-06-20 ENCOUNTER — ANESTHESIA (OUTPATIENT)
Dept: GASTROENTEROLOGY | Facility: MEDICAL CENTER | Age: 45
End: 2025-06-20
Payer: COMMERCIAL

## 2025-06-20 VITALS
TEMPERATURE: 98 F | SYSTOLIC BLOOD PRESSURE: 122 MMHG | HEART RATE: 80 BPM | BODY MASS INDEX: 27.66 KG/M2 | RESPIRATION RATE: 17 BRPM | OXYGEN SATURATION: 100 % | DIASTOLIC BLOOD PRESSURE: 60 MMHG | HEIGHT: 65 IN | WEIGHT: 166 LBS

## 2025-06-20 DIAGNOSIS — M53.3 SACROILIAC JOINT PAIN: Primary | ICD-10-CM

## 2025-06-20 DIAGNOSIS — K50.80 CROHN'S DISEASE OF BOTH SMALL AND LARGE INTESTINE WITHOUT COMPLICATION (HCC): ICD-10-CM

## 2025-06-20 PROCEDURE — 88305 TISSUE EXAM BY PATHOLOGIST: CPT | Performed by: PATHOLOGY

## 2025-06-20 PROCEDURE — 45385 COLONOSCOPY W/LESION REMOVAL: CPT | Performed by: INTERNAL MEDICINE

## 2025-06-20 PROCEDURE — 45380 COLONOSCOPY AND BIOPSY: CPT | Performed by: INTERNAL MEDICINE

## 2025-06-20 RX ORDER — SODIUM CHLORIDE, SODIUM LACTATE, POTASSIUM CHLORIDE, CALCIUM CHLORIDE 600; 310; 30; 20 MG/100ML; MG/100ML; MG/100ML; MG/100ML
125 INJECTION, SOLUTION INTRAVENOUS CONTINUOUS
Status: DISCONTINUED | OUTPATIENT
Start: 2025-06-20 | End: 2025-06-24 | Stop reason: HOSPADM

## 2025-06-20 RX ORDER — PROPOFOL 10 MG/ML
INJECTION, EMULSION INTRAVENOUS AS NEEDED
Status: DISCONTINUED | OUTPATIENT
Start: 2025-06-20 | End: 2025-06-20

## 2025-06-20 RX ADMIN — PROPOFOL 50 MG: 10 INJECTION, EMULSION INTRAVENOUS at 07:43

## 2025-06-20 RX ADMIN — PROPOFOL 150 MG: 10 INJECTION, EMULSION INTRAVENOUS at 07:32

## 2025-06-20 RX ADMIN — PROPOFOL 50 MG: 10 INJECTION, EMULSION INTRAVENOUS at 07:41

## 2025-06-20 RX ADMIN — PROPOFOL 50 MG: 10 INJECTION, EMULSION INTRAVENOUS at 07:36

## 2025-06-20 RX ADMIN — PROPOFOL 50 MG: 10 INJECTION, EMULSION INTRAVENOUS at 07:34

## 2025-06-20 RX ADMIN — PROPOFOL 50 MG: 10 INJECTION, EMULSION INTRAVENOUS at 07:47

## 2025-06-20 RX ADMIN — PROPOFOL 50 MG: 10 INJECTION, EMULSION INTRAVENOUS at 07:39

## 2025-06-20 RX ADMIN — SODIUM CHLORIDE, SODIUM LACTATE, POTASSIUM CHLORIDE, AND CALCIUM CHLORIDE: .6; .31; .03; .02 INJECTION, SOLUTION INTRAVENOUS at 07:21

## 2025-06-20 RX ADMIN — PROPOFOL 50 MG: 10 INJECTION, EMULSION INTRAVENOUS at 07:37

## 2025-06-20 RX ADMIN — PROPOFOL 50 MG: 10 INJECTION, EMULSION INTRAVENOUS at 07:52

## 2025-06-20 RX ADMIN — PROPOFOL 50 MG: 10 INJECTION, EMULSION INTRAVENOUS at 07:49

## 2025-06-20 NOTE — ANESTHESIA PREPROCEDURE EVALUATION
"Procedure:  COLONOSCOPY    Relevant Problems   ANESTHESIA   (+) PONV (postoperative nausea and vomiting)      CARDIO (within normal limits)   (+) Raynaud's disease without gangrene   (+) Varicose veins      ENDO (within normal limits)      GI/HEPATIC (within normal limits)      /RENAL (within normal limits)      HEMATOLOGY (within normal limits)      NEURO/PSYCH   (+) Anxiety disorder   (+) Chronic left SI joint pain      PULMONARY (within normal limits)      Orthopedic/Musculoskeletal   (+) Avascular bone necrosis (HCC)      Other   (+) Adrenal nodule (HCC)   (+) Autoimmune disease (HCC)   (+) Crohn's disease (HCC)   (+) Malignant melanoma of back (HCC)      EKG 6/21/2022:  Normal sinus rhythm  Normal ECG  When compared with ECG of 17-NOV-2005 11:27,  No significant change was found    Lab Results   Component Value Date    WBC 6.39 01/23/2025    HGB 12.6 01/23/2025    HCT 39.0 01/23/2025    MCV 89 01/23/2025     01/23/2025     Lab Results   Component Value Date    SODIUM 136 01/03/2025    K 3.9 01/03/2025     01/03/2025    CO2 26 01/03/2025    BUN 10 01/03/2025    CREATININE 0.73 01/03/2025    GLUC 93 10/01/2020    CALCIUM 9.1 01/03/2025     No results found for: \"INR\", \"PROTIME\"  Lab Results   Component Value Date    HGBA1C 5.5 06/03/2025          Physical Exam    Airway     Mallampati score: II  TM Distance: >3 FB  Neck ROM: full      Cardiovascular  Cardiovascular exam normal    Dental   No notable dental hx     Pulmonary  Pulmonary exam normal     Neurological  - normal exam    Other Findings  post-pubertal.      Anesthesia Plan  ASA Score- 2     Anesthesia Type- IV sedation with anesthesia with ASA Monitors.         Additional Monitors:     Airway Plan: natural airway.           Plan Factors-Exercise tolerance (METS): >4 METS.    Chart reviewed. EKG reviewed.  Existing labs reviewed. Patient summary reviewed.                  Induction- intravenous.    Postoperative Plan- .   Monitoring Plan - " Monitoring plan - standard ASA monitoring          Informed Consent- Anesthetic plan and risks discussed with patient.  I personally reviewed this patient with the CRNA. Discussed and agreed on the Anesthesia Plan with the CRNA..      NPO Status:  No vitals data found for the desired time range.

## 2025-06-20 NOTE — ANESTHESIA POSTPROCEDURE EVALUATION
Post-Op Assessment Note    CV Status:  Stable  Pain Score: 0    Pain management: adequate       Mental Status:  Sleepy and arousable   Hydration Status:  Stable   PONV Controlled:  None   Airway Patency:  Patent     Post Op Vitals Reviewed: Yes    No anethesia notable event occurred.    Staff: CRNA           Last Filed PACU Vitals:  Vitals Value Taken Time   Temp     Pulse     BP     Resp     SpO2

## 2025-06-20 NOTE — H&P
H&P - Gastroenterology   Name: Ana Stroud 45 y.o. female I MRN: [id number]  Unit/Bed#:  I Date of Admission: 2025   Date of Service: 2025 I Hospital Day: 0     Assessment & Plan   This is a 45 y.o. year old female here for colonoscopy, and she is stable and optimized for her procedure.    History of Present Illness    Ana Stroud is a 45 y.o. year old female who presents for crohn's disease    REVIEW OF SYSTEMS: Per the HPI, and otherwise unremarkable.    Historical Information   Past Medical History[1]  Past Surgical History[2]  Social History[3]  E-Cigarette/Vaping    E-Cigarette Use Never User      E-Cigarette/Vaping Substances    Nicotine No     THC No     CBD No     Flavoring No     Other No     Unknown No      Family history non-contributory    Meds/Allergies   Current Medications[4]  Allergies[5]    Objective :       Physical Exam  Gen: NAD  Head: NCAT  CV: RRR  CHEST: Clear  ABD: soft, NT/ND  EXT: no edema       [1]   Past Medical History:  Diagnosis Date    Anxiety     Arthritis     assoc w Crohn's    Avascular necrosis (HCC)     right ankle, right hip, left elbow    BRCA1 negative     BRCA2 negative     Breast cyst 2021 left    Cancer (HCC)     melanoma    Cholelithiasis     Crohn's disease (HCC)     Depression     Disease of thyroid gland     during pregnancy    Endometriosis     Female infertility     GERD (gastroesophageal reflux disease)     Gestational diabetes mellitus     Gestational    Inflammatory bowel disease     Malignant melanoma (HCC)     on back, had wide excision    Orbital myositis     Bilateral    PONV (postoperative nausea and vomiting)     Right ovarian cyst 2020    Skin cancer 2008    Thyroid disease     nodules   [2]   Past Surgical History:  Procedure Laterality Date    ABDOMINOPLASTY       SECTION      CHOLECYSTECTOMY      COLONOSCOPY      CYSTOSCOPY N/A 2020    Procedure: CYSTOSCOPY;  Surgeon: Katherine JONES  MD Marvin;  Location: [facility];  Service: Gynecology    DENTAL SURGERY      DIAGNOSTIC LAPAROSCOPY  06/2003    for endometriosis    DILATION AND CURETTAGE OF UTERUS      EGD      HERNIA REPAIR  2007    KNEE SURGERY Right     torn meniscus    OOPHORECTOMY Right     June 2020    VT LAPAROSCOPY SURG CHOLECYSTECTOMY N/A 06/30/2022    Procedure: LAP KENIA W/ ROBOTICS;  Surgeon: Erwin Mariscal MD;  Location: [facility];  Service: General    VT LAPAROSCOPY W/RMVL ADNEXAL STRUCTURES Bilateral 06/22/2020    Procedure: SALPINGECTOMY;  Surgeon: Katherine Wong MD;  Location: [facility];  Service: Gynecology    VT LAPAROSCOPY W/RMVL ADNEXAL STRUCTURES Right 06/22/2020    Procedure: OOPHORECTOMY;  Surgeon: Katherine Wong MD;  Location: [facility];  Service: Gynecology    VT LAPS ABD PRTM&OMENTUM DX W/WO SPEC BR/WA SPX N/A 06/22/2020    Procedure: DX LAP;  Surgeon: Katherine Wong MD;  Location: [facility];  Service: Gynecology    VT LAPS FULG/EXC OVARY VISCERA/PERITONEAL SURFACE N/A 06/22/2020    Procedure: FULG OF ENDOMETRIOSIS;  Surgeon: Katherine Wong MD;  Location: [facility];  Service: Gynecology    SALPINGECTOMY Bilateral     SKIN LESION EXCISION  2008    melanoma of back    TUBAL LIGATION     [3]   Social History  Tobacco Use    Smoking status: Never     Passive exposure: Never    Smokeless tobacco: Never   Vaping Use    Vaping status: Never Used   Substance and Sexual Activity    Alcohol use: Yes     Alcohol/week: 1.0 standard drink of alcohol     Types: 1 Glasses of wine per week     Comment: Social use    Drug use: No    Sexual activity: Yes     Partners: Male     Birth control/protection: Female Sterilization   [4]   Current Outpatient Medications:     Calcium Carbonate-Vitamin D (CALCIUM PLUS VITAMIN D PO)    clonazePAM (KlonoPIN) 1 mg tablet    estradiol (ESTRACE VAGINAL) 0.1 mg/g vaginal cream    levonorgestrel-ethinyl estradiol (SEASONALE) 0.15-0.03 MG per tablet    Multiple Vitamins-Minerals  (WOMENS MULTIVITAMIN) TABS    psyllium (METAMUCIL) 0.52 g capsule    tretinoin (RETIN-A) 0.025 % cream    Current Facility-Administered Medications:     lactated ringers infusion, 125 mL/hr, Intravenous, Continuous    lactated ringers infusion, 125 mL/hr, Intravenous, Continuous  [5]   Allergies  Allergen Reactions    Other Anaphylaxis     Annotation - [id number]: [place] bug spray    Scopolamine Visual Disturbance    Latex Rash    Tape [Medical Tape] Rash

## 2025-06-26 PROCEDURE — 88305 TISSUE EXAM BY PATHOLOGIST: CPT | Performed by: PATHOLOGY

## 2025-07-24 ENCOUNTER — TELEMEDICINE (OUTPATIENT)
Dept: OTHER | Facility: HOSPITAL | Age: 45
End: 2025-07-24
Payer: COMMERCIAL

## 2025-07-24 VITALS — TEMPERATURE: 101.5 F

## 2025-07-24 DIAGNOSIS — J02.9 SORE THROAT: Primary | ICD-10-CM

## 2025-07-24 PROCEDURE — 99213 OFFICE O/P EST LOW 20 MIN: CPT | Performed by: NURSE PRACTITIONER

## 2025-07-24 NOTE — PATIENT INSTRUCTIONS
As we discussed your illness is viral.  No antibiotics are indicated at this time.  You could repeat the covid test. Strep test ordered.   Rest and drink extra fluids.  OTC cough and cold medications as needed.  Tylenol or Motrin as needed for pain or fever.  Salt water gargles and throat lozenges for sore throat.  Follow up with PCP if no improvement.  Go to ER with worsening symptoms.      Cold Symptoms   WHAT YOU NEED TO KNOW:   A cold is an infection caused by a virus. The infection causes your upper respiratory system to become inflamed. Common symptoms of a cold include sneezing, dry throat, a stuffy nose, headache, watery eyes, and a cough. Your cough may be dry, or you may cough up mucus. You may also have muscle aches, joint pain, and tiredness. Rarely, you may have a fever. Most colds go away without treatment.  DISCHARGE INSTRUCTIONS:   Return to the emergency department if:   You have increased tiredness and weakness.    You are unable to eat.     Your heart is beating much faster than usual for you.     You see white spots in the back of your throat and your neck is swollen and sore to the touch.     You see pinpoint or larger reddish-purple dots on your skin.  Contact your healthcare provider if:   You have a fever higher than 102°F (38.9°C).    You have new or worsening shortness of breath.     You have thick nasal drainage for more than 2 days.     Your symptoms do not improve or get worse within 5 days.     You have questions or concerns about your condition or care.  Medicines:  The following medicines may be suggested by your healthcare provider to decrease your cold symptoms. These medicines are available without a doctor's order. Ask which medicines to take and when to take them. Follow directions.  NSAIDs or acetaminophen  help to bring down a fever or decrease pain.    Decongestants  help decrease nasal stuffiness.     Antihistamines  help decrease sneezing and a runny nose.     Cough  suppressants  help decrease how much you cough.    Expectorants  help loosen mucus so you can cough it up.    Take your medicine as directed.  Contact your healthcare provider if you think your medicine is not helping or if you have side effects. Tell him of her if you are allergic to any medicine. Keep a list of the medicines, vitamins, and herbs you take. Include the amounts, and when and why you take them. Bring the list or the pill bottles to follow-up visits. Carry your medicine list with you in case of an emergency.  Symptom relief:  The following may help relieve cold symptoms, such as a dry throat and congestion:  Gargle with mouthwash or warm salt water as directed.     Suck on throat lozenges or hard candy.     Use a cold or warm vaporizer or humidifier to ease your breathing.     Rest for at least 2 days and then as needed to decrease tiredness and weakness.     Use petroleum based jelly around your nostrils to decrease irritation from blowing your nose.  Drink liquids:  Liquids will help thin and loosen thick mucus so you can cough it up. Liquids will also keep you hydrated. Ask your healthcare provider which liquids are best for you and how much to drink each day.  Prevent the spread of germs:  You can spread your cold germs to others for at least 3 days after your symptoms start. Wash your hands often. Do not share items, such as eating utensils. Cover your nose and mouth when you cough or sneeze using the crook of your elbow instead of your hands. Throw used tissues in the garbage.  Do not smoke:  Smoking may worsen your symptoms and increase the length of time you feel sick. Talk with your healthcare provider if you need help to stop smoking.  Follow up with your healthcare provider as directed:  Write down your questions so you remember to ask them during your visits.   © 2017 [employer] Information is for End User's use only and may not be sold, redistributed or otherwise used for  commercial purposes. All illustrations and images included in CareNotes® are the copyrighted property of [employer] or [employer]  The above information is an  only. It is not intended as medical advice for individual conditions or treatments. Talk to your doctor, nurse or pharmacist before following any medical regimen to see if it is safe and effective for you.

## 2025-07-24 NOTE — PROGRESS NOTES
Virtual Regular Visit  Name: Ana Stroud      : 1980      MRN: [id number]  Encounter Provider: Your Video Visit Provider  Encounter Date: 2025   Encounter department: VIRTUAL CARE       Verification of patient location:  Patient is located at Home in the following state in which I hold an active license [place] :  Assessment & Plan  Sore throat    Orders:    Strep A PCR; Future        Encounter provider Your Video Visit Provider    The patient was identified by name and date of birth. Ana Stroud was informed that this is a telemedicine visit and that the visit is being conducted through the Epic Embedded platform. She agrees to proceed..  My office door was closed. No one else was in the room. She acknowledged consent and understanding of privacy and security of the video platform. The patient has agreed to participate and understands they can discontinue the visit at any time.    Patient is aware this is a billable service.     History obtained from: patient  History of Present Illness     This is a 45 year old female here today for video visit.  She states she has been sick for the last 2 days.  She states she was tired and scratchy throat.  She states she woke up the next day and had sore throat.  She states she then started with cough.  She states the cough is dry.  She states this morning she has been taking ibuprofen.  She also has congestion.  She has fatigue.  She tested for covid and was negative yesterday.  She was away at concert prior to onset of symptoms.       Review of Systems   Constitutional:  Positive for activity change, chills, fatigue and fever.   HENT:  Positive for congestion and sore throat.    Respiratory: Negative.     Cardiovascular: Negative.    Neurological: Negative.    Psychiatric/Behavioral: Negative.         Objective   Temp (!) 101.5 °F (38.6 °C)     Physical Exam  Constitutional:       General: She is not in acute distress.     Appearance: Normal  appearance. She is not ill-appearing or toxic-appearing.   HENT:      Head: Normocephalic and atraumatic.      Nose: Congestion present.   Pulmonary:      Effort: Pulmonary effort is normal. No respiratory distress.      Comments: No cough or audible wheezing   Abdominal:      Tenderness: There is no abdominal tenderness.     Neurological:      Mental Status: She is alert and oriented to person, place, and time.     Psychiatric:         Mood and Affect: Mood normal.         Behavior: Behavior normal.         Thought Content: Thought content normal.         Judgment: Judgment normal.         Visit Time  Total Visit Duration: 8 minutes not including the time spent for establishing the audio/video connection.

## 2025-07-24 NOTE — LETTER
July 24, 2025     Patient: Ana Stroud   YOB: 1980   Date of Visit: 7/24/2025       To Whom it May Concern:    Ana Stroud is under my professional care. She was seen at the above location on 7/24/2025. She may return to work on 07/28/2025.    If you have any questions or concerns, please don't hesitate to call.         Sincerely,          Your Video Visit Provider        CC: No Recipients

## 2025-08-18 ENCOUNTER — APPOINTMENT (OUTPATIENT)
Dept: LAB | Facility: MEDICAL CENTER | Age: 45
End: 2025-08-18
Payer: COMMERCIAL

## 2025-08-18 DIAGNOSIS — Z11.59 NEED FOR HEPATITIS C SCREENING TEST: ICD-10-CM

## 2025-08-18 DIAGNOSIS — E61.1 IRON DEFICIENCY: Primary | ICD-10-CM

## 2025-08-18 DIAGNOSIS — K50.80 CROHN'S DISEASE OF BOTH SMALL AND LARGE INTESTINE WITHOUT COMPLICATION (HCC): ICD-10-CM

## 2025-08-18 DIAGNOSIS — Z11.4 SCREENING FOR HIV (HUMAN IMMUNODEFICIENCY VIRUS): ICD-10-CM

## 2025-08-18 DIAGNOSIS — E27.9 ADRENAL NODULE (HCC): ICD-10-CM

## 2025-08-18 LAB
25(OH)D3 SERPL-MCNC: 51.3 NG/ML (ref 30–100)
FERRITIN SERPL-MCNC: 17 NG/ML (ref 30–307)
HCV AB SER QL: NORMAL
HIV 1+2 AB+HIV1 P24 AG SERPL QL IA: NORMAL
IRON SATN MFR SERPL: 12 % (ref 15–50)
IRON SERPL-MCNC: 49 UG/DL (ref 50–212)
TIBC SERPL-MCNC: 393.4 UG/DL (ref 250–450)
TRANSFERRIN SERPL-MCNC: 281 MG/DL (ref 203–362)
UIBC SERPL-MCNC: 344 UG/DL (ref 155–355)
VIT B12 SERPL-MCNC: 703 PG/ML (ref 180–914)

## 2025-08-18 PROCEDURE — 83540 ASSAY OF IRON: CPT

## 2025-08-18 PROCEDURE — 82533 TOTAL CORTISOL: CPT

## 2025-08-18 PROCEDURE — 82728 ASSAY OF FERRITIN: CPT

## 2025-08-18 PROCEDURE — 82306 VITAMIN D 25 HYDROXY: CPT

## 2025-08-18 PROCEDURE — 82607 VITAMIN B-12: CPT

## 2025-08-18 PROCEDURE — 83550 IRON BINDING TEST: CPT

## 2025-08-18 PROCEDURE — 86803 HEPATITIS C AB TEST: CPT

## 2025-08-18 PROCEDURE — 82088 ASSAY OF ALDOSTERONE: CPT

## 2025-08-18 PROCEDURE — 36415 COLL VENOUS BLD VENIPUNCTURE: CPT

## 2025-08-18 PROCEDURE — 84244 ASSAY OF RENIN: CPT

## 2025-08-18 PROCEDURE — 87389 HIV-1 AG W/HIV-1&-2 AB AG IA: CPT

## 2025-08-18 RX ORDER — FERROUS SULFATE 324(65)MG
324 TABLET, DELAYED RELEASE (ENTERIC COATED) ORAL
Qty: 30 TABLET | Refills: 3 | Status: SHIPPED | OUTPATIENT
Start: 2025-08-18

## 2025-08-22 LAB — ALDOST SERPL-MCNC: 14.5 NG/DL (ref 0–30)

## 2025-08-23 LAB — RENIN PLAS-CCNC: 1.71 NG/ML/HR (ref 0.17–5.38)

## 2025-08-27 LAB
CORTIS BS SAL-MCNC: 0.05 UG/DL
CORTIS SP1 P CHAL SAL-MCNC: 0.05 UG/DL

## (undated) DEVICE — IRRIG ENDO FLO TUBING

## (undated) DEVICE — DRAPE LAPAROTOMY W/POUCHES

## (undated) DEVICE — MAYO STAND COVER: Brand: CONVERTORS

## (undated) DEVICE — ADHESIVE SKIN HIGH VISCOSITY EXOFIN 1ML

## (undated) DEVICE — PVC URETHRAL CATHETER: Brand: DOVER

## (undated) DEVICE — ASTOUND STANDARD SURGICAL GOWN, XL: Brand: CONVERTORS

## (undated) DEVICE — BETHLEHEM UNIVERSAL GYN LAP PK: Brand: CARDINAL HEALTH

## (undated) DEVICE — PMI DISPOSABLE PUNCTURE CLOSURE DEVICE / SUTURE GRASPER: Brand: PMI

## (undated) DEVICE — COLUMN DRAPE

## (undated) DEVICE — SUT VICRYL 3-0 SH 27 IN J416H

## (undated) DEVICE — SCD SEQUENTIAL COMPRESSION COMFORT SLEEVE MEDIUM KNEE LENGTH: Brand: KENDALL SCD

## (undated) DEVICE — DRAPE EQUIPMENT RF WAND

## (undated) DEVICE — GLOVE PI ULTRA TOUCH SZ.7.0

## (undated) DEVICE — GLOVE INDICATOR PI UNDERGLOVE SZ 8 BLUE

## (undated) DEVICE — ENSEAL LAPAROSCOPIC TISSUE SEALER G2 STRAIGHT JAW FOR USE WITH G2 GENERATOR 5MM DIAMETER 35CM SHAFT LENGTH: Brand: ENSEAL

## (undated) DEVICE — GLOVE INDICATOR PI UNDERGLOVE SZ 7 BLUE

## (undated) DEVICE — NEEDLE HYPO 22G X 1-1/2 IN

## (undated) DEVICE — TROCAR: Brand: KII FIOS FIRST ENTRY

## (undated) DEVICE — 3M™ IOBAN™ 2 ANTIMICROBIAL INCISE DRAPE 6650EZ: Brand: IOBAN™ 2

## (undated) DEVICE — INTENDED FOR TISSUE SEPARATION, AND OTHER PROCEDURES THAT REQUIRE A SHARP SURGICAL BLADE TO PUNCTURE OR CUT.: Brand: BARD-PARKER SAFETY BLADES SIZE 11, STERILE

## (undated) DEVICE — ENDOPATH 5MM CURVED SCISSORS WITH MONOPOLAR CAUTERY: Brand: ENDOPATH

## (undated) DEVICE — STAPLER CANNULA SEAL: Brand: ENDOWRIST

## (undated) DEVICE — CANNULA SEAL

## (undated) DEVICE — DRAPE SHEET X-LG

## (undated) DEVICE — [HIGH FLOW INSUFFLATOR,  DO NOT USE IF PACKAGE IS DAMAGED,  KEEP DRY,  KEEP AWAY FROM SUNLIGHT,  PROTECT FROM HEAT AND RADIOACTIVE SOURCES.]: Brand: PNEUMOSURE

## (undated) DEVICE — PERMANENT CAUTERY HOOK: Brand: ENDOWRIST

## (undated) DEVICE — SUT MONOCRYL 4-0 PS-2 27 IN Y426H

## (undated) DEVICE — PREMIUM DRY TRAY LF: Brand: MEDLINE INDUSTRIES, INC.

## (undated) DEVICE — CADIERE FORCEPS: Brand: ENDOWRIST

## (undated) DEVICE — HEM-O-LOK CLIP CARTRIDGE MED/LARGE DA VINCI SI/XI

## (undated) DEVICE — GLOVE INDICATOR PI UNDERGLOVE SZ 6.5 BLUE

## (undated) DEVICE — TISSUE RETRIEVAL SYSTEM: Brand: INZII RETRIEVAL SYSTEM

## (undated) DEVICE — PENCIL ELECTROSURG E-Z CLEAN -0035H

## (undated) DEVICE — MEDIUM-LARGE CLIP APPLIER: Brand: ENDOWRIST

## (undated) DEVICE — ALLENTOWN LAP CHOLE APP PACK: Brand: CARDINAL HEALTH

## (undated) DEVICE — SUT VICRYL 0 UR-6 27 IN J603H

## (undated) DEVICE — ENDOPATH XCEL UNIVERSAL TROCAR STABLILITY SLEEVES: Brand: ENDOPATH XCEL

## (undated) DEVICE — BLUE HEAT SCOPE WARMER

## (undated) DEVICE — 3000CC GUARDIAN II: Brand: GUARDIAN

## (undated) DEVICE — BLADELESS OBTURATOR: Brand: WECK VISTA

## (undated) DEVICE — CHLORAPREP HI-LITE 26ML ORANGE

## (undated) DEVICE — ARM DRAPE